# Patient Record
Sex: MALE | Race: WHITE | Employment: FULL TIME | URBAN - METROPOLITAN AREA
[De-identification: names, ages, dates, MRNs, and addresses within clinical notes are randomized per-mention and may not be internally consistent; named-entity substitution may affect disease eponyms.]

---

## 2017-03-10 DIAGNOSIS — R73.9 HYPERGLYCEMIA: ICD-10-CM

## 2017-03-10 DIAGNOSIS — Z12.5 SCREENING FOR PROSTATE CANCER: ICD-10-CM

## 2017-03-10 DIAGNOSIS — E78.00 PURE HYPERCHOLESTEROLEMIA: ICD-10-CM

## 2017-03-10 DIAGNOSIS — R73.9 HYPERGLYCEMIA: Primary | ICD-10-CM

## 2017-03-10 DIAGNOSIS — I10 ESSENTIAL HYPERTENSION: ICD-10-CM

## 2017-03-10 LAB
ALT SERPL-CCNC: 21 U/L (ref 10–40)
ANION GAP SERPL CALCULATED.3IONS-SCNC: 13 MMOL/L (ref 3–16)
AST SERPL-CCNC: 17 U/L (ref 15–37)
BUN BLDV-MCNC: 12 MG/DL (ref 7–20)
CALCIUM SERPL-MCNC: 9.6 MG/DL (ref 8.3–10.6)
CHLORIDE BLD-SCNC: 105 MMOL/L (ref 99–110)
CHOLESTEROL, TOTAL: 149 MG/DL (ref 0–199)
CO2: 24 MMOL/L (ref 21–32)
CREAT SERPL-MCNC: 0.8 MG/DL (ref 0.8–1.3)
GFR AFRICAN AMERICAN: >60
GFR NON-AFRICAN AMERICAN: >60
GLUCOSE BLD-MCNC: 121 MG/DL (ref 70–99)
HDLC SERPL-MCNC: 60 MG/DL (ref 40–60)
LDL CHOLESTEROL CALCULATED: 74 MG/DL
POTASSIUM SERPL-SCNC: 4.3 MMOL/L (ref 3.5–5.1)
PROSTATE SPECIFIC ANTIGEN: 0.93 NG/ML (ref 0–4)
SODIUM BLD-SCNC: 142 MMOL/L (ref 136–145)
TRIGL SERPL-MCNC: 73 MG/DL (ref 0–150)
VLDLC SERPL CALC-MCNC: 15 MG/DL

## 2017-03-11 LAB
ESTIMATED AVERAGE GLUCOSE: 119.8 MG/DL
HBA1C MFR BLD: 5.8 %

## 2017-03-17 ENCOUNTER — OFFICE VISIT (OUTPATIENT)
Dept: FAMILY MEDICINE CLINIC | Age: 72
End: 2017-03-17

## 2017-03-17 VITALS
WEIGHT: 237 LBS | SYSTOLIC BLOOD PRESSURE: 122 MMHG | HEIGHT: 75 IN | BODY MASS INDEX: 29.47 KG/M2 | DIASTOLIC BLOOD PRESSURE: 70 MMHG | TEMPERATURE: 97.8 F

## 2017-03-17 DIAGNOSIS — I10 ESSENTIAL HYPERTENSION: ICD-10-CM

## 2017-03-17 DIAGNOSIS — R73.9 HYPERGLYCEMIA: Primary | ICD-10-CM

## 2017-03-17 DIAGNOSIS — E78.00 PURE HYPERCHOLESTEROLEMIA: ICD-10-CM

## 2017-03-17 DIAGNOSIS — Z11.59 NEED FOR HEPATITIS C SCREENING TEST: ICD-10-CM

## 2017-03-17 PROCEDURE — 99215 OFFICE O/P EST HI 40 MIN: CPT | Performed by: FAMILY MEDICINE

## 2017-03-17 RX ORDER — VALSARTAN 160 MG/1
TABLET ORAL
Qty: 90 TABLET | Refills: 3 | Status: SHIPPED | OUTPATIENT
Start: 2017-03-17 | End: 2018-04-19 | Stop reason: SDUPTHER

## 2017-03-17 RX ORDER — ATORVASTATIN CALCIUM 20 MG/1
TABLET, FILM COATED ORAL
Qty: 90 TABLET | Refills: 3 | Status: SHIPPED | OUTPATIENT
Start: 2017-03-17 | End: 2018-04-19 | Stop reason: SDUPTHER

## 2017-03-17 RX ORDER — EPINEPHRINE 0.3 MG/.3ML
INJECTION SUBCUTANEOUS
Qty: 2 EACH | Refills: 0 | Status: SHIPPED | OUTPATIENT
Start: 2017-03-17 | End: 2019-01-07 | Stop reason: ALTCHOICE

## 2017-03-17 RX ORDER — EPINEPHRINE 0.3 MG/.3ML
INJECTION SUBCUTANEOUS
Qty: 2 EACH | Refills: 0 | Status: SHIPPED | OUTPATIENT
Start: 2017-03-17 | End: 2018-04-19 | Stop reason: SDUPTHER

## 2017-03-17 ASSESSMENT — PATIENT HEALTH QUESTIONNAIRE - PHQ9
1. LITTLE INTEREST OR PLEASURE IN DOING THINGS: 0
SUM OF ALL RESPONSES TO PHQ QUESTIONS 1-9: 0
SUM OF ALL RESPONSES TO PHQ9 QUESTIONS 1 & 2: 0
2. FEELING DOWN, DEPRESSED OR HOPELESS: 0

## 2017-03-27 ASSESSMENT — ENCOUNTER SYMPTOMS
SHORTNESS OF BREATH: 0
BLOOD IN STOOL: 0
ABDOMINAL PAIN: 0
ROS SKIN COMMENTS: NO SKIN WOUNDS.

## 2018-03-09 ENCOUNTER — TELEPHONE (OUTPATIENT)
Dept: FAMILY MEDICINE CLINIC | Age: 73
End: 2018-03-09

## 2018-03-09 DIAGNOSIS — Z00.00 ROUTINE GENERAL MEDICAL EXAMINATION AT A HEALTH CARE FACILITY: Primary | ICD-10-CM

## 2018-03-09 NOTE — TELEPHONE ENCOUNTER
Pt has an appt on 4/19/18 for yearly , pt is needing blood work prior to appt. Pl advise.   [phone]

## 2018-04-13 DIAGNOSIS — Z00.00 ROUTINE GENERAL MEDICAL EXAMINATION AT A HEALTH CARE FACILITY: ICD-10-CM

## 2018-04-13 LAB
ANION GAP SERPL CALCULATED.3IONS-SCNC: 14 MMOL/L (ref 3–16)
BUN BLDV-MCNC: 16 MG/DL (ref 7–20)
CALCIUM SERPL-MCNC: 9.4 MG/DL (ref 8.3–10.6)
CHLORIDE BLD-SCNC: 103 MMOL/L (ref 99–110)
CHOLESTEROL, FASTING: 126 MG/DL (ref 0–199)
CO2: 24 MMOL/L (ref 21–32)
CREAT SERPL-MCNC: 0.7 MG/DL (ref 0.8–1.3)
GFR AFRICAN AMERICAN: >60
GFR NON-AFRICAN AMERICAN: >60
GLUCOSE BLD-MCNC: 109 MG/DL (ref 70–99)
HDLC SERPL-MCNC: 55 MG/DL (ref 40–60)
HEPATITIS C ANTIBODY INTERPRETATION: NORMAL
LDL CHOLESTEROL CALCULATED: 61 MG/DL
POTASSIUM SERPL-SCNC: 4.5 MMOL/L (ref 3.5–5.1)
PROSTATE SPECIFIC ANTIGEN: 0.85 NG/ML (ref 0–4)
SODIUM BLD-SCNC: 141 MMOL/L (ref 136–145)
TRIGLYCERIDE, FASTING: 48 MG/DL (ref 0–150)
VLDLC SERPL CALC-MCNC: 10 MG/DL

## 2018-04-14 LAB
ESTIMATED AVERAGE GLUCOSE: 119.8 MG/DL
HBA1C MFR BLD: 5.8 %

## 2018-04-19 ENCOUNTER — OFFICE VISIT (OUTPATIENT)
Dept: FAMILY MEDICINE CLINIC | Age: 73
End: 2018-04-19

## 2018-04-19 VITALS
HEIGHT: 75 IN | SYSTOLIC BLOOD PRESSURE: 128 MMHG | HEART RATE: 62 BPM | WEIGHT: 235 LBS | BODY MASS INDEX: 29.22 KG/M2 | DIASTOLIC BLOOD PRESSURE: 78 MMHG | TEMPERATURE: 98 F

## 2018-04-19 DIAGNOSIS — E78.00 PURE HYPERCHOLESTEROLEMIA: ICD-10-CM

## 2018-04-19 DIAGNOSIS — I10 HYPERTENSION, UNSPECIFIED TYPE: ICD-10-CM

## 2018-04-19 DIAGNOSIS — Z91.030 BEE STING ALLERGY: ICD-10-CM

## 2018-04-19 DIAGNOSIS — Z12.5 SCREENING FOR PROSTATE CANCER: ICD-10-CM

## 2018-04-19 DIAGNOSIS — Z00.00 ROUTINE GENERAL MEDICAL EXAMINATION AT A HEALTH CARE FACILITY: Primary | ICD-10-CM

## 2018-04-19 PROCEDURE — 99397 PER PM REEVAL EST PAT 65+ YR: CPT | Performed by: FAMILY MEDICINE

## 2018-04-19 RX ORDER — EPINEPHRINE 0.3 MG/.3ML
INJECTION SUBCUTANEOUS
Qty: 2 EACH | Refills: 0 | Status: SHIPPED | OUTPATIENT
Start: 2018-04-19 | End: 2019-01-07 | Stop reason: ALTCHOICE

## 2018-04-19 RX ORDER — VALSARTAN 160 MG/1
TABLET ORAL
Qty: 90 TABLET | Refills: 3 | Status: SHIPPED | OUTPATIENT
Start: 2018-04-19 | End: 2018-07-31 | Stop reason: ALTCHOICE

## 2018-04-19 RX ORDER — ATORVASTATIN CALCIUM 20 MG/1
TABLET, FILM COATED ORAL
Qty: 90 TABLET | Refills: 3 | Status: SHIPPED | OUTPATIENT
Start: 2018-04-19 | End: 2019-04-15 | Stop reason: SDUPTHER

## 2018-04-19 ASSESSMENT — PATIENT HEALTH QUESTIONNAIRE - PHQ9
SUM OF ALL RESPONSES TO PHQ9 QUESTIONS 1 & 2: 0
1. LITTLE INTEREST OR PLEASURE IN DOING THINGS: 0
2. FEELING DOWN, DEPRESSED OR HOPELESS: 0
SUM OF ALL RESPONSES TO PHQ QUESTIONS 1-9: 0

## 2018-04-19 ASSESSMENT — ENCOUNTER SYMPTOMS
BLOOD IN STOOL: 0
DIARRHEA: 0
VOMITING: 0
SHORTNESS OF BREATH: 0
NAUSEA: 0

## 2018-04-25 ENCOUNTER — TELEPHONE (OUTPATIENT)
Dept: FAMILY MEDICINE CLINIC | Age: 73
End: 2018-04-25

## 2018-05-11 ASSESSMENT — ENCOUNTER SYMPTOMS
ABDOMINAL PAIN: 0
CONSTIPATION: 0
TROUBLE SWALLOWING: 0
EYE PAIN: 0
WHEEZING: 0

## 2018-07-30 ENCOUNTER — TELEPHONE (OUTPATIENT)
Dept: FAMILY MEDICINE CLINIC | Age: 73
End: 2018-07-30

## 2018-07-31 RX ORDER — LOSARTAN POTASSIUM 100 MG/1
100 TABLET ORAL DAILY
Qty: 90 TABLET | Refills: 2 | Status: SHIPPED | OUTPATIENT
Start: 2018-07-31 | End: 2019-04-15 | Stop reason: SDUPTHER

## 2019-01-07 ENCOUNTER — OFFICE VISIT (OUTPATIENT)
Dept: FAMILY MEDICINE CLINIC | Age: 74
End: 2019-01-07
Payer: MEDICARE

## 2019-01-07 VITALS
HEIGHT: 75 IN | BODY MASS INDEX: 29.22 KG/M2 | SYSTOLIC BLOOD PRESSURE: 130 MMHG | WEIGHT: 235 LBS | DIASTOLIC BLOOD PRESSURE: 80 MMHG | TEMPERATURE: 97.7 F

## 2019-01-07 DIAGNOSIS — H61.92 SKIN LESION OF LEFT EAR: Primary | ICD-10-CM

## 2019-01-07 PROCEDURE — 99213 OFFICE O/P EST LOW 20 MIN: CPT | Performed by: FAMILY MEDICINE

## 2019-02-25 ASSESSMENT — ENCOUNTER SYMPTOMS: SHORTNESS OF BREATH: 0

## 2019-04-09 DIAGNOSIS — Z12.5 SCREENING FOR PROSTATE CANCER: ICD-10-CM

## 2019-04-09 DIAGNOSIS — Z00.00 ROUTINE GENERAL MEDICAL EXAMINATION AT A HEALTH CARE FACILITY: ICD-10-CM

## 2019-04-09 DIAGNOSIS — E78.00 PURE HYPERCHOLESTEROLEMIA: ICD-10-CM

## 2019-04-09 LAB
ANION GAP SERPL CALCULATED.3IONS-SCNC: 10 MMOL/L (ref 3–16)
BUN BLDV-MCNC: 13 MG/DL (ref 7–20)
CALCIUM SERPL-MCNC: 9.4 MG/DL (ref 8.3–10.6)
CHLORIDE BLD-SCNC: 103 MMOL/L (ref 99–110)
CHOLESTEROL, TOTAL: 126 MG/DL (ref 0–199)
CO2: 24 MMOL/L (ref 21–32)
CREAT SERPL-MCNC: 0.8 MG/DL (ref 0.8–1.3)
ESTIMATED AVERAGE GLUCOSE: 119.8 MG/DL
GFR AFRICAN AMERICAN: >60
GFR NON-AFRICAN AMERICAN: >60
GLUCOSE BLD-MCNC: 118 MG/DL (ref 70–99)
HBA1C MFR BLD: 5.8 %
HDLC SERPL-MCNC: 54 MG/DL (ref 40–60)
LDL CHOLESTEROL CALCULATED: 63 MG/DL
POTASSIUM SERPL-SCNC: 4.5 MMOL/L (ref 3.5–5.1)
PROSTATE SPECIFIC ANTIGEN: 0.97 NG/ML (ref 0–4)
SODIUM BLD-SCNC: 137 MMOL/L (ref 136–145)
TRIGL SERPL-MCNC: 44 MG/DL (ref 0–150)
VLDLC SERPL CALC-MCNC: 9 MG/DL

## 2019-04-15 ENCOUNTER — OFFICE VISIT (OUTPATIENT)
Dept: FAMILY MEDICINE CLINIC | Age: 74
End: 2019-04-15
Payer: MEDICARE

## 2019-04-15 VITALS
SYSTOLIC BLOOD PRESSURE: 130 MMHG | DIASTOLIC BLOOD PRESSURE: 80 MMHG | BODY MASS INDEX: 29.74 KG/M2 | HEIGHT: 75 IN | WEIGHT: 239.2 LBS | TEMPERATURE: 98.5 F

## 2019-04-15 DIAGNOSIS — M79.642 HAND PAIN, LEFT: ICD-10-CM

## 2019-04-15 DIAGNOSIS — I10 HYPERTENSION, UNSPECIFIED TYPE: ICD-10-CM

## 2019-04-15 DIAGNOSIS — Z12.5 SCREENING FOR PROSTATE CANCER: ICD-10-CM

## 2019-04-15 DIAGNOSIS — R73.03 PREDIABETES: ICD-10-CM

## 2019-04-15 DIAGNOSIS — E78.00 PURE HYPERCHOLESTEROLEMIA: Primary | ICD-10-CM

## 2019-04-15 PROCEDURE — 99215 OFFICE O/P EST HI 40 MIN: CPT | Performed by: FAMILY MEDICINE

## 2019-04-15 RX ORDER — ATORVASTATIN CALCIUM 20 MG/1
TABLET, FILM COATED ORAL
Qty: 90 TABLET | Refills: 3 | Status: SHIPPED | OUTPATIENT
Start: 2019-04-15 | End: 2020-04-16 | Stop reason: SDUPTHER

## 2019-04-15 RX ORDER — LOSARTAN POTASSIUM 100 MG/1
100 TABLET ORAL DAILY
Qty: 90 TABLET | Refills: 3 | Status: SHIPPED | OUTPATIENT
Start: 2019-04-15 | End: 2020-04-16 | Stop reason: SDUPTHER

## 2019-04-15 RX ORDER — EPINEPHRINE 0.3 MG/.3ML
INJECTION SUBCUTANEOUS
Qty: 1 EACH | Refills: 0 | Status: SHIPPED | OUTPATIENT
Start: 2019-04-15 | End: 2021-01-18 | Stop reason: SDUPTHER

## 2019-04-15 ASSESSMENT — ENCOUNTER SYMPTOMS
DIARRHEA: 0
NAUSEA: 0
CONSTIPATION: 0
SHORTNESS OF BREATH: 0
TROUBLE SWALLOWING: 0
ABDOMINAL PAIN: 0
BLOOD IN STOOL: 0
WHEEZING: 0
VOMITING: 0
EYE PAIN: 0

## 2019-04-15 ASSESSMENT — PATIENT HEALTH QUESTIONNAIRE - PHQ9
1. LITTLE INTEREST OR PLEASURE IN DOING THINGS: 0
SUM OF ALL RESPONSES TO PHQ QUESTIONS 1-9: 0
2. FEELING DOWN, DEPRESSED OR HOPELESS: 0
SUM OF ALL RESPONSES TO PHQ9 QUESTIONS 1 & 2: 0
SUM OF ALL RESPONSES TO PHQ QUESTIONS 1-9: 0

## 2019-04-15 NOTE — PATIENT INSTRUCTIONS
Patient Education        Recombinant Zoster (Shingles) Vaccine, RZV: What you need to know  Why get vaccinated? Shingles (also called herpes zoster, or just zoster) is a painful skin rash, often with blisters. Shingles is caused by the varicella zoster virus, the same virus that causes chickenpox. After you have chickenpox, the virus stays in your body and can cause shingles later in life. You can't catch shingles from another person. However, a person who has never had chickenpox (or chickenpox vaccine) could get chickenpox from someone with shingles. A shingles rash usually appears on one side of the face or body and heals within 2 to 4 weeks. Its main symptom is pain, which can be severe. Other symptoms can include fever, headache, chills, and upset stomach. Very rarely, a shingles infection can lead to pneumonia, hearing problems, blindness, brain inflammation (encephalitis), or death. For about 1 person in 5, severe pain can continue even long after the rash has cleared up. This long-lasting pain is called post-herpetic neuralgia (PHN). Shingles is far more common in people 48years of age and older than in younger people, and the risk increases with age. It is also more common in people whose immune system is weakened because of a disease such as cancer, or by drugs such as steroids or chemotherapy. At least 1 million people a year in the [facility] get shingles. Shingles vaccine (recombinant)  Recombinant shingles vaccine was approved by FDA in 2017 for the prevention of shingles. In clinical trials, it was more than 90% effective in preventing shingles. It can also reduce the likelihood of PHN. Two doses, 2 to 6 months apart, are recommended for adults 48 and older. This vaccine is also recommended for people who have already gotten the live shingles vaccine (Zostavax). There is no live virus in this vaccine.   Some people should not get this vaccine  Tell your vaccine provider if you:  · Have any severe, life-threatening allergies. A person who has ever had a life-threatening allergic reaction after a dose of recombinant shingles vaccine, or has a severe allergy to any component of this vaccine, may be advised not to be vaccinated. Ask your health care provider if you want information about vaccine components. · Are pregnant or breastfeeding. There is not much information about use of recombinant shingles vaccine in pregnant or nursing women. Your healthcare provider might recommend delaying vaccination. · Are not feeling well. If you have a mild illness, such as a cold, you can probably get the vaccine today. If you are moderately or severely ill, you should probably wait until you recover. Your doctor can advise you. Risks of a vaccine reaction  With any medicine, including vaccines, there is a chance of reactions. After recombinant shingles vaccination, a person might experience:  · Pain, redness, soreness, or swelling at the site of the injection  · Headache, muscle aches, fever, shivering, fatigue  In clinical trials, most people got a sore arm with mild or moderate pain after vaccination, and some also had redness and swelling where they got the shot. Some people felt tired, had muscle pain, a headache, shivering, fever, stomach pain, or nausea. About 1 out of 6 people who got recombinant zoster vaccine experienced side effects that prevented them from doing regular activities. Symptoms went away on their own in about 2 to 3 days. Side effects were more common in younger people. You should still get the second dose of recombinant zoster vaccine even if you had one of these reactions after the first dose. Other things that could happen after this vaccine:  · People sometimes faint after medical procedures, including vaccination. Sitting or lying down for about 15 minutes can help prevent fainting and injuries caused by a fall.  Tell your provider if you feel dizzy or have vision changes or ringing in the ears. · Some people get shoulder pain that can be more severe and longer-lasting than routine soreness that can follow injections. This happens very rarely. · Any medication can cause a severe allergic reaction. Such reactions to a vaccine are estimated at about 1 in a million doses, and would happen within a few minutes to a few hours after the vaccination. As with any medicine, there is a very remote chance of a vaccine causing a serious injury or death. The safety of vaccines is always being monitored. For more information, visit: www.cdc.gov/vaccinesafety/  What if there is a serious problem? What should I look for? · Look for anything that concerns you, such as signs of a severe allergic reaction, very high fever, or unusual behavior. Signs of a severe allergic reaction can include hives, swelling of the face and throat, difficulty breathing, a fast heartbeat, dizziness, and weakness. These would usually start a few minutes to a few hours after the vaccination. What should I do? · If you think it is a severe allergic reaction or other emergency that can't wait, call 9-1-1 or get to the nearest hospital. Otherwise, call your health care provider. · Afterward, the reaction should be reported to the Vaccine Adverse Event Reporting System (VAERS). Your doctor should file this report, or you can do it yourself through the VAERS website at www.vaers. hhs.gov, or by calling 2-393.429.8097. VAERS does not give medical advice. .  How can I learn more? · Ask your health care provider. He or she can give you the vaccine package insert or suggest other sources of information. · Call your local or state health department. · Contact the Centers for Disease Control and Prevention (CDC):  ? Call 1-116.779.2380 (1-800-CDC-INFO) or  ?  Visit CDC's website at www.cdc.gov/vaccines  Vaccine Information Statement (Interim)  Recombinant Zoster Vaccine  2/12/2018  Department of Health and Human Services  Centers for Disease Control and Prevention  Many Vaccine Information Statements are available in [place] and other languages. See www.immunize.org/vis. Hojas de Información Sobre Vacunas están disponibles en Español y en muchos otros idiomas. Visite Favian.si   Care instructions adapted under license by [facility] ([facility]). If you have questions about a medical condition or this instruction, always ask your healthcare professional. [place] [address] any warranty or liability for your use of this information.

## 2019-10-05 ENCOUNTER — APPOINTMENT (OUTPATIENT)
Dept: GENERAL RADIOLOGY | Age: 74
End: 2019-10-05
Payer: MEDICARE

## 2019-10-05 ENCOUNTER — HOSPITAL ENCOUNTER (EMERGENCY)
Age: 74
Discharge: HOME | End: 2019-10-05
Attending: EMERGENCY MEDICINE
Payer: MEDICARE

## 2019-10-05 VITALS
RESPIRATION RATE: 16 BRPM | DIASTOLIC BLOOD PRESSURE: 91 MMHG | SYSTOLIC BLOOD PRESSURE: 158 MMHG | TEMPERATURE: 97 F | HEIGHT: 75 IN | OXYGEN SATURATION: 98 % | WEIGHT: 247.14 LBS | BODY MASS INDEX: 30.73 KG/M2 | HEART RATE: 62 BPM

## 2019-10-05 DIAGNOSIS — R21 PAPULAR RASH, LOCALIZED: Primary | ICD-10-CM

## 2019-10-05 DIAGNOSIS — S60.00XA TRAUMATIC ECCHYMOSIS OF FINGER, INITIAL ENCOUNTER: ICD-10-CM

## 2019-10-05 DIAGNOSIS — R20.0 NUMBNESS OF FINGER: ICD-10-CM

## 2019-10-05 PROCEDURE — 73140 X-RAY EXAM OF FINGER(S): CPT

## 2019-10-05 PROCEDURE — 99283 EMERGENCY DEPT VISIT LOW MDM: CPT

## 2020-01-20 ENCOUNTER — TELEPHONE (OUTPATIENT)
Dept: FAMILY MEDICINE CLINIC | Age: 75
End: 2020-01-20

## 2020-01-20 NOTE — TELEPHONE ENCOUNTER
Pt is former Dr Candi Mari pt scheduled with you for physical 4/16/20, he is asking if the lab orders can be updated with your name on them.       Please advise, [phone]

## 2020-01-22 NOTE — TELEPHONE ENCOUNTER
I can but there is no need for a psa unless he has had prostate ca in past. Simply due to his age  He is also very overdue for his office visit  I see my stable htn and lipid patients twice a year

## 2020-04-16 ENCOUNTER — VIRTUAL VISIT (OUTPATIENT)
Dept: FAMILY MEDICINE CLINIC | Age: 75
End: 2020-04-16
Payer: MEDICARE

## 2020-04-16 PROCEDURE — 99214 OFFICE O/P EST MOD 30 MIN: CPT | Performed by: FAMILY MEDICINE

## 2020-04-16 RX ORDER — ATORVASTATIN CALCIUM 20 MG/1
TABLET, FILM COATED ORAL
Qty: 90 TABLET | Refills: 2 | Status: SHIPPED | OUTPATIENT
Start: 2020-04-16 | End: 2020-09-28 | Stop reason: SDUPTHER

## 2020-04-16 RX ORDER — LOSARTAN POTASSIUM 100 MG/1
100 TABLET ORAL DAILY
Qty: 90 TABLET | Refills: 2 | Status: SHIPPED | OUTPATIENT
Start: 2020-04-16 | End: 2020-09-28 | Stop reason: SDUPTHER

## 2020-04-16 ASSESSMENT — PATIENT HEALTH QUESTIONNAIRE - PHQ9
SUM OF ALL RESPONSES TO PHQ QUESTIONS 1-9: 0
1. LITTLE INTEREST OR PLEASURE IN DOING THINGS: 0
SUM OF ALL RESPONSES TO PHQ9 QUESTIONS 1 & 2: 0
2. FEELING DOWN, DEPRESSED OR HOPELESS: 0
SUM OF ALL RESPONSES TO PHQ QUESTIONS 1-9: 0

## 2020-04-16 ASSESSMENT — ENCOUNTER SYMPTOMS
CHEST TIGHTNESS: 0
NAUSEA: 0
EYE PAIN: 0
COUGH: 0
ABDOMINAL DISTENTION: 0
DIARRHEA: 0
ABDOMINAL PAIN: 0
CONSTIPATION: 0
VOMITING: 0
TROUBLE SWALLOWING: 0
SORE THROAT: 0
BLOOD IN STOOL: 0
SHORTNESS OF BREATH: 0

## 2020-04-16 NOTE — PROGRESS NOTES
Subjective:      Patient ID: Michael Dean is a 69 y.o. male. Patient presents with: Annual Exam    Here for the above. New patient to me former patient dr luna. Treated for htn and lipid  Former smoker none for 30 years  Colon 12/30/12. Recheck in 5 years due to hx of polyps  Has seen derm sporadically    Needs aaa screen  He still works doing  Carpentry. Colon done on 4/23/18 small polyp check in 5 years. Dr Sravan Cid still wants drawn and I told him really not needed but he desired to do  [place] Sees for thought good idea he has mi in brother and father so former doctor told him to take  epipen used for wasp stings when he gets multiple he will have reactions   bp ~124-132/65-75 at home  Weight 234# no loss of weight     He still see the derm group for skin ca. See reguarly    Needs refill [facility] 90 day  He still wants to do the prostate screen  Ros negative    Family no change   meds reviewed      YOB: 1945    Date of Visit:  4/16/2020     -- Bee Venom -- Swelling    --  Has epi-pen   -- Penicillins    -- Vicodin (Hydrocodone-Acetaminophen)     Current Outpatient Medications:  losartan (COZAAR) 100 MG tablet, Take 1 tablet by mouth daily, Disp: 90 tablet, Rfl: 3  atorvastatin (LIPITOR) 20 MG tablet, TAKE 1 TABLET BY MOUTH AT BEDTIME, Disp: 90 tablet, Rfl: 3  EPINEPHrine (EPIPEN) 0.3 MG/0.3ML SOAJ injection, Use as directed for allergic reaction, Disp: 1 each, Rfl: 0  UNABLE TO FIND, States takes an otc bowel stimulant , Disp: , Rfl:   aspirin (ADULT ASPIRIN EC LOW STRENGTH) 81 MG EC tablet, Take 81 mg by mouth daily. , Disp: , Rfl:   Multiple Vitamin (MULTIVITAMIN) capsule, Take 1 capsule by mouth daily. , Disp: , Rfl:     No current facility-administered medications for this visit. There were no vitals filed for this visit. There is no height or weight on file to calculate BMI.      Wt Readings from Last 3 Encounters:  10/05/19 : 247 lb 2.2 oz (112.1 kg)  04/15/19 : 239 lb 3.2

## 2020-05-01 ENCOUNTER — HOSPITAL ENCOUNTER (EMERGENCY)
Age: 75
Discharge: HOME | End: 2020-05-01
Attending: EMERGENCY MEDICINE
Payer: MEDICARE

## 2020-05-01 ENCOUNTER — APPOINTMENT (OUTPATIENT)
Dept: CT IMAGING | Age: 75
End: 2020-05-01
Payer: MEDICARE

## 2020-05-01 ENCOUNTER — TELEPHONE (OUTPATIENT)
Dept: FAMILY MEDICINE CLINIC | Age: 75
End: 2020-05-01

## 2020-05-01 VITALS
HEIGHT: 75 IN | SYSTOLIC BLOOD PRESSURE: 158 MMHG | WEIGHT: 244.93 LBS | TEMPERATURE: 98.5 F | DIASTOLIC BLOOD PRESSURE: 91 MMHG | OXYGEN SATURATION: 96 % | RESPIRATION RATE: 16 BRPM | HEART RATE: 61 BPM | BODY MASS INDEX: 30.45 KG/M2

## 2020-05-01 LAB
ANION GAP SERPL CALCULATED.3IONS-SCNC: 13 MMOL/L (ref 3–16)
BASOPHILS ABSOLUTE: 0.1 K/UL (ref 0–0.2)
BASOPHILS RELATIVE PERCENT: 1.2 %
BUN BLDV-MCNC: 12 MG/DL (ref 7–20)
CALCIUM SERPL-MCNC: 9.8 MG/DL (ref 8.3–10.6)
CHLORIDE BLD-SCNC: 106 MMOL/L (ref 99–110)
CO2: 23 MMOL/L (ref 21–32)
CREAT SERPL-MCNC: 0.7 MG/DL (ref 0.8–1.3)
EKG ATRIAL RATE: 68 BPM
EKG DIAGNOSIS: NORMAL
EKG P AXIS: 11 DEGREES
EKG P-R INTERVAL: 142 MS
EKG Q-T INTERVAL: 384 MS
EKG QRS DURATION: 94 MS
EKG QTC CALCULATION (BAZETT): 408 MS
EKG R AXIS: -22 DEGREES
EKG T AXIS: 19 DEGREES
EKG VENTRICULAR RATE: 68 BPM
EOSINOPHILS ABSOLUTE: 0.1 K/UL (ref 0–0.6)
EOSINOPHILS RELATIVE PERCENT: 2.5 %
GFR AFRICAN AMERICAN: >60
GFR NON-AFRICAN AMERICAN: >60
GLUCOSE BLD-MCNC: 103 MG/DL (ref 70–99)
HCT VFR BLD CALC: 50.3 % (ref 40.5–52.5)
HEMOGLOBIN: 16.1 G/DL (ref 13.5–17.5)
LYMPHOCYTES ABSOLUTE: 1.2 K/UL (ref 1–5.1)
LYMPHOCYTES RELATIVE PERCENT: 24.1 %
MCH RBC QN AUTO: 31.6 PG (ref 26–34)
MCHC RBC AUTO-ENTMCNC: 32.1 G/DL (ref 31–36)
MCV RBC AUTO: 98.5 FL (ref 80–100)
MONOCYTES ABSOLUTE: 0.6 K/UL (ref 0–1.3)
MONOCYTES RELATIVE PERCENT: 11.2 %
NEUTROPHILS ABSOLUTE: 3 K/UL (ref 1.7–7.7)
NEUTROPHILS RELATIVE PERCENT: 61 %
PDW BLD-RTO: 13.5 % (ref 12.4–15.4)
PLATELET # BLD: 156 K/UL (ref 135–450)
PMV BLD AUTO: 7.5 FL (ref 5–10.5)
POTASSIUM REFLEX MAGNESIUM: 4.1 MMOL/L (ref 3.5–5.1)
RBC # BLD: 5.1 M/UL (ref 4.2–5.9)
SODIUM BLD-SCNC: 142 MMOL/L (ref 136–145)
TROPONIN: <0.01 NG/ML
WBC # BLD: 5 K/UL (ref 4–11)

## 2020-05-01 PROCEDURE — 93010 ELECTROCARDIOGRAM REPORT: CPT | Performed by: INTERNAL MEDICINE

## 2020-05-01 PROCEDURE — 80048 BASIC METABOLIC PNL TOTAL CA: CPT

## 2020-05-01 PROCEDURE — 36415 COLL VENOUS BLD VENIPUNCTURE: CPT

## 2020-05-01 PROCEDURE — 93005 ELECTROCARDIOGRAM TRACING: CPT | Performed by: EMERGENCY MEDICINE

## 2020-05-01 PROCEDURE — 99284 EMERGENCY DEPT VISIT MOD MDM: CPT

## 2020-05-01 PROCEDURE — 84484 ASSAY OF TROPONIN QUANT: CPT

## 2020-05-01 PROCEDURE — 70450 CT HEAD/BRAIN W/O DYE: CPT

## 2020-05-01 PROCEDURE — 85025 COMPLETE CBC W/AUTO DIFF WBC: CPT

## 2020-05-01 NOTE — ED TRIAGE NOTES
Pt. C/o elevated BP at home 199/109 this am, denies chest pain or shortness of breath. Slight headache.

## 2020-05-01 NOTE — TELEPHONE ENCOUNTER
Pt stated that is bp is high     bp 135/70 normal  199/101 about 20 mins ago     Feel weird, light sweats, slight headache     Pl advise.    [phone]

## 2020-05-01 NOTE — ED PROVIDER NOTES
[address] ENCOUNTER      Pt Name: Johnny Escobedo  MRN: [id number]  [place] 1945  Date of evaluation: 5/1/2020  Provider: Manuel Hsieh, [facility]       Chief Complaint   Patient presents with    Hypertension     pt. took BP at home 199/109, called PMD, told to come to ED, no chest pain, no SOB, does have a slight headache         HISTORY OF PRESENT ILLNESS   (Location/Symptom, Timing/Onset, Context/Setting, Quality, Duration, Modifying Factors, Severity)  Note limiting factors. Johnny Escobedo is a 69 y.o. male who presents to the emergency department with a complaint of elevated blood pressure. Patient reports that he has a history of hypertension. He takes losartan for blood pressure control. He took his medication today at 6 AM as scheduled. He states that he has been advised to check his blood pressure periodically 2-3 times a week by his primary care physician. This morning he did a routine check on his blood pressure and it was mildly elevated at 150/93. However, he states that he had just drank a very large thermos of coffee prior to taking his blood pressure this morning. He became concerned because his blood pressure was elevated and a few minutes later checked it and it was 170/90. He checked it again a few minutes later and it was 199/109. He states \"I freaked out\". He called his primary care physician and was advised by the nurse to come to the emergency department for evaluation. He admits to a slight headache but feels that it is because he became anxious about what was going on. He rates the headache a 2/10 intensity and describes the headache as a general mild throbbing pain. He denies any neck pain stiffness or photophobia. He denies any fever or chills. He denies any cough or cold symptoms. He denies any chest pain heaviness pressure or tightness.   He denies any shortness of breath or dyspnea on exertion. No diaphoresis. He denies any focal weakness or numbness. He denies any vision change or speech change. No facial droop. He denies any difficulty walking or speaking. He denies any back pain or flank pain. No recent illness. No recent trauma or injury. He denies any drug or alcohol use. He denies ingestion of any medications chemicals or substances. He specifically denies ingestion of any other stimulants other than caffeine. HPI    Nursing Notes were reviewed. REVIEW OF SYSTEMS    (2-9 systems for level 4, 10 or more for level 5)       Constitutional: Negative for fever or chills. HENT: Negative for rhinorrhea and sore throat. Eyes: Negative for redness or drainage. Respiratory: Negative for shortness of breath or dyspnea on exertion. Cardiovascular: Negative for chest pain. Gastrointestinal: Negative for abdominal pain. Negative for vomiting or diarrhea. Genitourinary: Negative for flank pain. Negative for dysuria. Negative for hematuria. Neurological: Negative for headache. All systems are reviewed and are negative except for those listed above in the history of present illness and ROS.         PAST MEDICAL HISTORY     Past Medical History:   Diagnosis Date    Abnormal finding 7/1/08    mitch    Abnormal finding     Hemoccult-- 7/20/2009 guiac neg// PSA-- 1/7/2011 .80     Cancer ([address]     basal cell left ear, right cheek    Carpal tunnel syndrome on right     Dermatophytosis of nail     Disturbance of skin sensation     Hypercholesteremia     Hypertension     Impaired fasting glucose     Kidney stone     Routine general medical examination at a health care facility     Special screening for malignant neoplasm of prostate     Weight gain          SURGICAL HISTORY       Past Surgical History:   Procedure Laterality Date    ANKLE SURGERY  2002    right ankle plate     CARPAL TUNNEL RELEASE  1999    right hand    COLONOSCOPY  11/30/07    COLONOSCOPY

## 2020-05-04 ENCOUNTER — OFFICE VISIT (OUTPATIENT)
Dept: FAMILY MEDICINE CLINIC | Age: 75
End: 2020-05-04
Payer: MEDICARE

## 2020-05-04 VITALS
DIASTOLIC BLOOD PRESSURE: 98 MMHG | BODY MASS INDEX: 29.72 KG/M2 | TEMPERATURE: 98.3 F | SYSTOLIC BLOOD PRESSURE: 174 MMHG | WEIGHT: 239 LBS | HEIGHT: 75 IN | HEART RATE: 76 BPM

## 2020-05-04 PROCEDURE — 99213 OFFICE O/P EST LOW 20 MIN: CPT | Performed by: FAMILY MEDICINE

## 2020-05-04 RX ORDER — AMLODIPINE BESYLATE 2.5 MG/1
2.5 TABLET ORAL DAILY
Qty: 30 TABLET | Refills: 3 | Status: SHIPPED | OUTPATIENT
Start: 2020-05-04 | End: 2020-08-20

## 2020-05-04 ASSESSMENT — ENCOUNTER SYMPTOMS
VOMITING: 0
NAUSEA: 0
ABDOMINAL PAIN: 0
SHORTNESS OF BREATH: 0
DIARRHEA: 0
COUGH: 0

## 2020-05-04 NOTE — PROGRESS NOTES
Trying to lose weight   05/04/20 : 239 lb (108.4 kg)  05/01/20 : 244 lb 14.9 oz (111.1 kg)  10/05/19 : 247 lb 2.2 oz (112.1 kg)    BP Readings from Last 3 Encounters:  05/04/20 : (!) 174/98  05/01/20 : (!) 158/91  10/05/19 : (!) 158/91          Review of Systems   Constitutional: Negative for appetite change, chills, fever and unexpected weight change. Respiratory: Negative for cough and shortness of breath. Cardiovascular: Negative for chest pain, palpitations and leg swelling. Gastrointestinal: Negative for abdominal pain, diarrhea, nausea and vomiting. Genitourinary: Negative for difficulty urinating. Neurological: Negative for headaches. Objective:   Physical Exam  Constitutional:       General: He is not in acute distress. Appearance: Normal appearance. He is well-developed. He is not ill-appearing or diaphoretic. HENT:      Head: Normocephalic and atraumatic. Eyes:      General: No scleral icterus. Neck:      Musculoskeletal: Neck supple. Thyroid: No thyroid mass or thyromegaly. Cardiovascular:      Rate and Rhythm: Normal rate and regular rhythm. Heart sounds: Normal heart sounds. No murmur. No friction rub. No gallop. Comments:     Pulmonary:      Effort: Pulmonary effort is normal. No tachypnea, accessory muscle usage or respiratory distress. Breath sounds: Normal breath sounds. No decreased breath sounds, wheezing, rhonchi or rales. Abdominal:      General: Bowel sounds are normal. There is no distension or abdominal bruit. Palpations: Abdomen is soft. There is no hepatomegaly, splenomegaly, mass or pulsatile mass. Tenderness: There is no abdominal tenderness. There is no guarding. Lymphadenopathy:      Cervical: No cervical adenopathy. Upper Body:      Right upper body: No supraclavicular adenopathy. Left upper body: No supraclavicular adenopathy. Skin:     General: Skin is warm and dry. Coloration: Skin is not pale.

## 2020-05-22 ENCOUNTER — TELEPHONE (OUTPATIENT)
Dept: FAMILY MEDICINE CLINIC | Age: 75
End: 2020-05-22

## 2020-06-30 ENCOUNTER — HOSPITAL ENCOUNTER (OUTPATIENT)
Dept: ULTRASOUND IMAGING | Age: 75
Discharge: HOME | End: 2020-06-30
Payer: MEDICARE

## 2020-06-30 PROCEDURE — 76706 US ABDL AORTA SCREEN AAA: CPT

## 2020-07-13 ENCOUNTER — TELEPHONE (OUTPATIENT)
Dept: FAMILY MEDICINE CLINIC | Age: 75
End: 2020-07-13

## 2020-07-13 NOTE — TELEPHONE ENCOUNTER
Pt stated that atorvastatin (LIPITOR) 20 MG tablet   losartan (COZAAR) 100 MG tablet   Was a 90 day supply w/ 2 refills   And   amLODIPine (NORVASC) 2.5 MG tablet   Was a 30 day supply w/ 3 refills. Pt usually gets a 90 day supply w/ 3 refills. Pt is needing to know if he is needing to be seen again before he gets refills? Pl advise .   [phone]

## 2020-07-13 NOTE — TELEPHONE ENCOUNTER
Saida Alvarado advised and verbalized understanding. He states he will stop by office this Thursday or Friday to have bp checked.

## 2020-07-13 NOTE — TELEPHONE ENCOUNTER
We added a new bp med to see how it would do  He needs to have a bp check some time last month but the office was closed  Come in to see medical assistant for a bp check and if ok we can send off the 90 day and see us in 5 months

## 2020-07-21 ENCOUNTER — NURSE ONLY (OUTPATIENT)
Dept: FAMILY MEDICINE CLINIC | Age: 75
End: 2020-07-21

## 2020-07-21 VITALS — SYSTOLIC BLOOD PRESSURE: 138 MMHG | DIASTOLIC BLOOD PRESSURE: 84 MMHG

## 2020-08-20 RX ORDER — AMLODIPINE BESYLATE 2.5 MG/1
2.5 TABLET ORAL DAILY
Qty: 30 TABLET | Refills: 3 | Status: SHIPPED | OUTPATIENT
Start: 2020-08-20 | End: 2020-09-28 | Stop reason: SDUPTHER

## 2020-09-28 ENCOUNTER — OFFICE VISIT (OUTPATIENT)
Dept: FAMILY MEDICINE CLINIC | Age: 75
End: 2020-09-28
Payer: MEDICARE

## 2020-09-28 VITALS
DIASTOLIC BLOOD PRESSURE: 82 MMHG | HEART RATE: 76 BPM | HEIGHT: 75 IN | TEMPERATURE: 97.3 F | BODY MASS INDEX: 29.59 KG/M2 | WEIGHT: 238 LBS | SYSTOLIC BLOOD PRESSURE: 124 MMHG

## 2020-09-28 PROCEDURE — 99213 OFFICE O/P EST LOW 20 MIN: CPT | Performed by: FAMILY MEDICINE

## 2020-09-28 RX ORDER — ATORVASTATIN CALCIUM 20 MG/1
TABLET, FILM COATED ORAL
Qty: 90 TABLET | Refills: 2 | Status: SHIPPED | OUTPATIENT
Start: 2020-09-28 | End: 2021-04-23

## 2020-09-28 RX ORDER — AMLODIPINE BESYLATE 2.5 MG/1
2.5 TABLET ORAL DAILY
Qty: 90 TABLET | Refills: 2 | Status: SHIPPED | OUTPATIENT
Start: 2020-09-28 | End: 2021-03-15

## 2020-09-28 RX ORDER — LOSARTAN POTASSIUM 100 MG/1
100 TABLET ORAL DAILY
Qty: 90 TABLET | Refills: 2 | Status: SHIPPED | OUTPATIENT
Start: 2020-09-28 | End: 2021-04-16

## 2020-09-28 ASSESSMENT — ENCOUNTER SYMPTOMS
NAUSEA: 0
VOMITING: 0
COUGH: 0
DIARRHEA: 0
CHEST TIGHTNESS: 0
ABDOMINAL PAIN: 0
ABDOMINAL DISTENTION: 0
BLOOD IN STOOL: 0
SHORTNESS OF BREATH: 0
CONSTIPATION: 0

## 2020-09-28 NOTE — PROGRESS NOTES
Wt Readings from Last 3 Encounters:  09/28/20 : 238 lb (108 kg)  05/04/20 : 239 lb (108.4 kg)  05/01/20 : 244 lb 14.9 oz (111.1 kg)    BP Readings from Last 3 Encounters:  09/28/20 : 124/82  07/21/20 : 138/84  05/04/20 : (!) 174/98                  Review of Systems   Constitutional: Negative for appetite change, chills, fever and unexpected weight change. Respiratory: Negative for cough, chest tightness and shortness of breath. Cardiovascular: Negative for chest pain, palpitations and leg swelling. Gastrointestinal: Negative for abdominal distention, abdominal pain, blood in stool, constipation, diarrhea, nausea and vomiting. No dysphagia and seldom gets heartburn   Genitourinary: Negative for difficulty urinating, dysuria and hematuria. Neurological: Negative for dizziness, light-headedness and headaches. Objective:   Physical Exam  Constitutional:       General: He is not in acute distress. Appearance: Normal appearance. He is well-developed. He is not ill-appearing or diaphoretic. Eyes:      General: No scleral icterus. Neck:      Musculoskeletal: Neck supple. Thyroid: No thyroid mass or thyromegaly. Cardiovascular:      Rate and Rhythm: Normal rate and regular rhythm. Heart sounds: Normal heart sounds. No murmur. No friction rub. No gallop. Comments:     Pulmonary:      Effort: Pulmonary effort is normal. No tachypnea, accessory muscle usage or respiratory distress. Breath sounds: Normal breath sounds. No decreased breath sounds, wheezing, rhonchi or rales. Abdominal:      General: Bowel sounds are normal. There is no distension or abdominal bruit. Palpations: Abdomen is soft. There is no hepatomegaly, splenomegaly, mass or pulsatile mass. Tenderness: There is no abdominal tenderness. There is no guarding. Lymphadenopathy:      Cervical: No cervical adenopathy. Upper Body:      Right upper body: No supraclavicular adenopathy.       Left upper body: No supraclavicular adenopathy. Skin:     General: Skin is warm and dry. Coloration: Skin is not pale. Nails: There is no clubbing. Neurological:      General: No focal deficit present. Mental Status: He is alert. Psychiatric:         Attention and Perception: Attention normal.         Mood and Affect: Mood normal.         Assessment:        Diagnosis Orders   1. Essential hypertension  Lipid Panel    Comprehensive Metabolic Panel   2. Pure hypercholesterolemia  atorvastatin (LIPITOR) 20 MG tablet    Lipid Panel    Comprehensive Metabolic Panel       He plans on getting flu shot at Rx  He had aaa screen in 6/30/20    Orders Placed This Encounter   Medications    atorvastatin (LIPITOR) 20 MG tablet     Sig: TAKE 1 TABLET BY MOUTH AT BEDTIME     Dispense:  90 tablet     Refill:  2    losartan (COZAAR) 100 MG tablet     Sig: Take 1 tablet by mouth daily     Dispense:  90 tablet     Refill:  2    amLODIPine (NORVASC) 2.5 MG tablet     Sig: Take 1 tablet by mouth daily     Dispense:  90 tablet     Refill:  2    hydrocortisone 2.5 % cream     Sig: Apply topically 2 times daily.  prn     Dispense:  45 g     Refill:  0           Plan:      Continue the diet and the current medicine  See in about 6 months     Immunization History   Administered Date(s) Administered    Influenza 10/31/2011    Influenza Virus Vaccine 08/31/2014, 11/25/2017, 10/20/2018    Influenza Whole 12/30/2013, 11/01/2015    Influenza, High Dose (Fluzone 65 yrs and older) 12/11/2012, 10/18/2016    Pneumococcal Conjugate 13-valent ([id number]) 03/08/2016    Pneumococcal Polysaccharide ([id number]) 05/30/2013    Td, unspecified formulation 03/03/2015    Tdap (Boostrix, Adacel) 07/01/2008    Zoster Live (Zostavax) 11/20/2012    Zoster Recombinant (Shingrix) 10/10/2019, 12/16/2019               Ramona Jarvis MD

## 2020-09-28 NOTE — PATIENT INSTRUCTIONS
Continue the diet and the current medicine  See in about 6 months     Immunization History   Administered Date(s) Administered    Influenza 10/31/2011    Influenza Virus Vaccine 08/31/2014, 11/25/2017, 10/20/2018    Influenza Whole 12/30/2013, 11/01/2015    Influenza, High Dose (Fluzone 65 yrs and older) 12/11/2012, 10/18/2016    Pneumococcal Conjugate 13-valent ([id number]) 03/08/2016    Pneumococcal Polysaccharide ([id number]) 05/30/2013    Td, unspecified formulation 03/03/2015    Tdap (Boostrix, Adacel) 07/01/2008    Zoster Live (Zostavax) 11/20/2012    Zoster Recombinant (Shingrix) 10/10/2019, 12/16/2019

## 2020-10-21 DIAGNOSIS — E78.00 PURE HYPERCHOLESTEROLEMIA: ICD-10-CM

## 2020-10-21 DIAGNOSIS — I10 ESSENTIAL HYPERTENSION: ICD-10-CM

## 2020-10-21 LAB
A/G RATIO: 1.3 (ref 1.1–2.2)
ALBUMIN SERPL-MCNC: 4 G/DL (ref 3.4–5)
ALP BLD-CCNC: 65 U/L (ref 40–129)
ALT SERPL-CCNC: 20 U/L (ref 10–40)
ANION GAP SERPL CALCULATED.3IONS-SCNC: 11 MMOL/L (ref 3–16)
AST SERPL-CCNC: 22 U/L (ref 15–37)
BILIRUB SERPL-MCNC: 1.4 MG/DL (ref 0–1)
BUN BLDV-MCNC: 13 MG/DL (ref 7–20)
CALCIUM SERPL-MCNC: 9.3 MG/DL (ref 8.3–10.6)
CHLORIDE BLD-SCNC: 107 MMOL/L (ref 99–110)
CHOLESTEROL, TOTAL: 122 MG/DL (ref 0–199)
CO2: 22 MMOL/L (ref 21–32)
CREAT SERPL-MCNC: 0.8 MG/DL (ref 0.8–1.3)
GFR AFRICAN AMERICAN: >60
GFR NON-AFRICAN AMERICAN: >60
GLOBULIN: 3.1 G/DL
GLUCOSE BLD-MCNC: 137 MG/DL (ref 70–99)
HDLC SERPL-MCNC: 55 MG/DL (ref 40–60)
LDL CHOLESTEROL CALCULATED: 54 MG/DL
POTASSIUM SERPL-SCNC: 4.4 MMOL/L (ref 3.5–5.1)
SODIUM BLD-SCNC: 140 MMOL/L (ref 136–145)
TOTAL PROTEIN: 7.1 G/DL (ref 6.4–8.2)
TRIGL SERPL-MCNC: 66 MG/DL (ref 0–150)
VLDLC SERPL CALC-MCNC: 13 MG/DL

## 2020-10-26 DIAGNOSIS — Z12.5 PROSTATE CANCER SCREENING: ICD-10-CM

## 2020-10-26 DIAGNOSIS — I10 HYPERTENSION, UNSPECIFIED TYPE: ICD-10-CM

## 2020-10-26 DIAGNOSIS — Z00.00 WELL ADULT EXAM: ICD-10-CM

## 2020-10-26 DIAGNOSIS — R73.09 ELEVATED GLUCOSE: ICD-10-CM

## 2020-10-26 LAB
BILIRUBIN URINE: NEGATIVE
BLOOD, URINE: NEGATIVE
CLARITY: CLEAR
COLOR: YELLOW
EPITHELIAL CELLS, UA: 0 /HPF (ref 0–5)
GLUCOSE URINE: NEGATIVE MG/DL
HYALINE CASTS: 2 /LPF (ref 0–8)
KETONES, URINE: 40 MG/DL
LEUKOCYTE ESTERASE, URINE: NEGATIVE
MICROSCOPIC EXAMINATION: YES
NITRITE, URINE: NEGATIVE
PH UA: 6 (ref 5–8)
PROSTATE SPECIFIC ANTIGEN: 0.87 NG/ML (ref 0–4)
PROTEIN UA: ABNORMAL MG/DL
RBC UA: 1 /HPF (ref 0–4)
SPECIFIC GRAVITY UA: 1.02 (ref 1–1.03)
URINE TYPE: ABNORMAL
UROBILINOGEN, URINE: 0.2 E.U./DL
WBC UA: 1 /HPF (ref 0–5)

## 2020-10-27 LAB
ESTIMATED AVERAGE GLUCOSE: 119.8 MG/DL
HBA1C MFR BLD: 5.8 %

## 2021-01-18 ENCOUNTER — TELEPHONE (OUTPATIENT)
Dept: FAMILY MEDICINE CLINIC | Age: 76
End: 2021-01-18

## 2021-01-18 RX ORDER — EPINEPHRINE 0.3 MG/.3ML
INJECTION SUBCUTANEOUS
Qty: 1 EACH | Refills: 0 | Status: SHIPPED | OUTPATIENT
Start: 2021-01-18

## 2021-01-18 NOTE — TELEPHONE ENCOUNTER
Done  Orders Placed This Encounter   Medications    EPINEPHrine (EPIPEN) 0.3 MG/0.3ML SOAJ injection     Sig: Use as directed for allergic reaction     Dispense:  1 each     Refill:  0

## 2021-01-18 NOTE — TELEPHONE ENCOUNTER
[phone] Martir Desir advised and verbalized understanding. He is asking for a refill on his [facility] sent to [facility].

## 2021-01-18 NOTE — TELEPHONE ENCOUNTER
Patient is scheduled to have his 1st covid vaccine this week, he is allergic to pcn and bee venom. He does have epi pens but they are  and would like new script. He is asking if he will be ok with his allergies to get the vaccine.       Please call, [phone]

## 2021-03-15 RX ORDER — AMLODIPINE BESYLATE 2.5 MG/1
2.5 TABLET ORAL DAILY
Qty: 90 TABLET | Refills: 2 | Status: SHIPPED | OUTPATIENT
Start: 2021-03-15 | End: 2021-09-07

## 2021-03-25 ENCOUNTER — TELEPHONE (OUTPATIENT)
Dept: FAMILY MEDICINE CLINIC | Age: 76
End: 2021-03-25

## 2021-03-25 DIAGNOSIS — I10 ESSENTIAL HYPERTENSION: Primary | ICD-10-CM

## 2021-03-25 DIAGNOSIS — R73.09 ELEVATED GLUCOSE: ICD-10-CM

## 2021-03-25 NOTE — TELEPHONE ENCOUNTER
Patient is scheduled for cpe on 4/19 and he is asking for lab orders.         Please call, [phone]

## 2021-04-13 DIAGNOSIS — R73.09 ELEVATED GLUCOSE: ICD-10-CM

## 2021-04-13 DIAGNOSIS — I10 ESSENTIAL HYPERTENSION: ICD-10-CM

## 2021-04-13 LAB
ANION GAP SERPL CALCULATED.3IONS-SCNC: 11 MMOL/L (ref 3–16)
BUN BLDV-MCNC: 14 MG/DL (ref 7–20)
CALCIUM SERPL-MCNC: 9.5 MG/DL (ref 8.3–10.6)
CHLORIDE BLD-SCNC: 105 MMOL/L (ref 99–110)
CO2: 25 MMOL/L (ref 21–32)
CREAT SERPL-MCNC: 0.8 MG/DL (ref 0.8–1.3)
GFR AFRICAN AMERICAN: >60
GFR NON-AFRICAN AMERICAN: >60
GLUCOSE BLD-MCNC: 103 MG/DL (ref 70–99)
POTASSIUM SERPL-SCNC: 4.4 MMOL/L (ref 3.5–5.1)
SODIUM BLD-SCNC: 141 MMOL/L (ref 136–145)

## 2021-04-14 LAB
ESTIMATED AVERAGE GLUCOSE: 128.4 MG/DL
HBA1C MFR BLD: 6.1 %

## 2021-04-16 RX ORDER — LOSARTAN POTASSIUM 100 MG/1
100 TABLET ORAL DAILY
Qty: 90 TABLET | Refills: 2 | Status: SHIPPED | OUTPATIENT
Start: 2021-04-16 | End: 2021-12-29

## 2021-04-19 ENCOUNTER — OFFICE VISIT (OUTPATIENT)
Dept: FAMILY MEDICINE CLINIC | Age: 76
End: 2021-04-19
Payer: MEDICARE

## 2021-04-19 VITALS
WEIGHT: 236 LBS | HEIGHT: 75 IN | SYSTOLIC BLOOD PRESSURE: 128 MMHG | BODY MASS INDEX: 29.34 KG/M2 | TEMPERATURE: 98 F | DIASTOLIC BLOOD PRESSURE: 76 MMHG | HEART RATE: 64 BPM

## 2021-04-19 DIAGNOSIS — R73.01 IMPAIRED FASTING GLUCOSE: ICD-10-CM

## 2021-04-19 DIAGNOSIS — E78.00 PURE HYPERCHOLESTEROLEMIA: ICD-10-CM

## 2021-04-19 DIAGNOSIS — I10 ESSENTIAL HYPERTENSION: Primary | ICD-10-CM

## 2021-04-19 PROCEDURE — 99214 OFFICE O/P EST MOD 30 MIN: CPT | Performed by: FAMILY MEDICINE

## 2021-04-19 ASSESSMENT — ENCOUNTER SYMPTOMS
BLOOD IN STOOL: 0
NAUSEA: 0
CONSTIPATION: 0
DIARRHEA: 0
COUGH: 0
ABDOMINAL DISTENTION: 0
CHEST TIGHTNESS: 0
ABDOMINAL PAIN: 0
SHORTNESS OF BREATH: 0
VOMITING: 0

## 2021-04-19 ASSESSMENT — PATIENT HEALTH QUESTIONNAIRE - PHQ9
SUM OF ALL RESPONSES TO PHQ QUESTIONS 1-9: 0
SUM OF ALL RESPONSES TO PHQ QUESTIONS 1-9: 0

## 2021-04-19 NOTE — PROGRESS NOTES
Subjective:      Patient ID: Marimar June is a 77 y.o. male. Chief Complaint   Patient presents with    Check-Up     lipids, hypertension - discuss lab results        Patient presents with:  Check-Up: lipids, hypertension - discuss lab results    He is well and really no c/o  Here for the lab review    He got the covid vaccine and all ok     meds the same    He is using his wife's fluorouracil on his toe fungus. He has been to foot dr etc and not able to get rid of same     YOB: 1945    Date of Visit:  4/19/2021     -- Bee Venom -- Swelling    --  Has epi-pen   -- Penicillins -- Hives and Itching   -- Vicodin (Hydrocodone-Acetaminophen) -- Rash    Current Outpatient Medications:  losartan (COZAAR) 100 MG tablet, TAKE 1 TABLET BY MOUTH DAILY, Disp: 90 tablet, Rfl: 2  amLODIPine (NORVASC) 2.5 MG tablet, TAKE 1 TABLET BY MOUTH DAILY, Disp: 90 tablet, Rfl: 2  EPINEPHrine (EPIPEN) 0.3 MG/0.3ML SOAJ injection, Use as directed for allergic reaction, Disp: 1 each, Rfl: 0  atorvastatin (LIPITOR) 20 MG tablet, TAKE 1 TABLET BY MOUTH AT BEDTIME, Disp: 90 tablet, Rfl: 2  hydrocortisone 2.5 % cream, Apply topically 2 times daily. prn, Disp: 45 g, Rfl: 0  aspirin (ADULT ASPIRIN EC LOW STRENGTH) 81 MG EC tablet, Take 81 mg by mouth daily. , Disp: , Rfl:   Multiple Vitamin (MULTIVITAMIN) capsule, Take 1 capsule by mouth daily. , Disp: , Rfl:     No current facility-administered medications for this visit.       -------------------------              04/19/21                    0853        -------------------------   BP:         128/76        Site:   Left Upper Arm    Position:    Sitting       Cuff Size:  Large Adult     Pulse:        64          Temp:   98 °F (36.7 °C)   TempSrc:   Temporal       Weight: 236 lb (107 kg)   Height: 6' 3\" (1.905 m)  -------------------------  Body mass index is 29.5 kg/m².      Wt Readings from Last 3 Encounters:  04/19/21 : 236 lb (107 kg) 09/28/20 : 238 lb (108 kg)  05/04/20 : 239 lb (108.4 kg)    BP Readings from Last 3 Encounters:  04/19/21 : 128/76  09/28/20 : 124/82  07/21/20 : 138/84        Review of Systems   Constitutional: Negative for appetite change, chills, fever and unexpected weight change. Respiratory: Negative for cough, chest tightness and shortness of breath. Cardiovascular: Negative for chest pain, palpitations and leg swelling. Gastrointestinal: Negative for abdominal distention, abdominal pain, blood in stool, constipation, diarrhea, nausea and vomiting. Genitourinary: Negative for difficulty urinating, dysuria and hematuria. Neurological: Negative for dizziness and headaches. Objective:   Physical Exam  Constitutional:       General: He is not in acute distress. Appearance: Normal appearance. He is well-developed. He is not ill-appearing or diaphoretic. Cardiovascular:      Rate and Rhythm: Normal rate and regular rhythm. Heart sounds: Normal heart sounds. No murmur. No friction rub. No gallop. Pulmonary:      Effort: Pulmonary effort is normal. No tachypnea, accessory muscle usage or respiratory distress. Breath sounds: Normal breath sounds. No decreased breath sounds, wheezing, rhonchi or rales. Lymphadenopathy:      Cervical: No cervical adenopathy. Upper Body:      Right upper body: No supraclavicular adenopathy. Left upper body: No supraclavicular adenopathy. Skin:     General: Skin is warm and dry. Coloration: Skin is not pale. Comments: Exam of the feet show fungal nail changes and fortunately no skin irritation or lesions   Neurological:      Mental Status: He is alert. Assessment:        Diagnosis Orders   1. Essential hypertension     2. Pure hypercholesterolemia     3.  Impaired fasting glucose         Reviewed the labs with him today that were just done for this visit   Lab Results   Component Value Date    LABA1C 6.1 04/13/2021     Lab Results Component Value Date    .4 04/13/2021     Lab Results   Component Value Date     04/13/2021    K 4.4 04/13/2021    K 4.1 05/01/2020     04/13/2021    CO2 25 04/13/2021    BUN 14 04/13/2021    CREATININE 0.8 04/13/2021    GLUCOSE 103 04/13/2021    CALCIUM 9.5 04/13/2021      Discussed the medicine he has been using and the harm as it is a antineoplastic medication and not appropriate for toenail fungus and potentially harmful     Review the dental form we received from [facility] and patient has decided to see a different dental group       Plan:      Check with the dermatology people about the hard to 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

## 2021-04-19 NOTE — PATIENT INSTRUCTIONS
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

## 2021-04-23 DIAGNOSIS — E78.00 PURE HYPERCHOLESTEROLEMIA: ICD-10-CM

## 2021-04-23 RX ORDER — ATORVASTATIN CALCIUM 20 MG/1
TABLET, FILM COATED ORAL
Qty: 90 TABLET | Refills: 2 | Status: SHIPPED | OUTPATIENT
Start: 2021-04-23 | End: 2021-12-29

## 2021-07-26 ENCOUNTER — TELEPHONE (OUTPATIENT)
Dept: FAMILY MEDICINE CLINIC | Age: 76
End: 2021-07-26

## 2021-07-26 RX ORDER — PREDNISONE 10 MG/1
TABLET ORAL
Qty: 16 TABLET | Refills: 0 | Status: SHIPPED | OUTPATIENT
Start: 2021-07-26 | End: 2021-10-19 | Stop reason: ALTCHOICE

## 2021-07-26 NOTE — TELEPHONE ENCOUNTER
Ok if not better see us    Orders Placed This Encounter   Medications    predniSONE (DELTASONE) 10 MG tablet     Sig: Prednisone 10 mg. #16. Take 2 po bid for 2 days, then 1 po bid for 3 days,  then 1 po daily for 2 days.  Then stop     Dispense:  16 tablet     Refill:  0

## 2021-07-26 NOTE — TELEPHONE ENCOUNTER
Pt has poison ivy on the     Left side of face   Left side of chest     Would like to know if he could get something called into   [facility]     Pl advise   [phone]

## 2021-09-07 RX ORDER — AMLODIPINE BESYLATE 2.5 MG/1
2.5 TABLET ORAL DAILY
Qty: 90 TABLET | Refills: 2 | Status: SHIPPED | OUTPATIENT
Start: 2021-09-07 | End: 2021-12-13

## 2021-10-07 ENCOUNTER — TELEPHONE (OUTPATIENT)
Dept: FAMILY MEDICINE CLINIC | Age: 76
End: 2021-10-07

## 2021-10-07 DIAGNOSIS — R73.01 IMPAIRED FASTING GLUCOSE: Primary | ICD-10-CM

## 2021-10-07 DIAGNOSIS — E78.00 PURE HYPERCHOLESTEROLEMIA: ICD-10-CM

## 2021-10-07 NOTE — TELEPHONE ENCOUNTER
----- Message from Gale Medina sent at 10/6/2021  4:08 PM EDT -----  Subject: Referral Request    QUESTIONS   Reason for referral request? Pt needing labs to be ordered prior to his   appt with Mica Rachel on 10/19. Has the physician seen you for this condition before? Yes  Select a date? 2021-04-14  Select the Provider the patient wants to be referred to, if known (PCP or   Specialist)? Mica Rachel   Preferred Specialist (if applicable)? Do you already have an appointment scheduled? Yes  Select Scheduled Date? 2021-10-19  Select Scheduled Physician? Mica Rachel   Additional Information for Provider?   ---------------------------------------------------------------------------  --------------  Emy Oh INFO  What is the best way for the office to contact you? OK to leave message on   voicemail  Preferred Call Back Phone Number?  [phone]

## 2021-10-19 ENCOUNTER — OFFICE VISIT (OUTPATIENT)
Dept: FAMILY MEDICINE CLINIC | Age: 76
End: 2021-10-19
Payer: MEDICARE

## 2021-10-19 ENCOUNTER — TELEPHONE (OUTPATIENT)
Dept: FAMILY MEDICINE CLINIC | Age: 76
End: 2021-10-19

## 2021-10-19 VITALS
HEIGHT: 73 IN | HEART RATE: 64 BPM | WEIGHT: 235 LBS | DIASTOLIC BLOOD PRESSURE: 82 MMHG | BODY MASS INDEX: 31.14 KG/M2 | TEMPERATURE: 97.2 F | SYSTOLIC BLOOD PRESSURE: 136 MMHG

## 2021-10-19 DIAGNOSIS — R29.890 LOSS OF HEIGHT: ICD-10-CM

## 2021-10-19 DIAGNOSIS — I10 ESSENTIAL HYPERTENSION: Primary | ICD-10-CM

## 2021-10-19 DIAGNOSIS — R73.01 IMPAIRED FASTING GLUCOSE: ICD-10-CM

## 2021-10-19 DIAGNOSIS — E78.00 PURE HYPERCHOLESTEROLEMIA: ICD-10-CM

## 2021-10-19 PROCEDURE — 99214 OFFICE O/P EST MOD 30 MIN: CPT | Performed by: FAMILY MEDICINE

## 2021-10-19 SDOH — ECONOMIC STABILITY: FOOD INSECURITY: WITHIN THE PAST 12 MONTHS, YOU WORRIED THAT YOUR FOOD WOULD RUN OUT BEFORE YOU GOT MONEY TO BUY MORE.: NEVER TRUE

## 2021-10-19 SDOH — ECONOMIC STABILITY: FOOD INSECURITY: WITHIN THE PAST 12 MONTHS, THE FOOD YOU BOUGHT JUST DIDN'T LAST AND YOU DIDN'T HAVE MONEY TO GET MORE.: NEVER TRUE

## 2021-10-19 ASSESSMENT — ENCOUNTER SYMPTOMS
BLOOD IN STOOL: 0
CHEST TIGHTNESS: 0
CONSTIPATION: 0
NAUSEA: 0
ABDOMINAL DISTENTION: 0
ABDOMINAL PAIN: 0
SHORTNESS OF BREATH: 0
VOMITING: 0
DIARRHEA: 0

## 2021-10-19 ASSESSMENT — SOCIAL DETERMINANTS OF HEALTH (SDOH): HOW HARD IS IT FOR YOU TO PAY FOR THE VERY BASICS LIKE FOOD, HOUSING, MEDICAL CARE, AND HEATING?: NOT HARD AT ALL

## 2021-10-19 NOTE — PROGRESS NOTES
Subjective:      Patient ID: Nancy Hanna is a 77 y.o. male. Chief Complaint   Patient presents with    6 Month Follow-Up     hypertension, lipids - discuss lab results        Patient presents with:  6 Month Follow-Up: hypertension, lipids - discuss lab results    He is well and no c/o  Here for check up and the above  He noted that he is losing some height over time  No back pain but he is uncomfortable in am and stretches and feels fine     Family is well    For a year a superficial nodule on the left palm along the ring finger no trigger no pain his derm said see dr Joceline Hennessy re the same no  Injury  See's the dermatology group    YOB: 1945    Date of Visit:  10/19/2021     -- Bee Venom -- Swelling    --  Has epi-pen   -- Penicillins -- Hives and Itching   -- Vicodin (Hydrocodone-Acetaminophen) -- Rash    Current Outpatient Medications:  amLODIPine (NORVASC) 2.5 MG tablet, TAKE 1 TABLET BY MOUTH DAILY, Disp: 90 tablet, Rfl: 2  atorvastatin (LIPITOR) 20 MG tablet, TAKE 1 TABLET BY MOUTH AT BEDTIME, Disp: 90 tablet, Rfl: 2  losartan (COZAAR) 100 MG tablet, TAKE 1 TABLET BY MOUTH DAILY, Disp: 90 tablet, Rfl: 2  EPINEPHrine (EPIPEN) 0.3 MG/0.3ML SOAJ injection, Use as directed for allergic reaction, Disp: 1 each, Rfl: 0  hydrocortisone 2.5 % cream, Apply topically 2 times daily. prn, Disp: 45 g, Rfl: 0  aspirin (ADULT ASPIRIN EC LOW STRENGTH) 81 MG EC tablet, Take 81 mg by mouth daily. , Disp: , Rfl:   Multiple Vitamin (MULTIVITAMIN) capsule, Take 1 capsule by mouth daily. , Disp: , Rfl:     No current facility-administered medications for this visit.      ---------------------------               10/19/21                      0751         ---------------------------   BP:          136/82         Site:    Left Upper Arm     Position:     Sitting        Cuff Size:   Large Adult      Pulse:         64           Temp:   97.2 °F (36.2 °C)   TempSrc:    Temporal Weight: 235 lb (106.6 kg)   Height: 6' 0.5\" (1.842 m)  ---------------------------  Body mass index is 31.43 kg/m². Wt Readings from Last 3 Encounters:  He has been trying to lose the weight   10/19/21 : 235 lb (106.6 kg)  04/19/21 : 236 lb (107 kg)  09/28/20 : 238 lb (108 kg)    BP Readings from Last 3 Encounters:  10/19/21 : 136/82  04/19/21 : 128/76  09/28/20 : 124/82            Review of Systems   Constitutional: Negative for appetite change, chills, fever and unexpected weight change. Respiratory: Negative for chest tightness and shortness of breath. Cardiovascular: Negative for chest pain, palpitations and leg swelling. Gastrointestinal: Negative for abdominal distention, abdominal pain, blood in stool, constipation, diarrhea, nausea and vomiting. Genitourinary: Negative for difficulty urinating, dysuria and hematuria. Neurological: Negative for dizziness, light-headedness and headaches. Objective:   Physical Exam  Constitutional:       General: He is not in acute distress. Appearance: Normal appearance. He is well-developed. He is not ill-appearing or diaphoretic. HENT:      Head: Normocephalic and atraumatic. Eyes:      General: No scleral icterus. Neck:      Thyroid: No thyroid mass or thyromegaly. Cardiovascular:      Rate and Rhythm: Normal rate and regular rhythm. Heart sounds: Normal heart sounds. No murmur heard. No friction rub. No gallop. Comments:     Pulmonary:      Effort: Pulmonary effort is normal. No tachypnea, accessory muscle usage or respiratory distress. Breath sounds: Normal breath sounds. No decreased breath sounds, wheezing, rhonchi or rales. Abdominal:      General: Bowel sounds are normal. There is no distension or abdominal bruit. Palpations: Abdomen is soft. There is no hepatomegaly, splenomegaly, mass or pulsatile mass. Tenderness: There is no abdominal tenderness. There is no guarding.    Musculoskeletal: Cervical back: Neck supple. Comments: No pain to percuss the L andT spine. He Is slight stooped over about the upper back    Lymphadenopathy:      Cervical: No cervical adenopathy. Upper Body:      Right upper body: No supraclavicular adenopathy. Left upper body: No supraclavicular adenopathy. Skin:     General: Skin is warm and dry. Coloration: Skin is not pale. Nails: There is no clubbing. Neurological:      General: No focal deficit present. Mental Status: He is alert. Assessment:        Diagnosis Orders   1. Essential hypertension     2. Pure hypercholesterolemia     3.  Impaired fasting glucose         Reviewed the blood work with him he had done for this visit and all ok lipid hga1c and cmp nml   Stable no change  Do not think the height is a concern     Lab Results   Component Value Date    LABA1C 5.8 10/12/2021     Lab Results   Component Value Date    .8 10/12/2021     Discussed non coverage of the dexa and he wishes to pursue      Plan:      Continue the diet and the current medicines  See us in 6 months         Mica Rachel MD

## 2021-10-19 NOTE — TELEPHONE ENCOUNTER
[facility] scheduling is calling about DEXA BONE DENSITY 2 SITES     The diagnosis code is not meeting medical necessity, so they are needing a new order with a new diagnosis.      Call pt when new order is placed so pt can call and schedule

## 2021-10-20 ENCOUNTER — TELEPHONE (OUTPATIENT)
Dept: FAMILY MEDICINE CLINIC | Age: 76
End: 2021-10-20

## 2021-10-20 DIAGNOSIS — R29.890 LOSS OF HEIGHT: Primary | ICD-10-CM

## 2021-10-20 NOTE — TELEPHONE ENCOUNTER
Pt stated that since he is unable to get this dexa scan what is the next step?     Pl advise  [phone] (home)

## 2021-12-13 RX ORDER — AMLODIPINE BESYLATE 2.5 MG/1
2.5 TABLET ORAL DAILY
Qty: 90 TABLET | Refills: 2 | Status: SHIPPED | OUTPATIENT
Start: 2021-12-13 | End: 2022-06-27

## 2021-12-29 DIAGNOSIS — E78.00 PURE HYPERCHOLESTEROLEMIA: ICD-10-CM

## 2021-12-29 RX ORDER — LOSARTAN POTASSIUM 100 MG/1
100 TABLET ORAL DAILY
Qty: 90 TABLET | Refills: 2 | Status: SHIPPED | OUTPATIENT
Start: 2021-12-29 | End: 2022-09-26

## 2021-12-29 RX ORDER — ATORVASTATIN CALCIUM 20 MG/1
TABLET, FILM COATED ORAL
Qty: 90 TABLET | Refills: 2 | Status: SHIPPED | OUTPATIENT
Start: 2021-12-29 | End: 2022-09-26

## 2022-06-21 ENCOUNTER — TELEPHONE (OUTPATIENT)
Dept: FAMILY MEDICINE CLINIC | Age: 77
End: 2022-06-21

## 2022-06-21 NOTE — TELEPHONE ENCOUNTER
----- Message from Igor Latham sent at 6/21/2022  4:48 PM EDT -----  Subject: Message to Provider    QUESTIONS  Information for Provider? pt would like to know if Dr Mary Dotson would like to   see him bi yearly or just yearly. please advise pt  ---------------------------------------------------------------------------  --------------  CALL BACK INFO  What is the best way for the office to contact you? OK to leave message on   voicemail  Preferred Call Back Phone Number? [phone]  ---------------------------------------------------------------------------  --------------  SCRIPT ANSWERS  Relationship to Patient?  Self

## 2022-06-27 RX ORDER — AMLODIPINE BESYLATE 2.5 MG/1
2.5 TABLET ORAL DAILY
Qty: 90 TABLET | Refills: 2 | Status: SHIPPED | OUTPATIENT
Start: 2022-06-27

## 2022-06-29 ENCOUNTER — TELEPHONE (OUTPATIENT)
Dept: FAMILY MEDICINE CLINIC | Age: 77
End: 2022-06-29

## 2022-06-29 DIAGNOSIS — R73.09 ELEVATED GLUCOSE: Primary | ICD-10-CM

## 2022-06-29 NOTE — TELEPHONE ENCOUNTER
----- Message from [place] sent at 6/28/2022  4:51 PM EDT -----  Subject: Referral Request    QUESTIONS   Reason for referral request? Routine 6 month bloodwork   Has the physician seen you for this condition before? No   Preferred Specialist (if applicable)? Do you already have an appointment scheduled? No  Additional Information for Provider? Pt has an appointment scheduled for   7/12/22 and was not sure if labs was needed. Pt would like labs done   before appointment if needed. ---------------------------------------------------------------------------  --------------  Stephanie SCHMITT  What is the best way for the office to contact you? OK to leave message on   voicemail  Preferred Call Back Phone Number? [phone]  ---------------------------------------------------------------------------  --------------  SCRIPT ANSWERS  Relationship to Patient?  Self

## 2022-06-30 NOTE — TELEPHONE ENCOUNTER
Left msg for Laura Brasher stating that lab orders are in Epic and to be fasting at least 10-12 hours.

## 2022-07-06 DIAGNOSIS — R73.09 ELEVATED GLUCOSE: ICD-10-CM

## 2022-07-06 LAB — GLUCOSE FASTING: 100 MG/DL (ref 70–99)

## 2022-07-07 ENCOUNTER — APPOINTMENT (OUTPATIENT)
Dept: GENERAL RADIOLOGY | Age: 77
End: 2022-07-07
Payer: MEDICARE

## 2022-07-07 ENCOUNTER — HOSPITAL ENCOUNTER (OUTPATIENT)
Age: 77
Setting detail: OBSERVATION
Discharge: HOME | End: 2022-07-09
Attending: EMERGENCY MEDICINE
Payer: MEDICARE

## 2022-07-07 ENCOUNTER — APPOINTMENT (OUTPATIENT)
Dept: CT IMAGING | Age: 77
End: 2022-07-07
Payer: MEDICARE

## 2022-07-07 DIAGNOSIS — R10.10 UPPER ABDOMINAL PAIN: ICD-10-CM

## 2022-07-07 DIAGNOSIS — R07.89 CHEST DISCOMFORT: Primary | ICD-10-CM

## 2022-07-07 PROBLEM — R07.9 CHEST PAIN, RULE OUT ACUTE MYOCARDIAL INFARCTION: Status: ACTIVE | Noted: 2022-07-07

## 2022-07-07 LAB
A/G RATIO: 1.4 (ref 1.1–2.2)
ALBUMIN SERPL-MCNC: 4.3 G/DL (ref 3.4–5)
ALP BLD-CCNC: 68 U/L (ref 40–129)
ALT SERPL-CCNC: 20 U/L (ref 10–40)
ANION GAP SERPL CALCULATED.3IONS-SCNC: 13 MMOL/L (ref 3–16)
AST SERPL-CCNC: 19 U/L (ref 15–37)
BACTERIA: ABNORMAL /HPF
BASOPHILS ABSOLUTE: 0 K/UL (ref 0–0.2)
BASOPHILS RELATIVE PERCENT: 0.6 %
BILIRUB SERPL-MCNC: 1.4 MG/DL (ref 0–1)
BILIRUBIN URINE: NEGATIVE
BLOOD, URINE: ABNORMAL
BUN BLDV-MCNC: 12 MG/DL (ref 7–20)
CALCIUM SERPL-MCNC: 9.8 MG/DL (ref 8.3–10.6)
CHLORIDE BLD-SCNC: 106 MMOL/L (ref 99–110)
CLARITY: CLEAR
CO2: 21 MMOL/L (ref 21–32)
COLOR: YELLOW
CREAT SERPL-MCNC: 0.7 MG/DL (ref 0.8–1.3)
EKG ATRIAL RATE: 70 BPM
EKG DIAGNOSIS: NORMAL
EKG P AXIS: 36 DEGREES
EKG P-R INTERVAL: 180 MS
EKG Q-T INTERVAL: 390 MS
EKG QRS DURATION: 108 MS
EKG QTC CALCULATION (BAZETT): 421 MS
EKG R AXIS: -30 DEGREES
EKG T AXIS: 27 DEGREES
EKG VENTRICULAR RATE: 70 BPM
EOSINOPHILS ABSOLUTE: 0.1 K/UL (ref 0–0.6)
EOSINOPHILS RELATIVE PERCENT: 1.9 %
ESTIMATED AVERAGE GLUCOSE: 122.6 MG/DL
GFR AFRICAN AMERICAN: >60
GFR NON-AFRICAN AMERICAN: >60
GLUCOSE BLD-MCNC: 96 MG/DL (ref 70–99)
GLUCOSE URINE: NEGATIVE MG/DL
HBA1C MFR BLD: 5.9 %
HCT VFR BLD CALC: 47.3 % (ref 40.5–52.5)
HEMOGLOBIN: 16.4 G/DL (ref 13.5–17.5)
IMMATURE GRANULOCYTES #: 0 K/UL (ref 0–0.2)
IMMATURE GRANULOCYTES %: 0 %
KETONES, URINE: 80 MG/DL
LEUKOCYTE ESTERASE, URINE: ABNORMAL
LIPASE: 28 U/L (ref 13–60)
LYMPHOCYTES ABSOLUTE: 1.5 K/UL (ref 1–5.1)
LYMPHOCYTES RELATIVE PERCENT: 28.2 %
MCH RBC QN AUTO: 32.7 PG (ref 26–34)
MCHC RBC AUTO-ENTMCNC: 34.7 G/DL (ref 32–36.4)
MCV RBC AUTO: 94.2 FL (ref 80–100)
MICROSCOPIC EXAMINATION: YES
MONOCYTES ABSOLUTE: 0.5 K/UL (ref 0–1.3)
MONOCYTES RELATIVE PERCENT: 9 %
NEUTROPHILS ABSOLUTE: 3.2 K/UL (ref 1.7–7.7)
NEUTROPHILS RELATIVE PERCENT: 60.3 %
NITRITE, URINE: NEGATIVE
PDW BLD-RTO: 12.6 % (ref 12.4–15.4)
PH UA: 7 (ref 5–8)
PLATELET # BLD: 138 K/UL (ref 135–450)
PMV BLD AUTO: 9.3 FL (ref 5–10.5)
POTASSIUM REFLEX MAGNESIUM: 4 MMOL/L (ref 3.5–5.1)
PRO-BNP: 186 PG/ML (ref 0–449)
PROTEIN UA: ABNORMAL MG/DL
RBC # BLD: 5.02 M/UL (ref 4.2–5.9)
RBC UA: ABNORMAL /HPF (ref 0–4)
SODIUM BLD-SCNC: 140 MMOL/L (ref 136–145)
SPECIFIC GRAVITY UA: 1.02 (ref 1–1.03)
TOTAL PROTEIN: 7.4 G/DL (ref 6.4–8.2)
TROPONIN: <0.01 NG/ML
URINE REFLEX TO CULTURE: ABNORMAL
URINE TYPE: ABNORMAL
UROBILINOGEN, URINE: 0.2 E.U./DL
WBC # BLD: 5.2 K/UL (ref 4–11)
WBC UA: ABNORMAL /HPF (ref 0–5)

## 2022-07-07 PROCEDURE — 6370000000 HC RX 637 (ALT 250 FOR IP): Performed by: INTERNAL MEDICINE

## 2022-07-07 PROCEDURE — 81001 URINALYSIS AUTO W/SCOPE: CPT

## 2022-07-07 PROCEDURE — C9113 INJ PANTOPRAZOLE SODIUM, VIA: HCPCS | Performed by: EMERGENCY MEDICINE

## 2022-07-07 PROCEDURE — 83690 ASSAY OF LIPASE: CPT

## 2022-07-07 PROCEDURE — 2580000003 HC RX 258: Performed by: INTERNAL MEDICINE

## 2022-07-07 PROCEDURE — G0378 HOSPITAL OBSERVATION PER HR: HCPCS

## 2022-07-07 PROCEDURE — 36415 COLL VENOUS BLD VENIPUNCTURE: CPT

## 2022-07-07 PROCEDURE — 6360000002 HC RX W HCPCS: Performed by: EMERGENCY MEDICINE

## 2022-07-07 PROCEDURE — 6360000004 HC RX CONTRAST MEDICATION: Performed by: EMERGENCY MEDICINE

## 2022-07-07 PROCEDURE — 74177 CT ABD & PELVIS W/CONTRAST: CPT

## 2022-07-07 PROCEDURE — 96372 THER/PROPH/DIAG INJ SC/IM: CPT

## 2022-07-07 PROCEDURE — 6370000000 HC RX 637 (ALT 250 FOR IP): Performed by: PEDIATRICS

## 2022-07-07 PROCEDURE — 80053 COMPREHEN METABOLIC PANEL: CPT

## 2022-07-07 PROCEDURE — 71045 X-RAY EXAM CHEST 1 VIEW: CPT

## 2022-07-07 PROCEDURE — 6360000002 HC RX W HCPCS: Performed by: INTERNAL MEDICINE

## 2022-07-07 PROCEDURE — 84484 ASSAY OF TROPONIN QUANT: CPT

## 2022-07-07 PROCEDURE — 6370000000 HC RX 637 (ALT 250 FOR IP): Performed by: EMERGENCY MEDICINE

## 2022-07-07 PROCEDURE — 83880 ASSAY OF NATRIURETIC PEPTIDE: CPT

## 2022-07-07 PROCEDURE — 96374 THER/PROPH/DIAG INJ IV PUSH: CPT

## 2022-07-07 PROCEDURE — 99285 EMERGENCY DEPT VISIT HI MDM: CPT

## 2022-07-07 PROCEDURE — 93010 ELECTROCARDIOGRAM REPORT: CPT | Performed by: INTERNAL MEDICINE

## 2022-07-07 PROCEDURE — 85025 COMPLETE CBC W/AUTO DIFF WBC: CPT

## 2022-07-07 PROCEDURE — 93005 ELECTROCARDIOGRAM TRACING: CPT | Performed by: EMERGENCY MEDICINE

## 2022-07-07 RX ORDER — ALBUTEROL SULFATE 90 UG/1
2 AEROSOL, METERED RESPIRATORY (INHALATION) PRN
Status: CANCELLED | OUTPATIENT
Start: 2022-07-07 | End: 2022-07-08

## 2022-07-07 RX ORDER — ACETAMINOPHEN 325 MG/1
650 TABLET ORAL EVERY 6 HOURS PRN
Status: DISCONTINUED | OUTPATIENT
Start: 2022-07-07 | End: 2022-07-07

## 2022-07-07 RX ORDER — ASPIRIN 81 MG/1
324 TABLET, CHEWABLE ORAL ONCE
Status: DISCONTINUED | OUTPATIENT
Start: 2022-07-07 | End: 2022-07-07 | Stop reason: SDUPTHER

## 2022-07-07 RX ORDER — NITROGLYCERIN 0.4 MG/1
0.4 TABLET SUBLINGUAL EVERY 5 MIN PRN
Status: CANCELLED | OUTPATIENT
Start: 2022-07-07 | End: 2022-07-08

## 2022-07-07 RX ORDER — ATORVASTATIN CALCIUM 20 MG/1
20 TABLET, FILM COATED ORAL NIGHTLY
Status: DISCONTINUED | OUTPATIENT
Start: 2022-07-07 | End: 2022-07-09 | Stop reason: HOSPADM

## 2022-07-07 RX ORDER — ONDANSETRON 2 MG/ML
4 INJECTION INTRAMUSCULAR; INTRAVENOUS EVERY 6 HOURS PRN
Status: DISCONTINUED | OUTPATIENT
Start: 2022-07-07 | End: 2022-07-09 | Stop reason: HOSPADM

## 2022-07-07 RX ORDER — NITROGLYCERIN 0.4 MG/1
0.4 TABLET SUBLINGUAL EVERY 5 MIN PRN
Status: DISCONTINUED | OUTPATIENT
Start: 2022-07-07 | End: 2022-07-07

## 2022-07-07 RX ORDER — ENOXAPARIN SODIUM 100 MG/ML
30 INJECTION SUBCUTANEOUS 2 TIMES DAILY
Status: DISCONTINUED | OUTPATIENT
Start: 2022-07-07 | End: 2022-07-09 | Stop reason: HOSPADM

## 2022-07-07 RX ORDER — ACETAMINOPHEN 325 MG/1
650 TABLET ORAL EVERY 4 HOURS PRN
Status: DISCONTINUED | OUTPATIENT
Start: 2022-07-07 | End: 2022-07-09 | Stop reason: HOSPADM

## 2022-07-07 RX ORDER — MORPHINE SULFATE 2 MG/ML
2 INJECTION, SOLUTION INTRAMUSCULAR; INTRAVENOUS EVERY 4 HOURS PRN
Status: DISCONTINUED | OUTPATIENT
Start: 2022-07-07 | End: 2022-07-09 | Stop reason: HOSPADM

## 2022-07-07 RX ORDER — SODIUM CHLORIDE 9 MG/ML
INJECTION, SOLUTION INTRAVENOUS PRN
Status: DISCONTINUED | OUTPATIENT
Start: 2022-07-07 | End: 2022-07-09 | Stop reason: HOSPADM

## 2022-07-07 RX ORDER — DOCUSATE SODIUM 100 MG/1
100 CAPSULE, LIQUID FILLED ORAL DAILY
Status: DISCONTINUED | OUTPATIENT
Start: 2022-07-08 | End: 2022-07-09 | Stop reason: HOSPADM

## 2022-07-07 RX ORDER — ONDANSETRON 4 MG/1
4 TABLET, ORALLY DISINTEGRATING ORAL EVERY 8 HOURS PRN
Status: DISCONTINUED | OUTPATIENT
Start: 2022-07-07 | End: 2022-07-09 | Stop reason: HOSPADM

## 2022-07-07 RX ORDER — ATROPINE SULFATE 0.1 MG/ML
0.5 INJECTION INTRAVENOUS EVERY 5 MIN PRN
Status: CANCELLED | OUTPATIENT
Start: 2022-07-07 | End: 2022-07-08

## 2022-07-07 RX ORDER — AMINOPHYLLINE DIHYDRATE 25 MG/ML
50 INJECTION, SOLUTION INTRAVENOUS PRN
Status: CANCELLED | OUTPATIENT
Start: 2022-07-07 | End: 2022-07-08

## 2022-07-07 RX ORDER — ASPIRIN 81 MG/1
324 TABLET, CHEWABLE ORAL ONCE
Status: COMPLETED | OUTPATIENT
Start: 2022-07-07 | End: 2022-07-07

## 2022-07-07 RX ORDER — SODIUM CHLORIDE 0.9 % (FLUSH) 0.9 %
5-40 SYRINGE (ML) INJECTION PRN
Status: DISCONTINUED | OUTPATIENT
Start: 2022-07-07 | End: 2022-07-09 | Stop reason: HOSPADM

## 2022-07-07 RX ORDER — SODIUM CHLORIDE 0.9 % (FLUSH) 0.9 %
5-40 SYRINGE (ML) INJECTION EVERY 12 HOURS SCHEDULED
Status: DISCONTINUED | OUTPATIENT
Start: 2022-07-07 | End: 2022-07-09 | Stop reason: HOSPADM

## 2022-07-07 RX ORDER — ACETAMINOPHEN 650 MG/1
650 SUPPOSITORY RECTAL EVERY 6 HOURS PRN
Status: DISCONTINUED | OUTPATIENT
Start: 2022-07-07 | End: 2022-07-09 | Stop reason: HOSPADM

## 2022-07-07 RX ORDER — SODIUM CHLORIDE 0.9 % (FLUSH) 0.9 %
5-40 SYRINGE (ML) INJECTION PRN
Status: CANCELLED | OUTPATIENT
Start: 2022-07-07 | End: 2022-07-08

## 2022-07-07 RX ORDER — MULTIVITAMIN WITH IRON
1 TABLET ORAL DAILY
Status: DISCONTINUED | OUTPATIENT
Start: 2022-07-08 | End: 2022-07-09 | Stop reason: HOSPADM

## 2022-07-07 RX ORDER — AMLODIPINE BESYLATE 2.5 MG/1
2.5 TABLET ORAL DAILY
Status: DISCONTINUED | OUTPATIENT
Start: 2022-07-08 | End: 2022-07-07

## 2022-07-07 RX ORDER — AMLODIPINE BESYLATE 5 MG/1
2.5 TABLET ORAL DAILY
Status: DISCONTINUED | OUTPATIENT
Start: 2022-07-07 | End: 2022-07-09 | Stop reason: HOSPADM

## 2022-07-07 RX ORDER — POLYETHYLENE GLYCOL 3350 17 G/17G
17 POWDER, FOR SOLUTION ORAL DAILY PRN
Status: DISCONTINUED | OUTPATIENT
Start: 2022-07-07 | End: 2022-07-09 | Stop reason: HOSPADM

## 2022-07-07 RX ORDER — METOPROLOL TARTRATE 5 MG/5ML
5 INJECTION INTRAVENOUS EVERY 5 MIN PRN
Status: CANCELLED | OUTPATIENT
Start: 2022-07-07 | End: 2022-07-08

## 2022-07-07 RX ORDER — ASPIRIN 81 MG/1
81 TABLET ORAL DAILY
Status: DISCONTINUED | OUTPATIENT
Start: 2022-07-07 | End: 2022-07-07

## 2022-07-07 RX ORDER — PANTOPRAZOLE SODIUM 40 MG/10ML
40 INJECTION, POWDER, LYOPHILIZED, FOR SOLUTION INTRAVENOUS ONCE
Status: COMPLETED | OUTPATIENT
Start: 2022-07-07 | End: 2022-07-07

## 2022-07-07 RX ORDER — ASPIRIN 81 MG/1
81 TABLET, CHEWABLE ORAL DAILY
Status: DISCONTINUED | OUTPATIENT
Start: 2022-07-08 | End: 2022-07-09 | Stop reason: HOSPADM

## 2022-07-07 RX ORDER — LOSARTAN POTASSIUM 100 MG/1
100 TABLET ORAL DAILY
Status: DISCONTINUED | OUTPATIENT
Start: 2022-07-08 | End: 2022-07-09 | Stop reason: HOSPADM

## 2022-07-07 RX ORDER — SODIUM CHLORIDE 9 MG/ML
500 INJECTION, SOLUTION INTRAVENOUS CONTINUOUS PRN
Status: CANCELLED | OUTPATIENT
Start: 2022-07-07 | End: 2022-07-08

## 2022-07-07 RX ADMIN — NITROGLYCERIN 1 INCH: 20 OINTMENT TOPICAL at 15:33

## 2022-07-07 RX ADMIN — IOPAMIDOL 100 ML: 755 INJECTION, SOLUTION INTRAVENOUS at 13:57

## 2022-07-07 RX ADMIN — SODIUM CHLORIDE, PRESERVATIVE FREE 10 ML: 5 INJECTION INTRAVENOUS at 22:35

## 2022-07-07 RX ADMIN — ENOXAPARIN SODIUM 30 MG: 100 INJECTION SUBCUTANEOUS at 20:30

## 2022-07-07 RX ADMIN — ATORVASTATIN CALCIUM 20 MG: 20 TABLET, FILM COATED ORAL at 20:31

## 2022-07-07 RX ADMIN — PANTOPRAZOLE SODIUM 40 MG: 40 INJECTION, POWDER, FOR SOLUTION INTRAVENOUS at 15:37

## 2022-07-07 RX ADMIN — AMLODIPINE BESYLATE 2.5 MG: 5 TABLET ORAL at 22:29

## 2022-07-07 RX ADMIN — ALUMINUM HYDROXIDE, MAGNESIUM HYDROXIDE, AND SIMETHICONE: 200; 200; 20 SUSPENSION ORAL at 14:09

## 2022-07-07 RX ADMIN — ACETAMINOPHEN 650 MG: 325 TABLET ORAL at 22:29

## 2022-07-07 RX ADMIN — ASPIRIN 81 MG 324 MG: 81 TABLET ORAL at 15:32

## 2022-07-07 ASSESSMENT — PAIN DESCRIPTION - PAIN TYPE: TYPE: ACUTE PAIN

## 2022-07-07 ASSESSMENT — PAIN DESCRIPTION - DESCRIPTORS
DESCRIPTORS: ACHING
DESCRIPTORS: ACHING

## 2022-07-07 ASSESSMENT — PAIN - FUNCTIONAL ASSESSMENT
PAIN_FUNCTIONAL_ASSESSMENT: 0-10

## 2022-07-07 ASSESSMENT — PAIN SCALES - GENERAL
PAINLEVEL_OUTOF10: 0
PAINLEVEL_OUTOF10: 1
PAINLEVEL_OUTOF10: 0
PAINLEVEL_OUTOF10: 0
PAINLEVEL_OUTOF10: 1
PAINLEVEL_OUTOF10: 2
PAINLEVEL_OUTOF10: 1

## 2022-07-07 ASSESSMENT — PAIN DESCRIPTION - LOCATION
LOCATION: HEAD
LOCATION: ABDOMEN

## 2022-07-07 ASSESSMENT — PAIN DESCRIPTION - ORIENTATION
ORIENTATION: UPPER;MID;RIGHT
ORIENTATION: RIGHT;MID
ORIENTATION: RIGHT;MID

## 2022-07-07 ASSESSMENT — HEART SCORE: ECG: 0

## 2022-07-07 NOTE — ED PROVIDER NOTES
[facility] Emergency Department    Yomi Christensen MD, am the primary clinician of record. CHIEF COMPLAINT  Abdominal Pain (upper abdominal pain since Sunday. Then noticed on way home from work Right side pain. Worried about appendicitis. )       HISTORY OF PRESENT ILLNESS  Lizy Tolbert is a 69 y.o. male  who presents to the ED complaining of about 5 days of vague epigastric discomfort, felt like indigestion to him/gas, typically improved with Rolaids. However the past few hours he has noticed it is radiated to the right upper quadrant in particular. Sometimes the epigastric discomfort does radiate into the chest but in general does not. He has not had any shortness of breath or pleurisy or exertional nature to his symptoms. No leg swelling or history of blood clots. He does have a history of hypercholesterolemia and hypertension which is treated. He had a stress test about 7 or 8 years ago and initially about to me that he would never have another 1 based on a poor experience during it. He has had no fevers or coughing. No known history of CAD. He has had no significant prior abdominal surgeries. Denies any nausea vomiting diarrhea constipation or otherwise abnormal stools. Denies any dysuria or hematuria. No lower abdominal pain at any point. Despite his improvement with Rolaids he denies any food association with his symptoms    No other complaints, modifying factors or associated symptoms. I have reviewed the following from the nursing documentation.     Past Medical History:   Diagnosis Date    Abnormal finding 7/1/08    mitch    Abnormal finding     Hemoccult-- 7/20/2009 guiac neg// PSA-- 1/7/2011 .80     Cancer (HCC)     basal cell left ear, right cheek    Carpal tunnel syndrome on right     Dermatophytosis of nail     Disturbance of skin sensation     Hypercholesteremia     Hypertension     Impaired fasting glucose     Kidney stone     Routine general medical examination at a health care facility     Special screening for malignant neoplasm of prostate     TGA (transient global amnesia)     Weight gain      Past Surgical History:   Procedure Laterality Date    ANKLE SURGERY  2002    right ankle plate     CARPAL TUNNEL RELEASE      right hand    COLONOSCOPY  07    COLONOSCOPY  2018    polyp dr jonas check in 5 years    SKIN CANCER EXCISION      basal cell ear and cheek     Family History   Problem Relation Age of Onset    Cancer Mother         pancreatic cancer     Heart Attack Father     Cancer Sister         throat    Hypertension Brother     Other Brother         cabg     Social History     Socioeconomic History    Marital status:      Spouse name: Not on file    Number of children: Not on file    Years of education: Not on file    Highest education level: Not on file   Occupational History    Not on file   Tobacco Use    Smoking status: Former Smoker     Packs/day: 1.00     Years: 10.00     Pack years: 10.00     Quit date: 1990     Years since quittin.5    Smokeless tobacco: Never Used   Vaping Use    Vaping Use: Never used   Substance and Sexual Activity    Alcohol use: Yes     Comment: 2 shots/day    Drug use: Not Currently     Types: Marijuana Lisa Foote)    Sexual activity: Not Currently   Other Topics Concern    Not on file   Social History Narrative    Not on file     Social Determinants of Health     Financial Resource Strain: Low Risk     Difficulty of Paying Living Expenses: Not hard at all   Food Insecurity: No Food Insecurity    Worried About Running Out of Food in the Last Year: Never true    Jesús of Food in the Last Year: Never true   Transportation Needs:     Lack of Transportation (Medical): Not on file    Lack of Transportation (Non-Medical):  Not on file   Physical Activity:     Days of Exercise per Week: Not on file    Minutes of Exercise per Session: Not on file   Stress:  Feeling of Stress : Not on file   Social Connections:     Frequency of Communication with Friends and Family: Not on file    Frequency of Social Gatherings with Friends and Family: Not on file    Attends Roman Catholic Services: Not on file    Active Member of Clubs or Organizations: Not on file    Attends Club or Organization Meetings: Not on file    Marital Status: Not on file   Intimate Partner Violence:     Fear of Current or Ex-Partner: Not on file    Emotionally Abused: Not on file    Physically Abused: Not on file    Sexually Abused: Not on file   Housing Stability:     Unable to Pay for Housing in the Last Year: Not on file    Number of Places Lived in the Last Year: Not on file    Unstable Housing in the Last Year: Not on file     Current Facility-Administered Medications   Medication Dose Route Frequency Provider Last Rate Last Admin    aspirin chewable tablet 324 mg  324 mg Oral Once Rupal Mejia MD        nitroglycerin (NITRO-BID) 2 % ointment 1 inch  1 inch Topical Once Rupal Mejia MD        pantoprazole (PROTONIX) 40 mg in sodium chloride (PF) 10 mL injection  40 mg IntraVENous Once Rupal Mejia MD         Current Outpatient Medications   Medication Sig Dispense Refill    amLODIPine (NORVASC) 2.5 MG tablet TAKE 1 TABLET BY MOUTH DAILY 90 tablet 2    losartan (COZAAR) 100 MG tablet TAKE 1 TABLET BY MOUTH DAILY 90 tablet 2    atorvastatin (LIPITOR) 20 MG tablet TAKE 1 TABLET BY MOUTH AT BEDTIME 90 tablet 2    EPINEPHrine (EPIPEN) 0.3 MG/0.3ML SOAJ injection Use as directed for allergic reaction 1 each 0    hydrocortisone 2.5 % cream Apply topically 2 times daily. prn 45 g 0    aspirin (ADULT ASPIRIN EC LOW STRENGTH) 81 MG EC tablet Take 81 mg by mouth daily.  Multiple Vitamin (MULTIVITAMIN) capsule Take 1 capsule by mouth daily.        Allergies   Allergen Reactions    Bee Venom Swelling     Has epi-pen    Penicillins Hives and Itching    Vicodin [Hydrocodone-Acetaminophen] Rash       REVIEW OF SYSTEMS  10 systems reviewed, pertinent positives per HPI otherwise noted to be negative. PHYSICAL EXAM  BP (!) 169/85   Pulse 68   Temp 98 °F (36.7 °C) (Tympanic)   Resp 20   Ht 6' 1\" (1.854 m)   Wt 238 lb (108 kg)   SpO2 96%   BMI 31.40 kg/m²    GENERAL APPEARANCE: Awake and alert. Cooperative. No distress. HENT: Normocephalic. Atraumatic. Mucous membranes are dry. NECK: Supple. EYES: PERRL. EOM's grossly intact. HEART/CHEST: RRR. No murmurs. No chest wall tenderness. LUNGS: Respirations unlabored. CTAB. Good air exchange. Speaking comfortably in full sentences. ABDOMEN: Minimal epig/RUQ ttp to deep palpation when upright only, otherwise no abdominal tenderness. Soft. Non-distended. No masses. No organomegaly. No guarding or rebound. Normal bowel sounds throughout. MUSCULOSKELETAL: No extremity edema. Compartments soft. No deformity. No tenderness in the extremities. All extremities neurovascularly intact. SKIN: Warm and dry. No acute rashes. NEUROLOGICAL: Alert and oriented. CN's 2-12 intact. No gross facial drooping. Strength 5/5, sensation intact. 2 plus DTR's in knees bilaterally. Gait normal.  PSYCHIATRIC: Normal mood and affect. LABS  I have reviewed all labs for this visit.    Results for orders placed or performed during the hospital encounter of 07/07/22   CBC with Auto Differential   Result Value Ref Range    WBC 5.2 4.0 - 11.0 K/uL    RBC 5.02 4.20 - 5.90 M/uL    Hemoglobin 16.4 13.5 - 17.5 g/dL    Hematocrit 47.3 40.5 - 52.5 %    MCV 94.2 80.0 - 100.0 fL    MCH 32.7 26.0 - 34.0 pg    MCHC 34.7 32.0 - 36.4 g/dL    RDW 12.6 12.4 - 15.4 %    Platelets 965 939 - 653 K/uL    MPV 9.3 5.0 - 10.5 fL    Neutrophils % 60.3 %    Immature Granulocytes % 0.0 %    Lymphocytes % 28.2 %    Monocytes % 9.0 %    Eosinophils % 1.9 %    Basophils % 0.6 %    Neutrophils Absolute 3.2 1.7 - 7.7 K/uL    Immature Granulocytes # 0.0 0.0 - 0.2 K/uL Lymphocytes Absolute 1.5 1.0 - 5.1 K/uL    Monocytes Absolute 0.5 0.0 - 1.3 K/uL    Eosinophils Absolute 0.1 0.0 - 0.6 K/uL    Basophils Absolute 0.0 0.0 - 0.2 K/uL   Comprehensive Metabolic Panel w/ Reflex to MG   Result Value Ref Range    Sodium 140 136 - 145 mmol/L    Potassium reflex Magnesium 4.0 3.5 - 5.1 mmol/L    Chloride 106 99 - 110 mmol/L    CO2 21 21 - 32 mmol/L    Anion Gap 13 3 - 16    Glucose 96 70 - 99 mg/dL    BUN 12 7 - 20 mg/dL    CREATININE 0.7 (L) 0.8 - 1.3 mg/dL    GFR Non-African American >60 >60    GFR African American >60 >60    Calcium 9.8 8.3 - 10.6 mg/dL    Total Protein 7.4 6.4 - 8.2 g/dL    Albumin 4.3 3.4 - 5.0 g/dL    Albumin/Globulin Ratio 1.4 1.1 - 2.2    Total Bilirubin 1.4 (H) 0.0 - 1.0 mg/dL    Alkaline Phosphatase 68 40 - 129 U/L    ALT 20 10 - 40 U/L    AST 19 15 - 37 U/L   Lipase   Result Value Ref Range    Lipase 28.0 13.0 - 60.0 U/L   Troponin   Result Value Ref Range    Troponin <0.01 <0.01 ng/mL   Brain Natriuretic Peptide   Result Value Ref Range    Pro- 0 - 449 pg/mL   Urinalysis with Reflex to Culture    Specimen: Urine, clean catch   Result Value Ref Range    Color, UA Yellow Straw/Yellow    Clarity, UA Clear Clear    Glucose, Ur Negative Negative mg/dL    Bilirubin Urine Negative Negative    Ketones, Urine 80 (A) Negative mg/dL    Specific Gravity, UA 1.020 1.005 - 1.030    Blood, Urine TRACE-INTACT (A) Negative    pH, UA 7.0 5.0 - 8.0    Protein, UA TRACE (A) Negative mg/dL    Urobilinogen, Urine 0.2 <2.0 E.U./dL    Nitrite, Urine Negative Negative    Leukocyte Esterase, Urine TRACE (A) Negative    Microscopic Examination YES     Urine Type Cleancatch     Urine Reflex to Culture Not Indicated    Microscopic Urinalysis   Result Value Ref Range    WBC, UA 3-5 0 - 5 /HPF    RBC, UA 0-2 0 - 4 /HPF    Bacteria, UA Rare (A) None Seen /HPF   EKG 12 Lead   Result Value Ref Range    Ventricular Rate 70 BPM    Atrial Rate 70 BPM    P-R Interval 180 ms    QRS Duration duodenal sweep are unremarkable. No small bowel distension. No evidence of appendicitis. Colonic diverticulosis with no acute features. Pelvis: No pelvic mass or free pelvic fluid. The prostate is borderline in size. Mild bladder wall thickening, likely accentuated by incomplete distension. Peritoneum/Retroperitoneum: Focal aneurysmal change in the mid infrarenal abdominal aorta and at the aortic bifurcation measuring 2.6 cm maximally. Moderate aortoiliac atherosclerotic plaque. No retroperitoneal adenopathy or upper abdominal ascites. Bones/Soft Tissues: No acute osseous or soft tissue abnormality. Fat containing bilateral inguinal hernias as well as small umbilical hernia. 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 1. No acute findings within the abdomen or pelvis. Colonic diverticulosis with no acute features. 2. Punctate left nephrolithiasis. No evidence of obstructive uropathy. 3. Moderate aortoiliac atherosclerotic plaque. Fusiform aneurysmal changes in the infrarenal abdominal aorta at 2.6 cm maximally. Imaging follow-up recommended every 5 years. RECOMMENDATIONS: 2.6 cm infrarenal abdominal aortic aneurysm. Recommend follow-up every 5 years. Reference: J Am Dwight Radiol 8541;04:121-252. XR CHEST PORTABLE    Result Date: 7/7/2022  EXAMINATION: ONE XRAY VIEW OF THE CHEST 7/7/2022 1:14 pm COMPARISON: 12/07/2012. HISTORY: ORDERING SYSTEM PROVIDED HISTORY: upright XR, abd pain TECHNOLOGIST PROVIDED HISTORY: Reason for exam:->upright XR, abd pain Reason for Exam: upper abd pain FINDINGS: The cardiomediastinal silhouette is unremarkable. The lungs are clear. No infiltrate, pleural fluid or focal process is identified. No acute cardiopulmonary disease. ED COURSE/MDM  Patient seen and evaluated. Old records reviewed. Labs and imaging reviewed and results discussed with patient.       After initial evaluation, differential diagnostic considerations included: kidney stone, pyelonephritis, UTI, appendicitis, bowel obstruction, diverticulitis, hernia, gastritis/gastroenteritis, pancreatitis, cholecystitis, hepatitis, constipation, IBS, IBD    The patient's ED workup was notable for concern for vague epigastric and RUQ pain and chest pain. Not really very reproducible on exam, no improvement with GI cocktail. Labs wholly unremarkable - LFT's and lipase normal.  EKG also unremarkable and trop neg. CTAP without acute findings. Told about incidental borderline AAA likely unrelated, no signs of hepatobiliary or pancreatic or other GI etiology of patient's pain. Given his age, atypical angina must be considered and ruled out. He is hypertensive, no recent cardiac workups. Will try asa, ntg, ppi, and admit for ACS r/o. Pt agreeable to any requested noninvasive stress testing despite his poor experience with this 7+ years ago. Is this patient to be included in the SEP-1 Core Measure? No   Exclusion criteria - the patient is NOT to be included for SEP-1 Core Measure due to: Infection is not suspected    HEART SCORE:    History: 1  EC  Patient Age: 3  *Risk factors for Atherosclerotic disease: Hypertension; Obesity  Risk Factors: 1  Troponin: 0  Heart Score Total: 4      Heart score: 4.   This falls under the following category: Score of 4-6, which indicates low/moderate risk for major adverse cardiac event and supports observation with repeated troponins and/or non-invasive testing      During the patient's ED course, the patient was given:  Medications   aspirin chewable tablet 324 mg (has no administration in time range)   nitroglycerin (NITRO-BID) 2 % ointment 1 inch (has no administration in time range)   pantoprazole (PROTONIX) 40 mg in sodium chloride (PF) 10 mL injection (has no administration in time range)   aluminum & magnesium hydroxide-simethicone (MAALOX) 30 mL, lidocaine viscous hcl (XYLOCAINE) 5 mL (GI COCKTAIL) ( Oral Given 22 8589)   iopamidol (ISOVUE-370) 76 % injection 100 mL (100 mLs IntraVENous Given 7/7/22 4828)        CLINICAL IMPRESSION  1. Chest discomfort    2. Upper abdominal pain        Blood pressure (!) 169/85, pulse 68, temperature 98 °F (36.7 °C), temperature source Tympanic, resp. rate 20, height 6' 1\" (1.854 m), weight 238 lb (108 kg), SpO2 96 %. Donna Crease was admitted in fair condition. The plan is to admit to the hospital at this time under the hospitalist service. Hospitalist accepted the patient and will take over the patient's care. DISCLAIMER: This chart was created using Dragon dictation software. Efforts were made by me to ensure accuracy, however some errors may be present due to limitations of this technology and occasionally words are not transcribed correctly.         Sameer Wallace MD  07/07/22 5742

## 2022-07-07 NOTE — ED NOTES
Report called to Angelina SALINAS at [facility].  Patient will go to room 4114. Patient and wife made aware of room number and .        Ruben Sarabia RN  07/07/22 8485

## 2022-07-07 NOTE — ED NOTES
Report given to strategic. Patient will be transferred to room 4114.      Joey Campbell RN  07/07/22 2815

## 2022-07-08 ENCOUNTER — APPOINTMENT (OUTPATIENT)
Dept: ULTRASOUND IMAGING | Age: 77
End: 2022-07-08
Payer: MEDICARE

## 2022-07-08 LAB
BILIRUB SERPL-MCNC: 1.6 MG/DL (ref 0–1)
BILIRUBIN DIRECT: 0.3 MG/DL (ref 0–0.3)
BILIRUBIN, INDIRECT: 1.3 MG/DL (ref 0–1)
HCT VFR BLD CALC: 46.6 % (ref 40.5–52.5)
HEMOGLOBIN: 15.9 G/DL (ref 13.5–17.5)
LV EF: 77 %
LVEF MODALITY: NORMAL
MCH RBC QN AUTO: 33.1 PG (ref 26–34)
MCHC RBC AUTO-ENTMCNC: 34 G/DL (ref 31–36)
MCV RBC AUTO: 97.3 FL (ref 80–100)
PDW BLD-RTO: 13.5 % (ref 12.4–15.4)
PLATELET # BLD: 140 K/UL (ref 135–450)
PMV BLD AUTO: 7.7 FL (ref 5–10.5)
RBC # BLD: 4.78 M/UL (ref 4.2–5.9)
TROPONIN: <0.01 NG/ML
TROPONIN: <0.01 NG/ML
WBC # BLD: 5.8 K/UL (ref 4–11)

## 2022-07-08 PROCEDURE — 76705 ECHO EXAM OF ABDOMEN: CPT

## 2022-07-08 PROCEDURE — 6370000000 HC RX 637 (ALT 250 FOR IP): Performed by: PEDIATRICS

## 2022-07-08 PROCEDURE — 36415 COLL VENOUS BLD VENIPUNCTURE: CPT

## 2022-07-08 PROCEDURE — A9502 TC99M TETROFOSMIN: HCPCS | Performed by: INTERNAL MEDICINE

## 2022-07-08 PROCEDURE — 6360000002 HC RX W HCPCS: Performed by: PEDIATRICS

## 2022-07-08 PROCEDURE — 6370000000 HC RX 637 (ALT 250 FOR IP): Performed by: INTERNAL MEDICINE

## 2022-07-08 PROCEDURE — 84484 ASSAY OF TROPONIN QUANT: CPT

## 2022-07-08 PROCEDURE — 93017 CV STRESS TEST TRACING ONLY: CPT

## 2022-07-08 PROCEDURE — 85027 COMPLETE CBC AUTOMATED: CPT

## 2022-07-08 PROCEDURE — 3430000000 HC RX DIAGNOSTIC RADIOPHARMACEUTICAL: Performed by: INTERNAL MEDICINE

## 2022-07-08 PROCEDURE — 82248 BILIRUBIN DIRECT: CPT

## 2022-07-08 PROCEDURE — 82247 BILIRUBIN TOTAL: CPT

## 2022-07-08 PROCEDURE — 6360000002 HC RX W HCPCS: Performed by: INTERNAL MEDICINE

## 2022-07-08 PROCEDURE — G0378 HOSPITAL OBSERVATION PER HR: HCPCS

## 2022-07-08 PROCEDURE — 78452 HT MUSCLE IMAGE SPECT MULT: CPT

## 2022-07-08 PROCEDURE — 96372 THER/PROPH/DIAG INJ SC/IM: CPT

## 2022-07-08 PROCEDURE — 2580000003 HC RX 258: Performed by: INTERNAL MEDICINE

## 2022-07-08 PROCEDURE — 94760 N-INVAS EAR/PLS OXIMETRY 1: CPT

## 2022-07-08 PROCEDURE — 6370000000 HC RX 637 (ALT 250 FOR IP): Performed by: PHYSICIAN ASSISTANT

## 2022-07-08 RX ORDER — PANTOPRAZOLE SODIUM 40 MG/1
40 TABLET, DELAYED RELEASE ORAL
Status: DISCONTINUED | OUTPATIENT
Start: 2022-07-08 | End: 2022-07-09 | Stop reason: HOSPADM

## 2022-07-08 RX ORDER — GAUZE BANDAGE 2" X 2"
100 BANDAGE TOPICAL DAILY
Status: DISCONTINUED | OUTPATIENT
Start: 2022-07-08 | End: 2022-07-09 | Stop reason: HOSPADM

## 2022-07-08 RX ORDER — METAXALONE 800 MG/1
800 TABLET ORAL 3 TIMES DAILY PRN
Status: DISCONTINUED | OUTPATIENT
Start: 2022-07-08 | End: 2022-07-09 | Stop reason: HOSPADM

## 2022-07-08 RX ADMIN — LOSARTAN POTASSIUM 100 MG: 100 TABLET, FILM COATED ORAL at 09:57

## 2022-07-08 RX ADMIN — REGADENOSON 0.4 MG: 0.08 INJECTION, SOLUTION INTRAVENOUS at 09:33

## 2022-07-08 RX ADMIN — ACETAMINOPHEN 650 MG: 325 TABLET ORAL at 20:01

## 2022-07-08 RX ADMIN — Medication 100 MG: at 12:07

## 2022-07-08 RX ADMIN — TETROFOSMIN 10 MILLICURIE: 1.38 INJECTION, POWDER, LYOPHILIZED, FOR SOLUTION INTRAVENOUS at 07:00

## 2022-07-08 RX ADMIN — PANTOPRAZOLE SODIUM 40 MG: 40 TABLET, DELAYED RELEASE ORAL at 18:37

## 2022-07-08 RX ADMIN — ASPIRIN 81 MG: 81 TABLET, CHEWABLE ORAL at 12:07

## 2022-07-08 RX ADMIN — ENOXAPARIN SODIUM 30 MG: 100 INJECTION SUBCUTANEOUS at 20:01

## 2022-07-08 RX ADMIN — SODIUM CHLORIDE, PRESERVATIVE FREE 10 ML: 5 INJECTION INTRAVENOUS at 20:05

## 2022-07-08 RX ADMIN — THERA TABS 1 TABLET: TAB at 12:07

## 2022-07-08 RX ADMIN — METAXALONE 800 MG: 800 TABLET ORAL at 13:24

## 2022-07-08 RX ADMIN — SODIUM CHLORIDE, PRESERVATIVE FREE 10 ML: 5 INJECTION INTRAVENOUS at 12:08

## 2022-07-08 RX ADMIN — ATORVASTATIN CALCIUM 20 MG: 20 TABLET, FILM COATED ORAL at 20:01

## 2022-07-08 RX ADMIN — DOCUSATE SODIUM 100 MG: 100 CAPSULE, LIQUID FILLED ORAL at 12:07

## 2022-07-08 RX ADMIN — TETROFOSMIN 30 MILLICURIE: 1.38 INJECTION, POWDER, LYOPHILIZED, FOR SOLUTION INTRAVENOUS at 09:36

## 2022-07-08 RX ADMIN — AMLODIPINE BESYLATE 2.5 MG: 5 TABLET ORAL at 09:57

## 2022-07-08 RX ADMIN — ENOXAPARIN SODIUM 30 MG: 100 INJECTION SUBCUTANEOUS at 12:07

## 2022-07-08 ASSESSMENT — PAIN DESCRIPTION - DESCRIPTORS: DESCRIPTORS: SPASM

## 2022-07-08 ASSESSMENT — PAIN SCALES - GENERAL
PAINLEVEL_OUTOF10: 0
PAINLEVEL_OUTOF10: 0

## 2022-07-08 ASSESSMENT — PAIN DESCRIPTION - LOCATION: LOCATION: BACK

## 2022-07-08 NOTE — CARE COORDINATION
INITIAL CASE MANAGEMENT ASSESSMENT    Reviewed chart, met with patient to assess possible discharge needs. Explained Case Management role/services. Living Situation: Patient lives in [place], [place] with his wife in a house with 2 steps to enter. ADLs: Independent     DME: No DME    PT/OT Recs: None     Active Services: None     Transportation: Active /Wife will transport     Medications: [facility] in [address] barriers    PCP: Ariadne Sanabria MD      HD/PD: N/A    PLAN/COMMENTS: Plan: home with wife. SW/CM provided contact information for patient or family to call with any questions. SW/CM will follow and assist as needed.   Electronically signed by Bri Bustillo RN on 7/8/2022 at 12:00 PM

## 2022-07-08 NOTE — PROGRESS NOTES
Hospitalist Progress Note      PCP: Kip Franco MD    Date of Admission: 7/7/2022        Subjective: feels ok now, some epigastric discomfort, no chest pain. Medications:  Reviewed    Infusion Medications    sodium chloride       Scheduled Medications    multivitamin  1 tablet Oral Daily    losartan  100 mg Oral Daily    atorvastatin  20 mg Oral Nightly    sodium chloride flush  5-40 mL IntraVENous 2 times per day    enoxaparin  30 mg SubCUTAneous BID    aspirin  81 mg Oral Daily    amLODIPine  2.5 mg Oral Daily    docusate sodium  100 mg Oral Daily     PRN Meds: technetium tetrofosmin, hydrocortisone, sodium chloride flush, sodium chloride, ondansetron **OR** ondansetron, [DISCONTINUED] acetaminophen **OR** acetaminophen, polyethylene glycol, morphine, regadenoson, acetaminophen      Intake/Output Summary (Last 24 hours) at 7/8/2022 0825  Last data filed at 7/7/2022 2300  Gross per 24 hour   Intake 240 ml   Output --   Net 240 ml       Physical Exam Performed:    BP (!) 157/90   Pulse 56   Temp 98.2 °F (36.8 °C) (Oral)   Resp 18   Ht 6' 1\" (1.854 m)   Wt 232 lb 2.3 oz (105.3 kg)   SpO2 97%   BMI 30.63 kg/m²     General appearance: No apparent distress  Neck: Supple  Respiratory:  Normal respiratory effort. Clear to auscultation, bilaterally without Rales/Wheezes/Rhonchi. Cardiovascular: Regular rate and rhythm with normal S1/S2 without murmurs, rubs or gallops. Abdomen: Soft, non-tender, non-distended  Musculoskeletal: No clubbing, cyanosis  Skin: Skin color, texture, turgor normal.  No rashes or lesions.   Neurologic:  NO FOCAL WEAKNESS   Psychiatric: Alert and oriented  Capillary Refill: Brisk,3 seconds, normal   Peripheral Pulses: +2 palpable, equal bilaterally       Labs:   Recent Labs     07/07/22  1320   WBC 5.2   HGB 16.4   HCT 47.3        Recent Labs     07/07/22  1320      K 4.0      CO2 21   BUN 12   CREATININE 0.7*   CALCIUM 9.8     Recent Labs 07/07/22  1320   AST 19   ALT 20   BILITOT 1.4*   ALKPHOS 68     No results for input(s): INR in the last 72 hours. Recent Labs     07/07/22  1320 07/08/22  0150   TROPONINI <0.01 <0.01       Urinalysis:      Lab Results   Component Value Date/Time    NITRU Negative 07/07/2022 01:50 PM    WBCUA 3-5 07/07/2022 01:50 PM    BACTERIA Rare 07/07/2022 01:50 PM    RBCUA 0-2 07/07/2022 01:50 PM    BLOODU TRACE-INTACT 07/07/2022 01:50 PM    SPECGRAV 1.020 07/07/2022 01:50 PM    GLUCOSEU Negative 07/07/2022 01:50 PM       Radiology:  CT ABDOMEN PELVIS W IV CONTRAST Additional Contrast? None   Final Result   1. No acute findings within the abdomen or pelvis. Colonic diverticulosis   with no acute features. 2. Punctate left nephrolithiasis. No evidence of obstructive uropathy. 3. Moderate aortoiliac atherosclerotic plaque. Fusiform aneurysmal changes   in the infrarenal abdominal aorta at 2.6 cm maximally. Imaging follow-up   recommended every 5 years. RECOMMENDATIONS:   2.6 cm infrarenal abdominal aortic aneurysm. Recommend follow-up every 5   years. Reference: J Am Dwight Radiol 6452;48:633-262. XR CHEST PORTABLE   Final Result   No acute cardiopulmonary disease. NM Cardiac Stress Test Nuclear Imaging    (Results Pending)           Assessment/Plan:    Active Hospital Problems    Diagnosis     Chest pain, rule out acute myocardial infarction [R07.9]      Priority: Medium     1. Abdominal pain/chest discomfort, patient admitted to the hospital due to monitor, troponin trend or checking, nuclear medicine stress test ordered we will follow-up. 2.  Essential hypertension, p.o. medications, uncontrolled, adding PRN meds. 3.  Alcohol use, will add thiamine consult for decreasing alcohol intake  4. Hyperlipidemia on statin  5. Back pain, chronic, adding muscle relaxant.      Diet: Diet NPO Exceptions are: Ice Chips  Code Status: Full Code    Carrie Chapman MD

## 2022-07-08 NOTE — PROGRESS NOTES
Pt back from stress test. Pt is alert and oriented x4. Pt denies any chest pain, SOB, nausea. Pt sitting up in the chair. Scheduled meds are given as ordered pt denies other needs at present.  Call light and item need in reach Electronically signed by Elis Davis RN on 7/8/2022 at 12:53 PM

## 2022-07-08 NOTE — PLAN OF CARE
Problem: Discharge Planning  Goal: Discharge to home or other facility with appropriate resources  Outcome: Progressing     Problem: Pain  Goal: Verbalizes/displays adequate comfort level or baseline comfort level  Outcome: Progressing  Flowsheets (Taken 7/8/2022 1611)  Verbalizes/displays adequate comfort level or baseline comfort level:   Encourage patient to monitor pain and request assistance   Assess pain using appropriate pain scale  Note: Pt able to express presence and absence of pain using numerical pain scale. Pt pain is managed by PRN analgesics as ordered by MD. Pain reassess after each interventions. Will continue to monitor as needed. Problem: Safety - Adult  Goal: Free from fall injury  Outcome: Progressing  Flowsheets (Taken 7/8/2022 1611)  Free From Fall Injury: Instruct family/caregiver on patient safety  Note: Pt free from falls this shift. non skid socks provided. Pt educated on use of call light as needed for assistance. Call light always within reach. Pt able and agreeable to contact for safety appropriately.

## 2022-07-08 NOTE — PROGRESS NOTES
Pt awake and AAO lying in bed. No c/o voiced. Pt anticipates stress test and then discharge to home tomorrow. Lungs clear. No sob or cough. On RA. On telemetry x24 hrs per orders. Belly round and soft with active BS. LBM this AM. Voids urine without difficulty. No edema noted. L AC HL site and dressing WDL. Pt is NPO x sips and chips. Call light in reach. Will monitor.

## 2022-07-08 NOTE — H&P
Hospital Medicine History & Physical      PCP: Trent Amanda MD    Date of Admission: 7/7/2022    Date of Service: Pt seen/examined on 07/07/22  and Admitted to Inpatient with expected LOS greater than two midnights due to medical therapy. Chief Complaint:  5 days of upper abdominal tightness       History Of Present Illness:      69 y.o. male who presented to [facility] with report of upper abdominal tightness that radiated to the right upper quadrant. He reports in pressing on his abdomen it felt hard. He reports the sensation improved with passing stools. He reports various consistencies to his stools lately. He reports having 2 BMs today and 1 yesterday. No blood in stool. He reports feeling distended recently, but not tonight. He reports presently feeling \"95% fine. \" But his stomach still doesn't feel quite right. This is in the context of nitropaste patch present on his right shoulder. He had a stress test about 8-9 years ago - in  - he reports he struggled with the exercise portion. He reports for exercise he walks briskly - walks about 1/2 mile per day. Previously walked about 2 miles per day, he reports slowing down due to the weather outside - hot/humid. [place] and maintains it. No associated abdominal or chest pressure with activity. He was given a GI cocktail at [facility] ED, but reported no improvement.      ROS:   - no chest pain   - no dyspnea   - no nausea   - no vomiting     SocHx:   - consumes alcohol - 1-2 vodkas / night   - does not smoke (quit smoking in 2000), previously smoked x 25 years before that   -    - has a step daughter and 2 grandchildren   - working - consultant for [employer]       Past Medical History:          Diagnosis Date    Abnormal finding 7/1/08    mitch    Abnormal finding     Hemoccult-- 7/20/2009 guiac neg// PSA-- 1/7/2011 .80     Cancer ([facility] [address]     basal cell left ear, right cheek    Carpal tunnel syndrome on right     Dermatophytosis of nail     Disturbance of skin sensation     Hypercholesteremia     Hypertension     Impaired fasting glucose     Kidney stone     Routine general medical examination at a health care facility     Special screening for malignant neoplasm of prostate     TGA (transient global amnesia)     Weight gain        Past Surgical History:          Procedure Laterality Date    ANKLE SURGERY  2002    right ankle plate     CARPAL TUNNEL RELEASE  1999    right hand    COLONOSCOPY  11/30/07    COLONOSCOPY  04/23/2018    polyp dr jonas check in 5 years    SKIN CANCER EXCISION      basal cell ear and cheek       Medications Prior to Admission:      Prior to Admission medications    Medication Sig Start Date End Date Taking? Authorizing Provider   amLODIPine (NORVASC) 2.5 MG tablet TAKE 1 TABLET BY MOUTH DAILY 6/27/22   Roma So MD   losartan (COZAAR) 100 MG tablet TAKE 1 TABLET BY MOUTH DAILY 12/29/21   Roma So MD   atorvastatin (LIPITOR) 20 MG tablet TAKE 1 TABLET BY MOUTH AT BEDTIME 12/29/21   Roma So MD   EPINEPHrine [facility]) 0.3 MG/0.3ML SOAJ injection Use as directed for allergic reaction 1/18/21   Roma So MD   hydrocortisone 2.5 % cream Apply topically 2 times daily. prn 9/28/20   Roma So MD   aspirin (ADULT ASPIRIN EC LOW STRENGTH) 81 MG EC tablet Take 81 mg by mouth daily. Historical Provider, MD   Multiple Vitamin (MULTIVITAMIN) capsule Take 1 capsule by mouth daily. Historical Provider, MD       Allergies:  Bee venom, Penicillins, and Vicodin [hydrocodone-acetaminophen]    Social History:      The patient currently lives at home     TOBACCO:   reports that he quit smoking about 32 years ago. He has a 10.00 pack-year smoking history. He has never used smokeless tobacco.  ETOH:   reports current alcohol use.   E-cigarette/Vaping     Questions Responses    E-cigarette/Vaping Use Never User    Start Date     Passive Exposure     Quit Date     Counseling Given Date/Time    NITRU Negative 07/07/2022 01:50 PM    WBCUA 3-5 07/07/2022 01:50 PM    BACTERIA Rare 07/07/2022 01:50 PM    RBCUA 0-2 07/07/2022 01:50 PM    BLOODU TRACE-INTACT 07/07/2022 01:50 PM    SPECGRAV 1.020 07/07/2022 01:50 PM    GLUCOSEU Negative 07/07/2022 01:50 PM       Radiology:     CXR: I have reviewed the CXR with the following interpretation:   EKG:  I have reviewed the EKG with the following interpretation:     CT ABDOMEN PELVIS W IV CONTRAST Additional Contrast? None   Preliminary Result   1. No acute findings within the abdomen or pelvis. Colonic diverticulosis   with no acute features. 2. Punctate left nephrolithiasis. No evidence of obstructive uropathy. 3. Moderate aortoiliac atherosclerotic plaque. Fusiform aneurysmal changes   in the infrarenal abdominal aorta at 2.6 cm maximally. Imaging follow-up   recommended every 5 years. RECOMMENDATIONS:   2.6 cm infrarenal abdominal aortic aneurysm. Recommend follow-up every 5   years. Reference: J Am Dwight Radiol 9478;51:041-599. XR CHEST PORTABLE   Final Result   No acute cardiopulmonary disease.          NM Cardiac Stress Test Nuclear Imaging    (Results Pending)       Consults:    IP CONSULT TO HOSPITALIST    ASSESSMENT:    Active Hospital Problems    Diagnosis Date Noted    Chest pain, rule out acute myocardial infarction [R07.9] 07/07/2022     Priority: Denice Guidry is a 69 y.o. male     Abdominal pressure sensation, rule out ACS:   - stress test in AM   - NPO at 0000   - telemetry   - serial TNI   - aspirin 81 mg po daily   - possible constipation - docusate 100 mg po daily     Hypertension:   - amlodipine 5 mg po daily   - losartan 100 mg po daily     Dyslipidemia:   - cont atorvastatin 20 mg po qhs     Supplement:   - cont MV po daily     Reports difficulty with tolerance to exercise:   - changed stress test to lexiscan     Headache:   - with nitro paste on   - acetaminophen q4 hrs prn     DVT Prophylaxis: enoxaparin   Diet: ADULT DIET; Regular  Diet NPO Exceptions are: Ice Chips  Diet NPO Exceptions are: Ice Chips  Code Status: Full Code -   - alternative decision maker- Annamarie Nassar (wife)     PT/OT Eval Status: not consulted     Otilia Chong - pending improvement        Virgilio Dodge MD    Thank you Trini Rivera MD for the opportunity to be involved in this patient's care.

## 2022-07-08 NOTE — PROGRESS NOTES
INPATIENT CONSULTATION:    IDENTIFYING DATA/REASON FOR CONSULTATION   PATIENT:  Tennille Zheng  MRN:  [id number]  ADMIT DATE: 7/7/2022  TIME OF EVALUATION: 7/9/2022 7:26 AM  HOSPITAL STAY:   LOS: 0 days   CONSULTING PHYSICIAN: Jackie Calle MD   REASON FOR CONSULTATION: Abdominal pain    Subjective:    Patient seen and examined in follow-up. Patient reports discomfort is resolved. He tolerated diet. He would like to return home. MEDICATIONS   SCHEDULED:  thiamine mononitrate, 100 mg, Daily  pantoprazole, 40 mg, QAM AC  multivitamin, 1 tablet, Daily  losartan, 100 mg, Daily  atorvastatin, 20 mg, Nightly  sodium chloride flush, 5-40 mL, 2 times per day  enoxaparin, 30 mg, BID  aspirin, 81 mg, Daily  amLODIPine, 2.5 mg, Daily  docusate sodium, 100 mg, Daily      FLUIDS/DRIPS:     sodium chloride       PRNs: metaxalone, 800 mg, TID PRN  hydrocortisone, , BID PRN  sodium chloride flush, 5-40 mL, PRN  sodium chloride, , PRN  ondansetron, 4 mg, Q8H PRN   Or  ondansetron, 4 mg, Q6H PRN  acetaminophen, 650 mg, Q6H PRN  polyethylene glycol, 17 g, Daily PRN  morphine, 2 mg, Q4H PRN  acetaminophen, 650 mg, Q4H PRN      ALLERGIES:    Allergies   Allergen Reactions    Bee Venom Swelling     Has epi-pen    Penicillins Hives and Itching    Vicodin [Hydrocodone-Acetaminophen] Rash         PHYSICAL EXAM     Vitals:    07/08/22 1951 07/09/22 0038 07/09/22 0420 07/09/22 0452   BP:  129/85 (!) 149/91    Pulse:  54 61    Resp:  18 18    Temp:  98.2 °F (36.8 °C) 97.9 °F (36.6 °C)    TempSrc:  Oral Oral    SpO2:  94% 95%    Weight: 229 lb 4.5 oz (104 kg)   229 lb 4.5 oz (104 kg)   Height:           I/O last 3 completed shifts: In: 320 [P.O.:300;  I.V.:20]  Out: -     Physical Exam:  Gen: Resting in bed, NAD  HEENT: Normocephalic, atraumatic, no scleral icterus   CV: RRR no MRG   Pul: CTAB normal work of breathing without wheezing  Abd: Good bowel sounds throughout, no scars, soft, NT/ND, no masses, no HSM   Ext: No edema, moves all 4 extremities. Neuro: Moves all four extremities, no gross deficits, follows commands   Skin: No jaundice, spider angiomas, palmar erythema    LABS AND IMAGING     Recent Results (from the past 24 hour(s))   CBC    Collection Time: 07/08/22  8:47 AM   Result Value Ref Range    WBC 5.8 4.0 - 11.0 K/uL    RBC 4.78 4.20 - 5.90 M/uL    Hemoglobin 15.9 13.5 - 17.5 g/dL    Hematocrit 46.6 40.5 - 52.5 %    MCV 97.3 80.0 - 100.0 fL    MCH 33.1 26.0 - 34.0 pg    MCHC 34.0 31.0 - 36.0 g/dL    RDW 13.5 12.4 - 15.4 %    Platelets 861 895 - 209 K/uL    MPV 7.7 5.0 - 10.5 fL   Troponin    Collection Time: 07/08/22  8:47 AM   Result Value Ref Range    Troponin <0.01 <0.01 ng/mL   Bilirubin Total Direct & Indirect    Collection Time: 07/08/22  8:47 AM   Result Value Ref Range    Total Bilirubin 1.6 (H) 0.0 - 1.0 mg/dL    Bilirubin, Direct 0.3 0.0 - 0.3 mg/dL    Bilirubin, Indirect 1.3 (H) 0.0 - 1.0 mg/dL     Other Labs    Imaging:    US ABDOMEN LIMITED Specify organ? LIVER, GALLBLADDER   Final Result   Unremarkable right upper quadrant ultrasound. NM Cardiac Stress Test Nuclear Imaging   Final Result      CT ABDOMEN PELVIS W IV CONTRAST Additional Contrast? None   Final Result   1. No acute findings within the abdomen or pelvis. Colonic diverticulosis   with no acute features. 2. Punctate left nephrolithiasis. No evidence of obstructive uropathy. 3. Moderate aortoiliac atherosclerotic plaque. Fusiform aneurysmal changes   in the infrarenal abdominal aorta at 2.6 cm maximally. Imaging follow-up   recommended every 5 years. RECOMMENDATIONS:   2.6 cm infrarenal abdominal aortic aneurysm. Recommend follow-up every 5   years. Reference: J Am Dwight Radiol 0992;21:214-603. XR CHEST PORTABLE   Final Result   No acute cardiopulmonary disease.               ASSESSMENT AND RECOMMENDATIONS   Tennille Zheng is a 61-year-old male with past medical history of hypertension, hyperlipidemia and obesity who presented to [facility] 7/7/2022 with epigastric and right upper quadrant tightness.      1. Abdominal pain: Patient was admitted with concern for atypical chest pain and had a cardiac evaluation which was negative including troponins and a stress test.  Differential diagnosis includes NSAID use gastritis, H. pylori gastritis and symptomatic cholelithiasis. LFTs are notable for an abnormal bilirubin, 2/2 Gilbert's syndrome. Will treat with PPI, and patient advised to avoid NSAIDs. We will plan for outpatient EGD. 2. Gilbert's syndrome with elevated indirect bilirubin  3. Colon cancer screening: Patient is due for colon cancer screening. We will arrange for outpatient colonoscopy.     Recommendations:  -PPI  -Okay for discharge from GI standpoint with outpatient follow-up for EGD and colonoscopy    Thank you for allowing me to participate in this patient's care. No further GI work-up needed at this time. We will sign off, however please contact us with any additional questions at [phone]. Nakia Presley.  [address]

## 2022-07-08 NOTE — ACP (ADVANCE CARE PLANNING)
Advance Care Planning     Advance Care Planning Activator (Inpatient)  Conversation Note      Date of ACP Conversation: 7/8/2022     Conversation Conducted with: Patient with Decision Making Capacity    ACP Activator: Leesa Beard RN    Health Care Decision Maker:     Current Designated Health Care Decision Maker:     Primary Decision Maker: Donna Sageralphsunday - [phone]    Care Preferences    Ventilation: \"If you were in your present state of health and suddenly became very ill and were unable to breathe on your own, what would your preference be about the use of a ventilator (breathing machine) if it were available to you? \"      Would the patient desire the use of ventilator (breathing machine)?: yes    \"If your health worsens and it becomes clear that your chance of recovery is unlikely, what would your preference be about the use of a ventilator (breathing machine) if it were available to you? \"     Would the patient desire the use of ventilator (breathing machine)?: Yes      Resuscitation  \"CPR works best to restart the heart when there is a sudden event, like a heart attack, in someone who is otherwise healthy. Unfortunately, CPR does not typically restart the heart for people who have serious health conditions or who are very sick. \"    \"In the event your heart stopped as a result of an underlying serious health condition, would you want attempts to be made to restart your heart (answer \"yes\" for attempt to resuscitate) or would you prefer a natural death (answer \"no\" for do not attempt to resuscitate)? \" yes       [] Yes   [x] No   Educated Patient / [facility] regarding differences between Advance Directives and portable DNR orders.     Length of ACP Conversation in minutes:  3    Conversation Outcomes:  [x] ACP discussion completed  [] Existing advance directive reviewed with patient; no changes to patient's previously recorded wishes  [] New Advance Directive completed  [] Portable Do Not Rescitate prepared for Provider review and signature  [] POLST/POST/MOLST/MOST prepared for Provider review and signature      Follow-up plan:    [] Schedule follow-up conversation to continue planning  [] Referred individual to Provider for additional questions/concerns   [] Advised patient/agent/surrogate to review completed ACP document and update if needed with changes in condition, patient preferences or care setting    [x] This note routed to one or more involved healthcare providers

## 2022-07-08 NOTE — PROGRESS NOTES
Pt admitted to rm [phone] from [facility] ED. Pt admitted with abdominal and chest pain. Will have stress test tomorrow. Wife in room with patient. Pt awake and AAO UAL in room. Doffed clothes and donned hospital gown and tele to be placed. Pt oriented to room and call light and plan. Denies pain. VSS. Wife concerned about pt getting his blood pressure pills he missed today. Pt given water. Reminded of NPO and no caffeine after MN> Will monitor.

## 2022-07-08 NOTE — CONSULTS
GASTROENTEROLOGY INPATIENT CONSULTATION        IDENTIFYING DATA/REASON FOR CONSULTATION   PATIENT:  Cristiano Romero  MRN:  [id number]  ADMIT DATE: 7/7/2022  TIME OF EVALUATION: 7/8/2022 1:49 PM  HOSPITAL STAY:   LOS: 0 days     REASON FOR CONSULTATION:  Epigastric pain    HISTORY OF PRESENT ILLNESS   Cristiano Romero is a 69 y.o. male with a PMH of HTN and HLD who presented on 7/7/2022 with upper abdominal pain occurring for the past 6 to 8 days. He describes a \"discomfort\" and \"tight\" feeling across his upper abdomen below his rib cage, worse in his epigastric area and occasionally worse in his right upper quadrant. The discomfort seems to wax and wane. He rates the severity at its worst a 4-5 out of 10 and at its least of 0.5 out of 10. It is not worse with eating but his oral intake has been less than usual lately due to the discomfort. Sometimes eating makes the pain better. He denies nausea or vomiting. He takes Aleve frequently (couple of times per week). He denies history of heartburn. He denies dysphagia. His bowel pattern has been slightly irregular over the past couple weeks which he attributes to stopping his usual daily bowel supplement (LBS-II). His last BM was yesterday morning. Stool was formed, brown, no blood. His pain was slightly better after his BM. CT A/P showed no acute findings. No acute biliary findings. There was a probable small calculus in the gallbladder neck. Blood work notes bili of 1.4 which has been elevated in the past.  LFTs otherwise normal.  Lipase normal.  Troponin normal and stress test today was unremarkable.       Prior Endoscopic Evaluations:  Colonoscopy with Dr. Igor Taylor:  He has hx of colon polyps and diverticulosis    PAST MEDICAL, SURGICAL, FAMILY, and SOCIAL HISTORY     Past Medical History:   Diagnosis Date    Abnormal finding 7/1/08    mitch    Abnormal finding     Hemoccult-- 7/20/2009 guiac neg// PSA-- 1/7/2011 .80     Cancer ([address] basal cell left ear, right cheek    Carpal tunnel syndrome on right     Dermatophytosis of nail     Disturbance of skin sensation     Hypercholesteremia     Hypertension     Impaired fasting glucose     Kidney stone     Routine general medical examination at a health care facility     Special screening for malignant neoplasm of prostate     TGA (transient global amnesia)     Weight gain      Past Surgical History:   Procedure Laterality Date    ANKLE SURGERY      right ankle plate     CARPAL TUNNEL RELEASE      right hand    COLONOSCOPY  07    COLONOSCOPY  2018    polyp dr jonas check in 5 years    SKIN CANCER EXCISION      basal cell ear and cheek     Family History   Problem Relation Age of Onset    Cancer Mother         pancreatic cancer     Heart Attack Father     Cancer Sister         throat    Hypertension Brother     Other Brother         cabg     Social History     Socioeconomic History    Marital status:      Spouse name: None    Number of children: None    Years of education: None    Highest education level: None   Occupational History    None   Tobacco Use    Smoking status: Former Smoker     Packs/day: 1.00     Years: 10.00     Pack years: 10.00     Quit date: 1990     Years since quittin.5    Smokeless tobacco: Never Used   Vaping Use    Vaping Use: Never used   Substance and Sexual Activity    Alcohol use: Yes     Comment: 2 shots/day    Drug use: Not Currently     Types: Marijuana [place])    Sexual activity: Not Currently   Other Topics Concern    None   Social History Narrative    None     Social Determinants of Health     Financial Resource Strain: Low Risk     Difficulty of Paying Living Expenses: Not hard at all   Food Insecurity: No Food Insecurity    Worried About Running Out of Food in the Last Year: Never true    Jesús of Food in the Last Year: Never true   Transportation Needs:     Lack of Transportation (Medical):  Not on file    Lack of Transportation (Non-Medical):  Not on file   Physical Activity:     Days of Exercise per Week: Not on file    Minutes of Exercise per Session: Not on file   Stress:     Feeling of Stress : Not on file   Social Connections:     Frequency of Communication with Friends and Family: Not on file    Frequency of Social Gatherings with Friends and Family: Not on file    Attends Taoist Services: Not on file    Active Member of Clubs or Organizations: Not on file    Attends Club or Organization Meetings: Not on file    Marital Status: Not on file   Intimate Partner Violence:     Fear of Current or Ex-Partner: Not on file    Emotionally Abused: Not on file    Physically Abused: Not on file    Sexually Abused: Not on file   Housing Stability:     Unable to Pay for Housing in the Last Year: Not on file    Number of Places Lived in the Last Year: Not on file    Unstable Housing in the Last Year: Not on file       MEDICATIONS   SCHEDULED:  thiamine mononitrate, 100 mg, Daily  multivitamin, 1 tablet, Daily  losartan, 100 mg, Daily  atorvastatin, 20 mg, Nightly  sodium chloride flush, 5-40 mL, 2 times per day  enoxaparin, 30 mg, BID  aspirin, 81 mg, Daily  amLODIPine, 2.5 mg, Daily  docusate sodium, 100 mg, Daily      FLUIDS/DRIPS:     sodium chloride       PRNs: metaxalone, 800 mg, TID PRN  hydrocortisone, , BID PRN  sodium chloride flush, 5-40 mL, PRN  sodium chloride, , PRN  ondansetron, 4 mg, Q8H PRN   Or  ondansetron, 4 mg, Q6H PRN  acetaminophen, 650 mg, Q6H PRN  polyethylene glycol, 17 g, Daily PRN  morphine, 2 mg, Q4H PRN  acetaminophen, 650 mg, Q4H PRN      ALLERGIES:  He   Allergies   Allergen Reactions    Bee Venom Swelling     Has epi-pen    Penicillins Hives and Itching    Vicodin [Hydrocodone-Acetaminophen] Rash       REVIEW OF SYSTEMS   Pertinent ROS noted in HPI    PHYSICAL EXAM     Vitals:    07/08/22 0345 07/08/22 0911 07/08/22 0957 07/08/22 1204   BP: (!) 157/90  (!) 184/97 (!) 153/84   Pulse: 56   62   Resp: 18   18   Temp: 98.2 °F (36.8 °C)   98.3 °F (36.8 °C)   TempSrc: Oral   Oral   SpO2: 97% 98%  95%   Weight:       Height:           I/O last 3 completed shifts: In: 240 [P.O.:240]  Out: -       Physical Exam:  General appearance: alert, cooperative, no distress, appears stated age  Eyes: Anicteric  Head: Normocephalic, without obvious abnormality  Lungs: clear to auscultation bilaterally, Normal Effort  Heart: regular rate and rhythm, normal S1 and S2, no murmurs or rubs  Abdomen: soft, non-distended, non-tender. Bowel sounds normal. No masses,  no organomegaly. Extremities: atraumatic, no cyanosis or edema  Skin: warm and dry, no jaundice  Neuro: Grossly intact, A&OX3      LABS AND IMAGING   Laboratory   Recent Labs     07/07/22  1320 07/08/22  0847   WBC 5.2 5.8   HGB 16.4 15.9   HCT 47.3 46.6   MCV 94.2 97.3    140     Recent Labs     07/07/22  1320      K 4.0      CO2 21   BUN 12   CREATININE 0.7*     Recent Labs     07/07/22  1320   AST 19   ALT 20   BILITOT 1.4*   ALKPHOS 68     Recent Labs     07/07/22  1320   LIPASE 28.0     No results for input(s): PROTIME, INR in the last 72 hours. Imaging  NM Cardiac Stress Test Nuclear Imaging   Final Result      CT ABDOMEN PELVIS W IV CONTRAST Additional Contrast? None   Final Result   1. No acute findings within the abdomen or pelvis. Colonic diverticulosis   with no acute features. 2. Punctate left nephrolithiasis. No evidence of obstructive uropathy. 3. Moderate aortoiliac atherosclerotic plaque. Fusiform aneurysmal changes   in the infrarenal abdominal aorta at 2.6 cm maximally. Imaging follow-up   recommended every 5 years. RECOMMENDATIONS:   2.6 cm infrarenal abdominal aortic aneurysm. Recommend follow-up every 5   years. Reference: J Am Dwigth Radiol 9260;34:045-107. XR CHEST PORTABLE   Final Result   No acute cardiopulmonary disease. US ABDOMEN LIMITED Specify organ? LIVER, GALLBLADDER    (Results Pending)         ASSESSMENT AND RECOMMENDATIONS   69 y.o. male with a PMH of HTN and HLD who presented on 7/7/2022 with upper abdominal pain. IMPRESSION:  1. Upper abdominal pain. Concern for possible PUD given frequent NSAID use. He ate today therefore unable to proceed with EGD this afternoon. He has no signs of bleeding. Will treat medically with PPI and can consider outpt EGD. His blood work noted elevated bili o/w LFTs normal.  CT showed probable small calculus in the gallbladder neck. Will fractionate bili and order RUQ US. RECOMMENDATIONS:    Fractionate bilirubin  RUQ US  Protonix daily     If you have any questions or need any further information, please feel free to contact our consult team.  Thank you for allowing us to participate in the care of Rod Barth. The note was completed using Dragon voice recognition transcription. Every effort was made to ensure accuracy; however, inadvertent transcription errors may be present despite my best efforts to edit errors.       Roxanne Nguyen PA-C

## 2022-07-09 VITALS
HEART RATE: 66 BPM | RESPIRATION RATE: 18 BRPM | HEIGHT: 73 IN | SYSTOLIC BLOOD PRESSURE: 184 MMHG | WEIGHT: 229.28 LBS | BODY MASS INDEX: 30.39 KG/M2 | DIASTOLIC BLOOD PRESSURE: 48 MMHG | OXYGEN SATURATION: 97 % | TEMPERATURE: 97.6 F

## 2022-07-09 PROCEDURE — 2580000003 HC RX 258: Performed by: INTERNAL MEDICINE

## 2022-07-09 PROCEDURE — G0378 HOSPITAL OBSERVATION PER HR: HCPCS

## 2022-07-09 PROCEDURE — 6370000000 HC RX 637 (ALT 250 FOR IP): Performed by: INTERNAL MEDICINE

## 2022-07-09 PROCEDURE — 6370000000 HC RX 637 (ALT 250 FOR IP): Performed by: PEDIATRICS

## 2022-07-09 PROCEDURE — 6370000000 HC RX 637 (ALT 250 FOR IP): Performed by: PHYSICIAN ASSISTANT

## 2022-07-09 RX ORDER — PANTOPRAZOLE SODIUM 40 MG/1
40 TABLET, DELAYED RELEASE ORAL
Qty: 30 TABLET | Refills: 0 | Status: SHIPPED | OUTPATIENT
Start: 2022-07-10

## 2022-07-09 RX ADMIN — SODIUM CHLORIDE, PRESERVATIVE FREE 10 ML: 5 INJECTION INTRAVENOUS at 08:37

## 2022-07-09 RX ADMIN — Medication 100 MG: at 08:08

## 2022-07-09 RX ADMIN — METAXALONE 800 MG: 800 TABLET ORAL at 00:43

## 2022-07-09 RX ADMIN — ASPIRIN 81 MG: 81 TABLET, CHEWABLE ORAL at 08:08

## 2022-07-09 RX ADMIN — THERA TABS 1 TABLET: TAB at 08:08

## 2022-07-09 RX ADMIN — PANTOPRAZOLE SODIUM 40 MG: 40 TABLET, DELAYED RELEASE ORAL at 05:38

## 2022-07-09 RX ADMIN — LOSARTAN POTASSIUM 100 MG: 100 TABLET, FILM COATED ORAL at 08:08

## 2022-07-09 RX ADMIN — DOCUSATE SODIUM 100 MG: 100 CAPSULE, LIQUID FILLED ORAL at 08:08

## 2022-07-09 RX ADMIN — AMLODIPINE BESYLATE 2.5 MG: 5 TABLET ORAL at 08:08

## 2022-07-09 ASSESSMENT — PAIN SCALES - GENERAL: PAINLEVEL_OUTOF10: 0

## 2022-07-09 NOTE — PROGRESS NOTES
Written and verbal DC instruction reviewed with pt. Pt states understanding. All questions answered. IV removed without complications. Dressing clean dry and intact. Pt ambulated to vehicle with personal belongings.      Electronically signed by Jeb Mckeon RN on 7/9/2022 at 11:04 AM

## 2022-07-09 NOTE — DISCHARGE SUMMARY
Hospital Medicine Discharge Summary    Patient: Carney Eisenmenger     Gender: male  : 1945   Age: 69 y.o. MRN: [id number]    Admitting Physician: No admitting provider for patient encounter. Discharge Physician: Peter Leon MD     Code Status: Full Code     Admit Date: 2022   Discharge Date:   22    Disposition:  Home    Discharge Diagnoses: Active Hospital Problems    Diagnosis Date Noted    Chest pain, rule out acute myocardial infarction [R07.9] 2022     Priority: Medium       Follow-up appointments:  one week    Outpatient to do list: F/U with PCP and GI    Condition at Discharge:  Stable    Hospital Course:   69 y.o. male who presented to [facility] with report of upper abdominal tightness that radiated to the right upper quadrant. He reports in pressing on his abdomen it felt hard. He reports the sensation improved with passing stools. He reports various consistencies to his stools lately. He reports having 2 BMs today and 1 yesterday. No blood in stool. He reports feeling distended recently, but not tonight. He reports presently feeling \"95% fine. \" But his stomach still doesn't feel quite right. This is in the context of nitropaste patch present on his right shoulder.      He had a stress test about 8-9 years ago - in [facility] area - he reports he struggled with the exercise portion.      He reports for exercise he walks briskly - walks about 1/2 mile per day. Previously walked about 2 miles per day, he reports slowing down due to the weather outside - hot/humid. He lives on [address] and maintains it. No associated abdominal or chest pressure with activity.      He was given a GI cocktail at [facility] ED, but reported no improvement.      Placed in observation. SPECT:    Normal myocardial perfusion study.    Normal myocardial perfusion.    Normal LV size and systolic function.    Overall findings represent a low risk study.      RUQ US:  Unremarkable right upper quadrant ultrasound. Seen by GI. Will be on PPI. Counseled to stop EtOH. F/U with PCP and GI. Discharge Medications:   Current Discharge Medication List      START taking these medications    Details   pantoprazole (PROTONIX) 40 MG tablet Take 1 tablet by mouth every morning (before breakfast)  Qty: 30 tablet, Refills: 0           Current Discharge Medication List        Current Discharge Medication List      CONTINUE these medications which have NOT CHANGED    Details   amLODIPine (NORVASC) 2.5 MG tablet TAKE 1 TABLET BY MOUTH DAILY  Qty: 90 tablet, Refills: 2      losartan (COZAAR) 100 MG tablet TAKE 1 TABLET BY MOUTH DAILY  Qty: 90 tablet, Refills: 2      atorvastatin (LIPITOR) 20 MG tablet TAKE 1 TABLET BY MOUTH AT BEDTIME  Qty: 90 tablet, Refills: 2    Associated Diagnoses: Pure hypercholesterolemia      EPINEPHrine (EPIPEN) 0.3 MG/0.3ML SOAJ injection Use as directed for allergic reaction  Qty: 1 each, Refills: 0      hydrocortisone 2.5 % cream Apply topically 2 times daily. prn  Qty: 45 g, Refills: 0      aspirin (ADULT ASPIRIN EC LOW STRENGTH) 81 MG EC tablet Take 81 mg by mouth daily. Multiple Vitamin (MULTIVITAMIN) capsule Take 1 capsule by mouth daily. Current Discharge Medication List        Discharge Exam:    BP (!) 184/48   Pulse 66   Temp 97.6 °F (36.4 °C) (Oral)   Resp 18   Ht 6' 1\" (1.854 m)   Wt 229 lb 4.5 oz (104 kg)   SpO2 97%   BMI 30.25 kg/m²   General appearance:  NAD  HEENT:   Normal cephalic, atraumatic, moist mucous membranes, no oropharyngeal erythema or exudate  Neck: Supple, trachea midline, no anterior cervical or SC LAD  Heart[de-identified] Normal s1/s2, RRR, no murmurs, gallops, or rubs. No leg edema  Lungs: No use of accessory musclesNormal respiratory effort. Clear to auscultation, bilaterally without Rales/Wheezes/Rhonchi.   Abdomen: Soft, non-tender, non-distended, bowel sounds present, no masses  Musculoskeletal:  No clubbing, no cyanosis, no edema  Skin: No lesion or masses  Neurologic:  Neurovascularly intact without any focal sensory/motor deficits. Cranial nerves: II-XII intact, grossly non-focal.  Psychiatric:  A & O x3  Neuro: Grossly intact, moves all four extremities     Labs: For convenience and continuity at follow-up the following most recent labs are provided:    Lab Results   Component Value Date/Time    WBC 5.8 07/08/2022 08:47 AM    HGB 15.9 07/08/2022 08:47 AM    HCT 46.6 07/08/2022 08:47 AM    MCV 97.3 07/08/2022 08:47 AM     07/08/2022 08:47 AM     07/07/2022 01:20 PM    K 4.0 07/07/2022 01:20 PM     07/07/2022 01:20 PM    CO2 21 07/07/2022 01:20 PM    BUN 12 07/07/2022 01:20 PM    CREATININE 0.7 07/07/2022 01:20 PM    CALCIUM 9.8 07/07/2022 01:20 PM    BNP 10.5 12/07/2012 06:15 PM    ALKPHOS 68 07/07/2022 01:20 PM    ALT 20 07/07/2022 01:20 PM    AST 19 07/07/2022 01:20 PM    BILITOT 1.6 07/08/2022 08:47 AM    BILIDIR 0.3 07/08/2022 08:47 AM    LABALBU 4.3 07/07/2022 01:20 PM    LDLCALC 69 10/12/2021 07:38 AM    TRIG 66 10/12/2021 07:38 AM     No results found for: INR    Radiology:  CT ABDOMEN PELVIS W IV CONTRAST Additional Contrast? None    Result Date: 7/8/2022  EXAMINATION: CT OF THE ABDOMEN AND PELVIS WITH CONTRAST 7/7/2022 1:15 pm TECHNIQUE: CT of the abdomen and pelvis was performed with the administration of intravenous contrast. Multiplanar reformatted images are provided for review. Automated exposure control, iterative reconstruction, and/or weight based adjustment of the mA/kV was utilized to reduce the radiation dose to as low as reasonably achievable.  COMPARISON: 09/30/2012 HISTORY: ORDERING SYSTEM PROVIDED HISTORY: epig/ruq pain TECHNOLOGIST PROVIDED HISTORY: Additional Contrast?->None Reason for exam:->epig/ruq pain Decision Support Exception - unselect if not a suspected or confirmed emergency medical condition->Emergency Medical Condition (MA) Reason for Exam: epigastric pain and RUQ pain, N,V, D Relevant Medical/Surgical History: hx kidney stones FINDINGS: Lower Chest: No acute infiltrate at the lung bases. Organs: The liver, spleen, pancreas and adrenal glands are unremarkable. No acute biliary findings. Probable small calculus in the gallbladder neck. Bilateral renal cysts with no imaging follow-up indicated. Punctate calculus in the lower pole of the left kidney. No evidence of obstructive uropathy. GI/Bowel: The stomach and duodenal sweep are unremarkable. No small bowel distension. No evidence of appendicitis. Colonic diverticulosis with no acute features. Pelvis: No pelvic mass or free pelvic fluid. The prostate is borderline in size. Mild bladder wall thickening, likely accentuated by incomplete distension. Peritoneum/Retroperitoneum: Focal aneurysmal change in the mid infrarenal abdominal aorta and at the aortic bifurcation measuring 2.6 cm maximally. Moderate aortoiliac atherosclerotic plaque. No retroperitoneal adenopathy or upper abdominal ascites. Bones/Soft Tissues: No acute osseous or soft tissue abnormality. Fat containing bilateral inguinal hernias as well as small umbilical hernia. 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 1. No acute findings within the abdomen or pelvis. Colonic diverticulosis with no acute features. 2. Punctate left nephrolithiasis. No evidence of obstructive uropathy. 3. Moderate aortoiliac atherosclerotic plaque. Fusiform aneurysmal changes in the infrarenal abdominal aorta at 2.6 cm maximally. Imaging follow-up recommended every 5 years. RECOMMENDATIONS: 2.6 cm infrarenal abdominal aortic aneurysm. Recommend follow-up every 5 years. Reference: J Am Dwight Radiol 8238;34:449-088. XR CHEST PORTABLE    Result Date: 7/7/2022  EXAMINATION: ONE XRAY VIEW OF THE CHEST 7/7/2022 1:14 pm COMPARISON: 12/07/2012.  HISTORY: ORDERING SYSTEM PROVIDED HISTORY: upright XR, abd pain TECHNOLOGIST PROVIDED HISTORY: Reason for exam:->upright XR, abd pain Reason for Exam: upper abd pain FINDINGS: The cardiomediastinal silhouette is unremarkable. The lungs are clear. No infiltrate, pleural fluid or focal process is identified. No acute cardiopulmonary disease. US ABDOMEN LIMITED Specify organ? LIVER, GALLBLADDER    Result Date: 7/8/2022  EXAMINATION: RIGHT UPPER QUADRANT ULTRASOUND 7/8/2022 6:10 pm COMPARISON: None. HISTORY: ORDERING SYSTEM PROVIDED HISTORY: RUQ abdominal pain, elevated bili 1.4 TECHNOLOGIST PROVIDED HISTORY: Reason for exam:->RUQ abdominal pain, elevated bili 1.4 Specify organ?->LIVER Specify organ?->GALLBLADDER FINDINGS: LIVER:  The liver demonstrates normal echogenicity without evidence of intrahepatic biliary ductal dilatation. BILIARY SYSTEM:  Gallbladder is unremarkable without evidence of pericholecystic fluid, wall thickening or stones. Negative sonographic Harrison's sign. Common bile duct is within normal limits measuring 3 mm in diameter. RIGHT KIDNEY: The right kidney is grossly unremarkable without evidence of hydronephrosis. PANCREAS:  Visualized portions of the pancreas are unremarkable. OTHER: No evidence of right upper quadrant ascites. Unremarkable right upper quadrant ultrasound.      NM Cardiac Stress Test Nuclear Imaging    Result Date: 7/8/2022  Cardiac Perfusion Imaging  Demographics   Patient Name       KEESHA Gray   Date of Study      07/08/2022         Gender               Male   Patient Number     [id number]         Date of Birth        1945   Visit Number       [id number]          Age                  69 year(s)   Accession Number   [id number]         Room Number          7879   Corporate ID       [id number]            NM Technician        Axel Perdomo, RT                                                             Rilla Cushing   Nurse              Ross Walker,   Interpreting         Liss Kennedy.                     RN                 Physician            Maria E Colorado MD   Ordering Physician Aneta Aiken MD   The procedure was explained in detail to the patient. Risks,  complications and alternative treatments were reviewed. Written consent  was obtained. Procedure Procedure Type:   Nuclear Stress Test:NM MYOCARDIAL SPECT REST EXERCISE OR RX   Study location: [facility] - Nuclear Medicine   Indications: Chest pain. Hospital Status: Outpatient. Height: 73 inches Weight: 232 pounds  Risk Factors   The patient risk factors include:obesity, physical activity, former tobacco  use, treated hypercholesterolemia, treated hypertension, dyslipidemia and (  years not smokin). Conclusions   Summary  Normal myocardial perfusion study. Normal myocardial perfusion. Normal LV size and systolic function. Overall findings represent a low risk study. Stress Protocols   Resting ECG  nsr  Left axis deviation  non specific ST Changes   Resting HR:71 bpm  Resting BP:180/105 mmHg   Pre-stress physical exam: No complaints of  chest pain. Heart and lung sounds  unremarkable. 02 saturation % on room air. Adult plus BP cuff used. Troponin T  negative times two. To proceed with  Lexiscan Myoview stress test.  Stress Protocol:Pharmacologic - Lexiscan's  Peak HR:104 bpm                  HR/BP product:  Peak BP:192/93 mmHg  Predicted HR: 144 bpm  % of predicted HR: 72  Test duration: 1 min and 40 sec  Reason for termination:Completed   ECG Findings  nsr  Left axis deviation  non specific ST Changes   Arrhythmias  None. Symptoms  Lexiscan 0.4 mg IV given followed by brief  shortness of breath and headache. 02 saturation 98%  on room air. No complaints of chest pain. Patient  remained hypertensive following Lexiscan injection  (/98). Floor RN to bring patient's BP  medications to stress lab. Complications  Procedure complication was none.   Procedure Medications   - Lexiscan I.V. 0.4 mg.10 sec  Imaging Protocols   - One Day   Rest

## 2022-07-09 NOTE — DISCHARGE INSTR - DIET

## 2022-07-09 NOTE — CARE COORDINATION
Discharge order noted. Chart reviewed. Plan is to return home with family. No additional discharge needs identified at this time.     Electronically signed by Asiya Manriquez RN MSN on 7/9/2022 at 8:42 AM

## 2022-07-09 NOTE — PLAN OF CARE
Problem: Discharge Planning  Goal: Discharge to home or other facility with appropriate resources  7/8/2022 2326 by Ramsey Bui RN  Outcome: Progressing     Problem: Pain  Goal: Verbalizes/displays adequate comfort level or baseline comfort level  7/8/2022 2326 by Ramsey Bui RN  Outcome: Progressing     Problem: Safety - Adult  Goal: Free from fall injury  7/8/2022 2326 by Ramsey Bui RN  Outcome: Progressing  Flowsheets (Taken 7/8/2022 2325)  Free From Fall Injury: Instruct family/caregiver on patient safety

## 2022-07-09 NOTE — ACP (ADVANCE CARE PLANNING)
Advanced Care Planning Note. Purpose of Encounter: Advanced care planning in light of abdominal pain  Parties In Attendance: Patient  Decisional Capacity: Yes  Subjective: Patient with epigastric discomfort  Objective: Hg `15.9 on 22   Goals of Care Determination: Patient wants full support (CPR, vent, surgery, Hd, trach, PEG)  Plan:  GI consult. SPECT, RUQ US  Code Status: Full code   Time spent on Advanced care Plannin minutes  Advanced Care Planning Documents: Completed advanced directives on chart, wife is the POA.     Lokesh Blood MD  2022 6:26 AM

## 2022-07-11 ENCOUNTER — TELEPHONE (OUTPATIENT)
Dept: FAMILY MEDICINE CLINIC | Age: 77
End: 2022-07-11

## 2022-07-11 NOTE — TELEPHONE ENCOUNTER
Wilfred[facility] Transitions Initial Follow Up Call    Outreach made within 2 business days of discharge: Yes    Patient: Merced Cherry Patient : 1945   MRN: [id number]  Reason for Admission: There are no discharge diagnoses documented for the most recent discharge. Discharge Date: 22       Spoke with: Oc Morton     Discharge department/facility: [facility] Interactive Patient Contact:  Was patient able to fill all prescriptions: Yes  Was patient instructed to bring all medications to the follow-up visit: Yes  Is patient taking all medications as directed in the discharge summary?  Yes  Does patient understand their discharge instructions: Yes  Does patient have questions or concerns that need addressed prior to 7-14 day follow up office visit: no    Scheduled appointment with PCP within 7-14 days    Follow Up  Future Appointments   Date Time Provider Rasheed Hawk   2022  8:30 AM Zenaida Delvalle MD [address] [place] [address]

## 2022-07-14 ENCOUNTER — OFFICE VISIT (OUTPATIENT)
Dept: FAMILY MEDICINE CLINIC | Age: 77
End: 2022-07-14
Payer: MEDICARE

## 2022-07-14 VITALS
DIASTOLIC BLOOD PRESSURE: 84 MMHG | TEMPERATURE: 98.1 F | SYSTOLIC BLOOD PRESSURE: 136 MMHG | HEART RATE: 68 BPM | WEIGHT: 234 LBS | HEIGHT: 73 IN | BODY MASS INDEX: 31.01 KG/M2

## 2022-07-14 DIAGNOSIS — Z09 HOSPITAL DISCHARGE FOLLOW-UP: ICD-10-CM

## 2022-07-14 DIAGNOSIS — R10.10 PAIN OF UPPER ABDOMEN: Primary | ICD-10-CM

## 2022-07-14 DIAGNOSIS — I10 ESSENTIAL HYPERTENSION: ICD-10-CM

## 2022-07-14 DIAGNOSIS — R73.09 OTHER ABNORMAL GLUCOSE: ICD-10-CM

## 2022-07-14 DIAGNOSIS — Q25.40 ABNORMALITY OF ABDOMINAL AORTA: ICD-10-CM

## 2022-07-14 PROCEDURE — 1123F ACP DISCUSS/DSCN MKR DOCD: CPT | Performed by: FAMILY MEDICINE

## 2022-07-14 PROCEDURE — 99214 OFFICE O/P EST MOD 30 MIN: CPT | Performed by: FAMILY MEDICINE

## 2022-07-14 ASSESSMENT — ENCOUNTER SYMPTOMS
CONSTIPATION: 0
COUGH: 0
NAUSEA: 0
VOMITING: 0
SHORTNESS OF BREATH: 0
DIARRHEA: 0
BLOOD IN STOOL: 0

## 2022-07-14 ASSESSMENT — PATIENT HEALTH QUESTIONNAIRE - PHQ9
SUM OF ALL RESPONSES TO PHQ QUESTIONS 1-9: 0
SUM OF ALL RESPONSES TO PHQ9 QUESTIONS 1 & 2: 0
SUM OF ALL RESPONSES TO PHQ QUESTIONS 1-9: 0
1. LITTLE INTEREST OR PLEASURE IN DOING THINGS: 0
2. FEELING DOWN, DEPRESSED OR HOPELESS: 0

## 2022-07-14 NOTE — PATIENT INSTRUCTIONS
Take the pantoprazole  Do see the specialist  See me back in December   See the specialist for the aorta in the stomach  See me in November

## 2022-07-14 NOTE — PROGRESS NOTES
Subjective:      Patient ID: Sierra Goodman is a 69 y.o. male. Chief Complaint   Patient presents with    Follow-Up from [facility] 7-7-2022 \"Chest Pain\"    6 Month Follow-Up     hypertension         Patient presents with: Follow-Up from Hospital: [facility] 7-7-2022 \"Chest Pain\"  6 Month Follow-Up: hypertension     Here for the above  Actually feeling very much better  meds reviewed  Here with wife vamsi    He is overall well   He has no other c/o    YOB: 1945    Date of Visit:  7/14/2022     -- Bee Venom -- Swelling    --  Has epi-pen   -- Penicillins -- Hives and Itching   -- Vicodin (Hydrocodone-Acetaminophen) -- Rash    Current Outpatient Medications:  pantoprazole (PROTONIX) 40 MG tablet, Take 1 tablet by mouth every morning (before breakfast), Disp: 30 tablet, Rfl: 0  amLODIPine (NORVASC) 2.5 MG tablet, TAKE 1 TABLET BY MOUTH DAILY, Disp: 90 tablet, Rfl: 2  losartan (COZAAR) 100 MG tablet, TAKE 1 TABLET BY MOUTH DAILY, Disp: 90 tablet, Rfl: 2  atorvastatin (LIPITOR) 20 MG tablet, TAKE 1 TABLET BY MOUTH AT BEDTIME, Disp: 90 tablet, Rfl: 2  EPINEPHrine (EPIPEN) 0.3 MG/0.3ML SOAJ injection, Use as directed for allergic reaction, Disp: 1 each, Rfl: 0  hydrocortisone 2.5 % cream, Apply topically 2 times daily. prn, Disp: 45 g, Rfl: 0  aspirin (ADULT ASPIRIN EC LOW STRENGTH) 81 MG EC tablet, Take 81 mg by mouth daily. , Disp: , Rfl:   Multiple Vitamin (MULTIVITAMIN) capsule, Take 1 capsule by mouth daily. , Disp: , Rfl:     No current facility-administered medications for this visit.      ---------------------------               07/14/22                      0910         ---------------------------   BP:          136/84         Site:    Left Upper Arm     Position:     Sitting        Cuff Size:   Large Adult      Pulse:         68           Temp:   98.1 °F (36.7 °C)   TempSrc:    Temporal        Weight: 234 lb (106.1 kg)   Height:  6' 1\" (1.854 m)   ---------------------------  Body mass index is 30.87 kg/m². Wt Readings from Last 3 Encounters:  07/14/22 : 234 lb (106.1 kg)  07/09/22 : 229 lb 4.5 oz (104 kg)  10/19/21 : 235 lb (106.6 kg)    BP Readings from Last 3 Encounters:  07/14/22 : 136/84  07/09/22 : (!) 184/48  10/19/21 : 136/82            Review of Systems   Constitutional: Negative for chills and fever. Respiratory: Negative for cough and shortness of breath. Cardiovascular: Negative for chest pain and palpitations. Gastrointestinal: Negative for blood in stool, constipation, diarrhea, nausea and vomiting. No gerd no dysphagia    Genitourinary: Negative for difficulty urinating, dysuria and hematuria. Objective:   Physical Exam  Constitutional:       General: He is not in acute distress. Appearance: Normal appearance. He is well-developed. He is not ill-appearing or diaphoretic. Cardiovascular:      Rate and Rhythm: Normal rate and regular rhythm. Heart sounds: Normal heart sounds. No murmur heard. No friction rub. No gallop. Comments:     Pulmonary:      Effort: Pulmonary effort is normal. No tachypnea, accessory muscle usage or respiratory distress. Breath sounds: Normal breath sounds. No decreased breath sounds, wheezing, rhonchi or rales. Chest:   Breasts:      Right: No supraclavicular adenopathy. Left: No supraclavicular adenopathy. Abdominal:      General: Bowel sounds are normal. There is no distension or abdominal bruit. Palpations: Abdomen is soft. There is no hepatomegaly, splenomegaly, mass or pulsatile mass. Tenderness: There is no abdominal tenderness. There is no guarding. Lymphadenopathy:      Cervical: No cervical adenopathy. Upper Body:      Right upper body: No supraclavicular adenopathy. Left upper body: No supraclavicular adenopathy. Skin:     General: Skin is warm and dry. Coloration: Skin is not pale. Nails:  There is no clubbing. Neurological:      Mental Status: He is alert. Assessment:       He   Diagnosis Orders   1. Pain of upper abdomen  FRANCIE - Esther Mullins MD, Gastroenterology, [facility]   2. Essential hypertension     3. Other abnormal glucose     4. Hospital discharge follow-up     5.  Abnormality of abdominal aorta  Randy Hernandez MD Vascular and Endovascular Surgery, [facility]      notes the pain was actually in the upper abd and felt hard and full and was better with passing stool food seems to help   Reviewed the w/u with him  He is feeling better actually   He is scheduled to have egd and colon with dr sullivan   Ct ok and discussed with him and not sure I feel need to f/u but will have him see dr Jassi Sims for the aorta  gxt with imaging ok on 7/8/22 7/8/22 cbc ok 7/7/22 cmp ok       Plan:      Take the pantoprazole  Do see the specialist  See me back in December   See the specialist for the aorta in the stomach  See me in November         Car Donaldson MD

## 2022-07-29 ENCOUNTER — OFFICE VISIT (OUTPATIENT)
Dept: VASCULAR SURGERY | Age: 77
End: 2022-07-29
Payer: MEDICARE

## 2022-07-29 VITALS — BODY MASS INDEX: 30.87 KG/M2 | SYSTOLIC BLOOD PRESSURE: 152 MMHG | WEIGHT: 234 LBS | DIASTOLIC BLOOD PRESSURE: 88 MMHG

## 2022-07-29 DIAGNOSIS — I10 ESSENTIAL HYPERTENSION: Primary | ICD-10-CM

## 2022-07-29 DIAGNOSIS — I71.40 ABDOMINAL AORTIC ANEURYSM (AAA) WITHOUT RUPTURE: ICD-10-CM

## 2022-07-29 DIAGNOSIS — E78.00 PURE HYPERCHOLESTEROLEMIA: ICD-10-CM

## 2022-07-29 PROCEDURE — 99204 OFFICE O/P NEW MOD 45 MIN: CPT | Performed by: SURGERY

## 2022-07-29 PROCEDURE — 1123F ACP DISCUSS/DSCN MKR DOCD: CPT | Performed by: SURGERY

## 2022-07-29 NOTE — PROGRESS NOTES
Daily Progress Note   Lou Baker MD      2022    Chief Complaint   Patient presents with    New Patient     Ref by Dr. Juan Heath for AAA. Pt says he didn't know he had it, saw it in his medical history, he did have a CAT scan done earlier this month. HISTORY OF PRESENT ILLNESS:                The patient is a 69 y.o. male who presents with history of high blood pressure and high cholesterol, and a recent CT scan showing a small aortic aneurysm of 2.4 cm.      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     Dermatophytosis of nail     Disturbance of skin sensation     Hypercholesteremia     Hypertension     Impaired fasting glucose     Kidney stone     Routine general medical examination at a health care facility     Special screening for malignant neoplasm of prostate     TGA (transient global amnesia)     Weight gain        Past Surgical History:   Procedure Laterality Date    ANKLE SURGERY      right ankle plate     CARPAL TUNNEL RELEASE      right hand    COLONOSCOPY  07    COLONOSCOPY  2018    polyp dr jonas check in 5 years    SKIN CANCER EXCISION      basal cell ear and cheek       Social History     Socioeconomic History    Marital status:      Spouse name: Not on file    Number of children: Not on file    Years of education: Not on file    Highest education level: Not on file   Occupational History    Not on file   Tobacco Use    Smoking status: Former     Packs/day: 1.00     Years: 10.00     Pack years: 10.00     Types: Cigarettes     Quit date: 1990     Years since quittin.5    Smokeless tobacco: Never   Vaping Use    Vaping Use: Never used   Substance and Sexual Activity    Alcohol use: Yes     Comment: 2 shots/day    Drug use: Not Currently     Types: Marijuana Cornelio Alberto)    Sexual activity: Not Currently   Other Topics Concern    Not on file   Social History Narrative    Not on file     Social Determinants of Health     Financial Resource Strain: Low Risk     Difficulty of Paying Living Expenses: Not hard at all   Food Insecurity: No Food Insecurity    Worried About Running Out of Food in the Last Year: Never true    Ran Out of Food in the Last Year: Never true   Transportation Needs: Not on file   Physical Activity: Not on file   Stress: Not on file   Social Connections: Not on file   Intimate Partner Violence: Not on file   Housing Stability: Not on file       Family History   Problem Relation Age of Onset    Cancer Mother         pancreatic cancer     Heart Attack Father     Cancer Sister         throat    Hypertension Brother     Other Brother         cabg         Current Outpatient Medications:     pantoprazole (PROTONIX) 40 MG tablet, Take 1 tablet by mouth every morning (before breakfast), Disp: 30 tablet, Rfl: 0    amLODIPine (NORVASC) 2.5 MG tablet, TAKE 1 TABLET BY MOUTH DAILY, Disp: 90 tablet, Rfl: 2    losartan (COZAAR) 100 MG tablet, TAKE 1 TABLET BY MOUTH DAILY, Disp: 90 tablet, Rfl: 2    atorvastatin (LIPITOR) 20 MG tablet, TAKE 1 TABLET BY MOUTH AT BEDTIME, Disp: 90 tablet, Rfl: 2    EPINEPHrine (EPIPEN) 0.3 MG/0.3ML SOAJ injection, Use as directed for allergic reaction, Disp: 1 each, Rfl: 0    hydrocortisone 2.5 % cream, Apply topically 2 times daily. prn, Disp: 45 g, Rfl: 0    Multiple Vitamin (MULTIVITAMIN) capsule, Take 1 capsule by mouth daily. , Disp: , Rfl:     aspirin 81 MG EC tablet, Take 81 mg by mouth daily.  (Patient not taking: Reported on 7/29/2022), Disp: , Rfl:     Bee venom, Penicillins, and Vicodin [hydrocodone-acetaminophen]    Vitals:    07/29/22 0849 07/29/22 0855   BP: (!) 150/88 (!) 152/88   Weight: 234 lb (106.1 kg)        Admission on 07/07/2022, Discharged on 07/09/2022   Component Date Value Ref Range Status    WBC 07/07/2022 5.2  4.0 - 11.0 K/uL Final    RBC 07/07/2022 5.02  4.20 - 5.90 M/uL Final    Hemoglobin 07/07/2022 16.4  13.5 - 17.5 g/dL Final    Hematocrit 07/07/2022 47.3  40.5 - 52.5 % Final    MCV 07/07/2022 94.2  80.0 - 100.0 fL Final    MCH 07/07/2022 32.7  26.0 - 34.0 pg Final    MCHC 07/07/2022 34.7  32.0 - 36.4 g/dL Final    RDW 07/07/2022 12.6  12.4 - 15.4 % Final    Platelets 80/57/7254 138  135 - 450 K/uL Final    MPV 07/07/2022 9.3  5.0 - 10.5 fL Final    Neutrophils % 07/07/2022 60.3  % Final    Immature Granulocytes % 07/07/2022 0.0  % Final    Lymphocytes % 07/07/2022 28.2  % Final    Monocytes % 07/07/2022 9.0  % Final    Eosinophils % 07/07/2022 1.9  % Final    Basophils % 07/07/2022 0.6  % Final    Neutrophils Absolute 07/07/2022 3.2  1.7 - 7.7 K/uL Final    Immature Granulocytes # 07/07/2022 0.0  0.0 - 0.2 K/uL Final    Lymphocytes Absolute 07/07/2022 1.5  1.0 - 5.1 K/uL Final    Monocytes Absolute 07/07/2022 0.5  0.0 - 1.3 K/uL Final    Eosinophils Absolute 07/07/2022 0.1  0.0 - 0.6 K/uL Final    Basophils Absolute 07/07/2022 0.0  0.0 - 0.2 K/uL Final    Sodium 07/07/2022 140  136 - 145 mmol/L Final    Potassium reflex Magnesium 07/07/2022 4.0  3.5 - 5.1 mmol/L Final    Chloride 07/07/2022 106  99 - 110 mmol/L Final    CO2 07/07/2022 21  21 - 32 mmol/L Final    Anion Gap 07/07/2022 13  3 - 16 Final    Glucose 07/07/2022 96  70 - 99 mg/dL Final    BUN 07/07/2022 12  7 - 20 mg/dL Final    Creatinine 07/07/2022 0.7 (A) 0.8 - 1.3 mg/dL Final    GFR Non- 07/07/2022 >60  >60 Final    Comment: >60 mL/min/1.73m2 EGFR, calc. for ages 25 and older using the  MDRD formula (not corrected for weight), is valid for stable  renal function. GFR  07/07/2022 >60  >60 Final    Comment: Chronic Kidney Disease: less than 60 ml/min/1.73 sq.m. Kidney Failure: less than 15 ml/min/1.73 sq.m. Results valid for patients 18 years and older.       Calcium 07/07/2022 9.8  8.3 - 10.6 mg/dL Final    Total Protein 07/07/2022 7.4  6.4 - 8.2 g/dL Final    Albumin 07/07/2022 4.3  3.4 - 5.0 g/dL Final    Albumin/Globulin Ratio 07/07/2022 1.4  1.1 - 2.2 Final    Total Bilirubin 07/07/2022 1.4 (A) 0.0 - 1.0 mg/dL Final    Alkaline Phosphatase 07/07/2022 68  40 - 129 U/L Final    ALT 07/07/2022 20  10 - 40 U/L Final    AST 07/07/2022 19  15 - 37 U/L Final    Lipase 07/07/2022 28.0  13.0 - 60.0 U/L Final    Troponin 07/07/2022 <0.01  <0.01 ng/mL Final    Methodology by Troponin T    Pro-BNP 07/07/2022 186  0 - 449 pg/mL Final    Comment: Methodology by NT-proBNP    An age-independent cutoff point of 300 pg/ml has a 98%  negative predictive value excluding acute heart failure. Values exceeding the age-related cutoff values (450 pg/mL if  age<50, 900 if 50-75 and 1800 if >75) has 90% sensitivity and  84% specificity for diagnosing acute HF. In patients with  renal compromise (eGFR<60) values greater than 1200pg/ml have  a diagnostic sensitivity and specificity of 89% and 72% for  acute HF.       Color, UA 07/07/2022 Yellow  Straw/Yellow Final    Clarity, UA 07/07/2022 Clear  Clear Final    Glucose, Ur 07/07/2022 Negative  Negative mg/dL Final    Bilirubin Urine 07/07/2022 Negative  Negative Final    Ketones, Urine 07/07/2022 80 (A) Negative mg/dL Final    Specific Gravity, UA 07/07/2022 1.020  1.005 - 1.030 Final    Blood, Urine 07/07/2022 TRACE-INTACT (A) Negative Final    pH, UA 07/07/2022 7.0  5.0 - 8.0 Final    Protein, UA 07/07/2022 TRACE (A) Negative mg/dL Final    Urobilinogen, Urine 07/07/2022 0.2  <2.0 E.U./dL Final    Nitrite, Urine 07/07/2022 Negative  Negative Final    Leukocyte Esterase, Urine 07/07/2022 TRACE (A) Negative Final    Microscopic Examination 07/07/2022 YES   Final    Urine Type 07/07/2022 Cleancatch   Final    Urine Reflex to Culture 07/07/2022 Not Indicated   Final    Ventricular Rate 07/07/2022 70  BPM Final    Atrial Rate 07/07/2022 70  BPM Final    P-R Interval 07/07/2022 180  ms Final    QRS Duration 07/07/2022 108  ms Final    Q-T Interval 07/07/2022 390  ms Final    QTc Calculation (Bazett) 07/07/2022 421  ms Final    P Axis 07/07/2022 36  degrees Final    R Axis 07/07/2022 -30  degrees Final    T Axis 07/07/2022 27  degrees Final    Diagnosis 07/07/2022 Normal sinus rhythmLeft axis deviationAbnormal ECGWhen compared with ECG of 01-MAY-2020 11:08,No significant change was foundConfirmed by Olamide Brown ([id number]) on 7/7/2022 3:24:57 PM   Final    WBC, UA 07/07/2022 3-5  0 - 5 /HPF Final    RBC, UA 07/07/2022 0-2  0 - 4 /HPF Final    Bacteria, UA 07/07/2022 Rare (A) None Seen /HPF Final    WBC 07/08/2022 5.8  4.0 - 11.0 K/uL Final    RBC 07/08/2022 4.78  4.20 - 5.90 M/uL Final    Hemoglobin 07/08/2022 15.9  13.5 - 17.5 g/dL Final    Hematocrit 07/08/2022 46.6  40.5 - 52.5 % Final    MCV 07/08/2022 97.3  80.0 - 100.0 fL Final    MCH 07/08/2022 33.1  26.0 - 34.0 pg Final    MCHC 07/08/2022 34.0  31.0 - 36.0 g/dL Final    RDW 07/08/2022 13.5  12.4 - 15.4 % Final    Platelets 19/48/8367 140  135 - 450 K/uL Final    MPV 07/08/2022 7.7  5.0 - 10.5 fL Final    Troponin 07/08/2022 <0.01  <0.01 ng/mL Final    Methodology by Troponin T    Troponin 07/08/2022 <0.01  <0.01 ng/mL Final    Methodology by Troponin T    Left Ventricular Ejection Fraction 07/08/2022 77   Final-Edited    LVEF MODALITY 07/08/2022 Nuclear   Final-Edited    Total Bilirubin 07/08/2022 1.6 (A) 0.0 - 1.0 mg/dL Final    Bilirubin, Direct 07/08/2022 0.3  0.0 - 0.3 mg/dL Final    Comment: Specimen hemolysis has exceeded the interference as defined by Roche. Value may be falsely increased. Suggest recollection if clinically  indicated. Bilirubin, Indirect 07/08/2022 1.3 (A) 0.0 - 1.0 mg/dL Final       Review of Systems    Physical Exam  Constitutional:       Appearance: He is well-developed. HENT:      Head: Normocephalic and atraumatic.         Right Ear: External ear normal.      Left Ear: External ear normal.   Eyes:      General: No scleral icterus. Pupils: Pupils are equal, round, and reactive to light. Neck:      Thyroid: No thyromegaly. Vascular: No carotid bruit or JVD. Trachea: No tracheal deviation. Cardiovascular:      Rate and Rhythm: Normal rate. Pulses:           Carotid pulses are 2+ on the right side and 2+ on the left side. Femoral pulses are 2+ on the right side and 2+ on the left side. Popliteal pulses are 2+ on the right side and 2+ on the left side. Dorsalis pedis pulses are 0 on the right side and 1+ on the left side. Posterior tibial pulses are 2+ on the right side and 2+ on the left side. Heart sounds: Normal heart sounds. No murmur heard. No gallop. Comments: Doppler 7/29/22:  Right DP - Monophasic  Right PT - Biphasic    Left DP - Biphasic  Left PT - Biphasic  Pulmonary:      Effort: Pulmonary effort is normal.      Breath sounds: Normal breath sounds. No wheezing or rales. Chest:      Chest wall: No tenderness. Breasts:     Right: No supraclavicular adenopathy. Left: No supraclavicular adenopathy. Abdominal:      General: Bowel sounds are normal. There is no distension or abdominal bruit. Palpations: Abdomen is soft. There is no mass. Tenderness: There is no abdominal tenderness. There is no guarding or rebound. Hernia: No hernia is present. There is no hernia in the ventral area or left inguinal area. Genitourinary:     Comments: RECTAL EXAM  &  Guaiac NOT INDICATED  Musculoskeletal:         General: No tenderness. Normal range of motion. Cervical back: Normal range of motion and neck supple. Legs:    Lymphadenopathy:      Head:      Right side of head: No submental, submandibular, preauricular or occipital adenopathy. Left side of head: No submental, submandibular, preauricular or occipital adenopathy. Cervical: No cervical adenopathy.       Right cervical: No superficial, deep or posterior cervical adenopathy. Left cervical: No superficial, deep or posterior cervical adenopathy. Upper Body:      Right upper body: No supraclavicular or pectoral adenopathy. Left upper body: No supraclavicular or pectoral adenopathy. Skin:     General: Skin is warm and dry. Neurological:      Mental Status: He is alert and oriented to person, place, and time. Cranial Nerves: No cranial nerve deficit. Sensory: No sensory deficit. Motor: No tremor, atrophy or abnormal muscle tone. Coordination: Coordination normal.      Gait: Gait normal.   Psychiatric:         Behavior: Behavior normal.         Thought Content: Thought content normal.         Judgment: Judgment normal.         ASSESSMENT:  CAT scan reviewed the 2.6 or 2.8 cm enlargement is very distal right at the bifurcation. Just above this it is 2.2 higher up it is also 2.6 cm. I explained to the family that there is barely even counts as an aneurysm and per protocol would need another follow-up in 4 5 years recommended ultrasound over CT. As far as his circulation he has good posterior tibial pulses and biphasic signals the only 1 is diminished is his right dorsalis pedis explained he only needs 1 good pulse and he has no complaints of claudication  Problem List Items Addressed This Visit          Medium    AAA (abdominal aortic aneurysm) ([address]       Unprioritized    Pure hypercholesterolemia    Essential hypertension - Primary       PLAN:  Have follow up US or CT Scan in 5 years to recheck. We will see him back as needed.           I Rai Downs CMA am scribing for and in the presence of Dr. James Mooney on07/29/22 at 10:36 AM.     Electronically signed by Darryle Moloney, MD on 7/29/2022 at 10:36 AM

## 2022-07-29 NOTE — Clinical Note
[facility]) - Vascular and Endovascular Surgeons, [address] [facility] [address]  [facility] [address]  Phone: [phone]  Fax: [phone]    Taisha Sorenson MD        July 29, 2022     Patient: Selestino Nissen   YOB: 1945   Date of Visit: 7/29/2022       To Whom It May Concern: It is my medical opinion that May Salazar {Work release (duty restriction):[id number]}. If you have any questions or concerns, please don't hesitate to call.     Sincerely,        Taisha Sorenson MD

## 2022-09-06 NOTE — PROGRESS NOTES
[address] time_0730__________        Surgery time________0900____    Take the following medications with a sip of water: Follow your MD/Surgeons pre-procedure instructions regarding your medications     Do not eat or drink anything after 12:00 midnight prior to your surgery. This includes water chewing gum, mints and ice chips. You may brush your teeth and gargle the morning of your surgery, but do not swallow the water     Please see your family doctor/pediatrician for a history and physical and/or concerning medications. Bring any test results/reports from your physicians office. If you are under the care of a heart doctor or specialist doctor, please be aware that you may be asked to them for clearance    You may be asked to stop blood thinners such as Coumadin, Plavix, Fragmin, Lovenox, etc., or any anti-inflammatories such as:  Aspirin, Ibuprofen, Advil, Naproxen prior to your surgery. We also ask that you stop any OTC medications such as fish oil, vitamin E, glucosamine, garlic, Multivitamins, COQ 10, etc.    We ask that you do not smoke 24 hours prior to surgery  We ask that you do not  drink any alcoholic beverages 24 hours prior to surgery     You must make arrangements for a responsible adult to take you home after your surgery. For your safety you will not be allowed to leave alone or drive yourself home. Your surgery will be cancelled if you do not have a ride home. Also for your safety, it is strongly suggested that someone stay with you the first 24 hours after your surgery. A parent or legal guardian must accompany a child scheduled for surgery and plan to stay at the hospital until the child is discharged. Please do not bring other children with you. For your comfort, please wear simple loose fitting clothing to the hospital.  Please do not bring valuables.     Do not wear any make-up or nail polish on your fingers or toes      For your safety, please do not wear any jewelry or body piercing's on the day of surgery. All jewelry must be removed. If you have dentures, they will be removed before going to operating room. For your convenience, we will provide you with a container. If you wear contact lenses or glasses, they will be removed, please bring a case for them. If you have a living will and a durable power of  for healthcare, please bring in a copy. As part of our patient safety program to minimize surgical site infections, we ask you to do the following:    Please notify your surgeon if you develop any illness between         now and the  day of your surgery. This includes a cough, cold, fever, sore throat, nausea,         or vomiting, and diarrhea, etc.   Please notify your surgeon if you experience dizziness, shortness         of breath or blurred vision between now and the time of your surgery. Do not shave your operative site 96 hours prior to surgery. For face and neck surgery, men may use an electric razor 48 hours   prior to surgery. You may shower the night before surgery or the morning of   your surgery with an antibacterial soap. You will need to bring a photo ID and insurance card    [facility] has an onsite pharmacy, would you like to utilize our pharmacy     If you will be staying overnight and use a C-pap machine, please bring   your C-pap to hospital     Our goal is to provide you with excellent care, therefore, visitors will be limited to two(2) in the room at a time so that we may focus on providing this care for you. Please contact pre-admission testing if you have any further questions. [facility] phone number:  [phone] PAT fax number:  [phone]  Please note these are generalized instructions for all surgical cases, you may be provided with more specific instructions according to your surgery.     C-Difficile admission screening and protocol:       * Admitted with diarrhea? [] YES    [x]  NO     *Prior history of C-Diff. In last 3 months? [] YES    [x]  NO     *Antibiotic use in the past 6-8 weeks? []  NO    [x]  YES                 If yes, which ANTIBIOTIC AND REASON______     *Prior hospitalization or nursing home in the last month? []  YES    [x]  NO        SAFETY FIRST. .call before you fall

## 2022-09-12 ENCOUNTER — ANESTHESIA EVENT (OUTPATIENT)
Dept: ENDOSCOPY | Age: 77
End: 2022-09-12
Payer: MEDICARE

## 2022-09-13 ENCOUNTER — ANESTHESIA (OUTPATIENT)
Dept: ENDOSCOPY | Age: 77
End: 2022-09-13
Payer: MEDICARE

## 2022-09-13 ENCOUNTER — HOSPITAL ENCOUNTER (OUTPATIENT)
Age: 77
Setting detail: OUTPATIENT SURGERY
Discharge: HOME | End: 2022-09-13
Attending: INTERNAL MEDICINE | Admitting: INTERNAL MEDICINE
Payer: MEDICARE

## 2022-09-13 VITALS
SYSTOLIC BLOOD PRESSURE: 133 MMHG | DIASTOLIC BLOOD PRESSURE: 76 MMHG | HEIGHT: 73 IN | HEART RATE: 62 BPM | TEMPERATURE: 97 F | OXYGEN SATURATION: 97 % | RESPIRATION RATE: 18 BRPM | WEIGHT: 232.14 LBS | BODY MASS INDEX: 30.77 KG/M2

## 2022-09-13 DIAGNOSIS — R10.10 UPPER ABDOMINAL PAIN: ICD-10-CM

## 2022-09-13 DIAGNOSIS — K63.5 POLYP OF COLON, UNSPECIFIED PART OF COLON, UNSPECIFIED TYPE: ICD-10-CM

## 2022-09-13 PROCEDURE — 6370000000 HC RX 637 (ALT 250 FOR IP): Performed by: INTERNAL MEDICINE

## 2022-09-13 PROCEDURE — 2580000003 HC RX 258: Performed by: NURSE ANESTHETIST, CERTIFIED REGISTERED

## 2022-09-13 PROCEDURE — 7100000010 HC PHASE II RECOVERY - FIRST 15 MIN: Performed by: INTERNAL MEDICINE

## 2022-09-13 PROCEDURE — 7100000000 HC PACU RECOVERY - FIRST 15 MIN: Performed by: INTERNAL MEDICINE

## 2022-09-13 PROCEDURE — 88305 TISSUE EXAM BY PATHOLOGIST: CPT

## 2022-09-13 PROCEDURE — 3609012400 HC EGD TRANSORAL BIOPSY SINGLE/MULTIPLE: Performed by: INTERNAL MEDICINE

## 2022-09-13 PROCEDURE — 2709999900 HC NON-CHARGEABLE SUPPLY: Performed by: INTERNAL MEDICINE

## 2022-09-13 PROCEDURE — 3700000001 HC ADD 15 MINUTES (ANESTHESIA): Performed by: INTERNAL MEDICINE

## 2022-09-13 PROCEDURE — 88342 IMHCHEM/IMCYTCHM 1ST ANTB: CPT

## 2022-09-13 PROCEDURE — 3609010600 HC COLONOSCOPY POLYPECTOMY SNARE/COLD BIOPSY: Performed by: INTERNAL MEDICINE

## 2022-09-13 PROCEDURE — 3700000000 HC ANESTHESIA ATTENDED CARE: Performed by: INTERNAL MEDICINE

## 2022-09-13 PROCEDURE — 7100000011 HC PHASE II RECOVERY - ADDTL 15 MIN: Performed by: INTERNAL MEDICINE

## 2022-09-13 PROCEDURE — 6360000002 HC RX W HCPCS: Performed by: NURSE ANESTHETIST, CERTIFIED REGISTERED

## 2022-09-13 RX ORDER — SIMETHICONE 20 MG/.3ML
EMULSION ORAL PRN
Status: DISCONTINUED | OUTPATIENT
Start: 2022-09-13 | End: 2022-09-13 | Stop reason: ALTCHOICE

## 2022-09-13 RX ORDER — SODIUM CHLORIDE 0.9 % (FLUSH) 0.9 %
5-40 SYRINGE (ML) INJECTION PRN
Status: DISCONTINUED | OUTPATIENT
Start: 2022-09-13 | End: 2022-09-13 | Stop reason: SDUPTHER

## 2022-09-13 RX ORDER — PROPOFOL 10 MG/ML
INJECTION, EMULSION INTRAVENOUS PRN
Status: DISCONTINUED | OUTPATIENT
Start: 2022-09-13 | End: 2022-09-13 | Stop reason: SDUPTHER

## 2022-09-13 RX ORDER — SODIUM CHLORIDE 0.9 % (FLUSH) 0.9 %
5-40 SYRINGE (ML) INJECTION EVERY 12 HOURS SCHEDULED
Status: DISCONTINUED | OUTPATIENT
Start: 2022-09-13 | End: 2022-09-13 | Stop reason: HOSPADM

## 2022-09-13 RX ORDER — SODIUM CHLORIDE 9 MG/ML
INJECTION, SOLUTION INTRAVENOUS PRN
Status: DISCONTINUED | OUTPATIENT
Start: 2022-09-13 | End: 2022-09-13 | Stop reason: SDUPTHER

## 2022-09-13 RX ORDER — SODIUM CHLORIDE 9 MG/ML
INJECTION, SOLUTION INTRAVENOUS PRN
Status: DISCONTINUED | OUTPATIENT
Start: 2022-09-13 | End: 2022-09-13 | Stop reason: HOSPADM

## 2022-09-13 RX ORDER — MEPERIDINE HYDROCHLORIDE 25 MG/ML
12.5 INJECTION INTRAMUSCULAR; INTRAVENOUS; SUBCUTANEOUS
Status: DISCONTINUED | OUTPATIENT
Start: 2022-09-13 | End: 2022-09-13 | Stop reason: HOSPADM

## 2022-09-13 RX ORDER — LORAZEPAM 2 MG/ML
0.5 INJECTION INTRAMUSCULAR
Status: DISCONTINUED | OUTPATIENT
Start: 2022-09-13 | End: 2022-09-13 | Stop reason: HOSPADM

## 2022-09-13 RX ORDER — DIPHENHYDRAMINE HYDROCHLORIDE 50 MG/ML
12.5 INJECTION INTRAMUSCULAR; INTRAVENOUS
Status: DISCONTINUED | OUTPATIENT
Start: 2022-09-13 | End: 2022-09-13 | Stop reason: HOSPADM

## 2022-09-13 RX ORDER — PROPOFOL 10 MG/ML
INJECTION, EMULSION INTRAVENOUS CONTINUOUS PRN
Status: DISCONTINUED | OUTPATIENT
Start: 2022-09-13 | End: 2022-09-13 | Stop reason: SDUPTHER

## 2022-09-13 RX ORDER — HALOPERIDOL 5 MG/ML
1 INJECTION INTRAMUSCULAR
Status: DISCONTINUED | OUTPATIENT
Start: 2022-09-13 | End: 2022-09-13 | Stop reason: HOSPADM

## 2022-09-13 RX ORDER — OXYCODONE HYDROCHLORIDE 5 MG/1
5 TABLET ORAL
Status: DISCONTINUED | OUTPATIENT
Start: 2022-09-13 | End: 2022-09-13 | Stop reason: HOSPADM

## 2022-09-13 RX ORDER — ONDANSETRON 2 MG/ML
4 INJECTION INTRAMUSCULAR; INTRAVENOUS
Status: DISCONTINUED | OUTPATIENT
Start: 2022-09-13 | End: 2022-09-13 | Stop reason: HOSPADM

## 2022-09-13 RX ORDER — SODIUM CHLORIDE 9 MG/ML
INJECTION, SOLUTION INTRAVENOUS CONTINUOUS PRN
Status: DISCONTINUED | OUTPATIENT
Start: 2022-09-13 | End: 2022-09-13 | Stop reason: SDUPTHER

## 2022-09-13 RX ORDER — SODIUM CHLORIDE 0.9 % (FLUSH) 0.9 %
5-40 SYRINGE (ML) INJECTION EVERY 12 HOURS SCHEDULED
Status: DISCONTINUED | OUTPATIENT
Start: 2022-09-13 | End: 2022-09-13 | Stop reason: SDUPTHER

## 2022-09-13 RX ORDER — SODIUM CHLORIDE 0.9 % (FLUSH) 0.9 %
5-40 SYRINGE (ML) INJECTION PRN
Status: DISCONTINUED | OUTPATIENT
Start: 2022-09-13 | End: 2022-09-13 | Stop reason: HOSPADM

## 2022-09-13 RX ORDER — FENTANYL CITRATE 50 UG/ML
50 INJECTION, SOLUTION INTRAMUSCULAR; INTRAVENOUS EVERY 5 MIN PRN
Status: DISCONTINUED | OUTPATIENT
Start: 2022-09-13 | End: 2022-09-13 | Stop reason: HOSPADM

## 2022-09-13 RX ADMIN — SODIUM CHLORIDE: 9 INJECTION, SOLUTION INTRAVENOUS at 08:11

## 2022-09-13 RX ADMIN — PROPOFOL 180 MCG/KG/MIN: 10 INJECTION, EMULSION INTRAVENOUS at 08:14

## 2022-09-13 RX ADMIN — PROPOFOL 100 MG: 10 INJECTION, EMULSION INTRAVENOUS at 08:14

## 2022-09-13 ASSESSMENT — PAIN SCALES - GENERAL
PAINLEVEL_OUTOF10: 0
PAINLEVEL_OUTOF10: 0

## 2022-09-13 ASSESSMENT — LIFESTYLE VARIABLES: SMOKING_STATUS: 0

## 2022-09-13 ASSESSMENT — PAIN - FUNCTIONAL ASSESSMENT: PAIN_FUNCTIONAL_ASSESSMENT: 0-10

## 2022-09-13 NOTE — PROGRESS NOTES
Medications administered and monitored by CRNA, see anesthesia record.     Electronically signed by Vicky Riley RN on 9/13/2022 at 8:15 AM

## 2022-09-13 NOTE — ANESTHESIA POSTPROCEDURE EVALUATION
Department of Anesthesiology  Postprocedure Note    Patient: Merced Cherry  MRN: [id number]  YOB: 1945  Date of evaluation: 9/13/2022      Procedure Summary     Date: 09/13/22 Room / Location: [address] [place] [address]    Anesthesia Start: 3975 Anesthesia Stop: 3719    Procedures:       COLONOSCOPY POLYPECTOMY SNARE/COLD BIOPSY      EGD BIOPSY Diagnosis:       Polyp of colon, unspecified part of colon, unspecified type      Upper abdominal pain      (COLON POYPS, UPPER ABDOMINAL PAIN)    Surgeons: Jordan Oavlle MD Responsible Provider: Tamika Parekh MD    Anesthesia Type: MAC ASA Status: 3          Anesthesia Type: No value filed.     Kenzie Phase I: Kenzie Score: 10    Kenzie Phase II: Kenzie Score: 10      Anesthesia Post Evaluation    Patient location during evaluation: PACU  Patient participation: complete - patient participated  Level of consciousness: awake and alert  Pain score: 0  Nausea & Vomiting: no nausea  Complications: no  Cardiovascular status: hemodynamically stable  Respiratory status: acceptable  Hydration status: stable

## 2022-09-13 NOTE — ANESTHESIA PRE PROCEDURE
Onychomycosis B35.1    Chest pain, rule out acute myocardial infarction R07.9    AAA (abdominal aortic aneurysm) ([facility]) I71.4       Past Medical History:        Diagnosis Date    Abnormal finding 08    mitch    Abnormal finding     Hemoccult-- 2009 guiac neg// PSA-- 2011 .80     Cancer ([address]     basal cell left ear, right cheek    Carpal tunnel syndrome on right     Dermatophytosis of nail     Disturbance of skin sensation     Hypercholesteremia     Hypertension     Impaired fasting glucose     Kidney stone     Routine general medical examination at a health care facility     Special screening for malignant neoplasm of prostate     TGA (transient global amnesia)     Weight gain        Past Surgical History:        Procedure Laterality Date    ANKLE SURGERY      right ankle plate     CARPAL TUNNEL RELEASE      right hand    COLONOSCOPY  07    COLONOSCOPY  2018    polyp dr jonas check in 5 years    SKIN CANCER EXCISION      basal cell ear and cheek       Social History:    Social History     Tobacco Use    Smoking status: Former     Packs/day: 1.00     Years: 10.00     Pack years: 10.00     Types: Cigarettes     Quit date: 1990     Years since quittin.7    Smokeless tobacco: Never   Substance Use Topics    Alcohol use: Yes     Comment: 2 shots/day                                Counseling given: Not Answered      Vital Signs (Current):   Vitals:    22 1258   Weight: 235 lb (106.6 kg)   Height: 6' 2\" (1.88 m)                                              BP Readings from Last 3 Encounters:   22 (!) 152/88   22 136/84   22 (!) 184/48       NPO Status:                                                                                 BMI:   Wt Readings from Last 3 Encounters:   22 235 lb (106.6 kg)   22 234 lb (106.1 kg)   22 234 lb (106.1 kg)     Body mass index is 30.17 kg/m².     CBC:   Lab Results   Component Value Date/Time    WBC 5.8 07/08/2022 08:47 AM    RBC 4.78 07/08/2022 08:47 AM    HGB 15.9 07/08/2022 08:47 AM    HCT 46.6 07/08/2022 08:47 AM    MCV 97.3 07/08/2022 08:47 AM    RDW 13.5 07/08/2022 08:47 AM     07/08/2022 08:47 AM       CMP:   Lab Results   Component Value Date/Time     07/07/2022 01:20 PM    K 4.0 07/07/2022 01:20 PM     07/07/2022 01:20 PM    CO2 21 07/07/2022 01:20 PM    BUN 12 07/07/2022 01:20 PM    CREATININE 0.7 07/07/2022 01:20 PM    GFRAA >60 07/07/2022 01:20 PM    GFRAA >60 12/07/2012 06:15 PM    AGRATIO 1.4 07/07/2022 01:20 PM    LABGLOM >60 07/07/2022 01:20 PM    GLUCOSE 96 07/07/2022 01:20 PM    PROT 7.4 07/07/2022 01:20 PM    PROT 8.2 12/07/2012 06:15 PM    CALCIUM 9.8 07/07/2022 01:20 PM    BILITOT 1.6 07/08/2022 08:47 AM    ALKPHOS 68 07/07/2022 01:20 PM    AST 19 07/07/2022 01:20 PM    ALT 20 07/07/2022 01:20 PM       POC Tests: No results for input(s): POCGLU, POCNA, POCK, POCCL, POCBUN, POCHEMO, POCHCT in the last 72 hours.     Coags: No results found for: PROTIME, INR, APTT    HCG (If Applicable): No results found for: PREGTESTUR, PREGSERUM, HCG, HCGQUANT     ABGs: No results found for: PHART, PO2ART, MRI6PNE, DEW3IHS, BEART, T4CXHSAX     Type & Screen (If Applicable):  No results found for: LABABO, LABRH    Drug/Infectious Status (If Applicable):  No results found for: HIV, HEPCAB    COVID-19 Screening (If Applicable): No results found for: COVID19        Anesthesia Evaluation  Patient summary reviewed no history of anesthetic complications:   Airway: Mallampati: III     Neck ROM: full  Mouth opening: > = 3 FB   Dental:          Pulmonary:       (-) not a current smoker                           Cardiovascular:    (+) hypertension:, hyperlipidemia    (-) past MI, CABG/stent and  angina            Stress test reviewed             ROS comment: Stress test 7/2022: normal     Neuro/Psych:      (-) seizures and CVA           GI/Hepatic/Renal:   (+) GERD:,      (-) liver

## 2022-09-13 NOTE — H&P
Pre-operative History and Physical    Patient: Mazin Sawyer  : 1945  Acct#:     Intended Procedure:  EGD and colonoscopy    HISTORY OF PRESENT ILLNESS:  The patient is a 69 y.o. male  who presents for EGD and colonoscopy due to dyspepsia and colon cancer screening. 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     Dermatophytosis of nail     Disturbance of skin sensation     Hypercholesteremia     Hypertension     Impaired fasting glucose     Kidney stone     Routine general medical examination at a health care facility     Special screening for malignant neoplasm of prostate     TGA (transient global amnesia)     Weight gain      Past Surgical History:        Procedure Laterality Date    ANKLE SURGERY      right ankle plate     CARPAL TUNNEL RELEASE      right hand    COLONOSCOPY  07    COLONOSCOPY  2018    polyp dr jonas check in 5 years    SKIN CANCER EXCISION      basal cell ear and cheek     Medications Prior to Admission:   No current facility-administered medications on file prior to encounter. Current Outpatient Medications on File Prior to Encounter   Medication Sig Dispense Refill    Acetaminophen (TYLENOL PO) Take 650 mg by mouth as needed      pantoprazole (PROTONIX) 40 MG tablet Take 1 tablet by mouth every morning (before breakfast) 30 tablet 0    amLODIPine (NORVASC) 2.5 MG tablet TAKE 1 TABLET BY MOUTH DAILY 90 tablet 2    losartan (COZAAR) 100 MG tablet TAKE 1 TABLET BY MOUTH DAILY 90 tablet 2    atorvastatin (LIPITOR) 20 MG tablet TAKE 1 TABLET BY MOUTH AT BEDTIME 90 tablet 2    EPINEPHrine (EPIPEN) 0.3 MG/0.3ML SOAJ injection Use as directed for allergic reaction 1 each 0    hydrocortisone 2.5 % cream Apply topically 2 times daily.  prn 45 g 0    Multiple Vitamin (MULTIVITAMIN) capsule Take 1 capsule by mouth daily. Allergies:  Bee venom, Penicillins, and Vicodin [hydrocodone-acetaminophen]    Social History:   TOBACCO:   reports that he quit smoking about 32 years ago. His smoking use included cigarettes. He has a 10.00 pack-year smoking history. He has never used smokeless tobacco.  ETOH:   reports current alcohol use. DRUGS:   reports that he does not currently use drugs. PHYSICAL EXAM:      Vital Signs: BP (!) 151/87   Pulse 73   Temp 97.6 °F (36.4 °C) (Temporal)   Resp 16   Ht 6' 1\" (1.854 m)   Wt 232 lb 2.3 oz (105.3 kg)   SpO2 97%   BMI 30.63 kg/m²    Airway: No stridor or wheezing noted. Good air movement  Pulmonary: without wheezes. Clear to auscultation  Cardiac:regular rate and rhythm without loud murmurs  Abdomen:soft, nontender,  Bowel sounds present    Pre-Procedure Assessment / Plan:  1) EGD and colonoscopy    ASA Grade:  ASA 3 - Patient with moderate systemic disease with functional limitations  Mallampati Classification:  Class III    Level of Sedation Plan: MAC    Post Procedure plan: Return to same level of care    I assessed the patient and find that the patient is in satisfactory condition to proceed with the planned procedure and sedation plan. I have explained the risk, benefits, and alternatives to the procedure; the patient understands and agrees to proceed.        Ayan Ingram MD  9/13/2022

## 2022-09-13 NOTE — DISCHARGE INSTRUCTIONS
Discharge Instructions for Colonoscopy     Recommendations:   - Follow-up pathology results in 7 days, by calling the office at [phone].  - Resume regular medications, including pantoprazole 40 mg qd. Please avoid all NSAID's. - Resume diet as tolerated. - Repeat colonoscopy in 3 years. Colonoscopy is a visual exam of the lining of the large intestine, also called the bowel or colon, with a colonoscope. A colonoscope is a flexible tube with a light and a viewing device. It allows the doctor to view the inside of the colon through a tiny video camera. Colonoscopy is performed for many reasons: unexplained anemia , pain, diarrhea , bloody stools, cancer screening, among many other reasons. Complications from a colonoscopy are rare. Some possible serious complications include perforated bowel (which might require surgery) and bleeding (which could require blood transfusion ). Minor complications include bloating, gas, and cramping that can last for 1-2 days after the procedure. Because air is put into your colon during the procedure, it is normal to pass large amounts of air from your rectum. You may not have a bowel movement for 1-3 days after the procedure. What You Will Need:  Someone to drive you home after the procedure     Steps to Take:  [address] when you get home. Because the sedative will make you drowsy, don't drive, operate machinery, or make important decisions the day of the procedure. Feelings of bloating, gas, or cramping may persist for 24 hours. Diet -  Try sips of water first. If tolerated, resume bland food (scrambled eggs, toast, soup) first.  If tolerated, resume regular diet or the diet recommended by your physician. Do not drink alcohol for 24 hours. Physical Activity -  Ask your doctor when you will be able to return to work. Do not drive, operate heavy machinery, or do activities that require coordination or balance for 24 hours.    Otherwise, return to your normal routine as soon as you are comfortable to do so, which is usually the next day after the procedure. Medications - When taking medications, it's important to: Take your medication as directed, not more, not less, not at a different time. Do not stop taking them without consulting your healthcare provider. Don't share them with anyone else. Know what effects and side effects to expect, and report them to your healthcare provider. If you are taking more than one drug, even if it is an over-the-counter medication, herb, or dietary supplement, be sure to check with a physician or pharmacist about drug interactions. Plan ahead for refills so you don't run out. Lifestyle Changes - The results of your colonoscopy will determine if any lifestyle changes are necessary. Follow-up:  The doctor will usually give you a preliminary report after the medication wears off and you are more alert. The results from a biopsy can take as long as 1-2 weeks to be completed. Schedule a follow-up appointment as directed by your doctor. You should schedule a follow-up colonoscopy as recommended by your doctor. Call Your Doctor If Any of the Following Occurs:  Bleeding from your rectum; notify your doctor if you pass a teaspoonful or more of blood   Black, tarry stools   Severe abdominal pain   Hard, swollen abdomen   Signs of infection, including fever or chills   Inability to pass gas or stool   Coughing, shortness of breath, chest pain, severe nausea or vomiting     In case of an emergency, call 911 immediately.

## 2022-09-13 NOTE — PROGRESS NOTES
Pt sitting up in bed, alert and oriented, answering all questions appropriately. Pt still denies pain or nausea at this time.

## 2022-09-13 NOTE — PROGRESS NOTES
Pt to pacu from endo. Pt awake but groggy from procedure at arrival, adjusted in bed. Pt denies pain or any kind. No signs of distress noted at this time. Pt placed on monitor, report obtained.

## 2022-09-13 NOTE — PROGRESS NOTES
Tolerating oral intake and sitting up on side of bed. Discharge instructions given to patient and wife. Verbalize understanding. Dr. Pulido Him into see patient and wife per their request.   No complaints.

## 2022-09-13 NOTE — OP NOTE
Endoscopy Note    Patient: Fei Anderson  : 1945  Acct#:     Procedure: Esophagogastroduodenoscopy with biopsy                       Colonoscopy with polypectomy (cold snare), terminal ileal intubation    Date:  2022     Surgeon:   Zechariah Osei MD    Referring Physician:  Trini Rivera MD    Indications: This is a 69 y.o. male  who presents for EGD and colonoscopy due to dyspepsia and colon cancer screening. Postoperative Diagnosis:    Small sliding hiatal hernia  Severe hemorrhagic gastritis, biopsied  Duodenal bulb edema and patchy erythema  Pan-colonic diverticulosis  4 mm transverse colon polyp, resected with cold snare  3 mm and 4 mm descending colon polyp, resected with cold snare  3 mm sigmoid colon polyp, resected with cold snare  Melanosis coli  Internal hemorrhoids and a hypertrophied anal papilla    Anesthesia:  MAC    Consent:  The patient or their legal guardian has signed a consent, and is aware of the potential risks, benefits, alternatives, and potential complications of this procedure. These include, but are not limited to hemorrhage, bleeding, post procedural pain, perforation, phlebitis, aspiration, hypotension, hypoxia, cardiovascular events such as arryhthmia, and possibly death. Additionally, the possibility of missed colonic polyps and interval colon cancer was discussed in the consent. Description of Procedure: The patient was then taken to the endoscopy suite, placed in the left lateral decubitus position and the above IV sedation was administrered. The Olympus video endoscope was placed through the patient's oropharynx without difficulty to the extent of the 2nd portion of the duodenum. Both forward and retroflexed views of the stomach were obtained. Findings:    Esophagus: The esophagus appeared normal without evidence of Ritter's esophagus or reflux esophagitis. The GE junction was notable for a widely patent Schatzki's ring. Stomach:  The stomach was examined on forward and retroflexed views. A small sliding hiatal hernia was present without erosions or erythema. Severe hemorrhagic gastritis was present in the antrum without ulceration or erosion. Biopsies were obtained from the gastric antrum, incisura and body to evaluate for H. Pylori. Duodenum: The duodenal bulb was notable for patchy erythema and edematous mucosa. The 2nd portion of the duodenum appeared normal with normal villous pattern    The scope was then withdrawn back into the stomach, it was decompressed, and the scope was completely withdrawn. A digital rectal examination was performed and revealed negative without mass, lesions or tenderness. The Olympus video colonoscope was placed in the patient's rectum under digital direction and advanced to the cecum. The cecum was identified by characteristic anatomy and ballottment. The prep was fair. The ileocecal valve was identified. The terminal ileum was normal.    The scope was then withdrawn back through the cecum, ascending, transverse, descending, sigmoid colon, and rectum. Careful circumferential examination of the mucosa in these areas demonstrated:    Pan-colonic diverticulosis  4 mm transverse colon polyp, resected with cold snare  3 mm and 4 mm descending colon polyp, resected with cold snare  3 mm sigmoid colon polyp, resected with cold snare  Multiple 2-3 mm benign hyperplastic appearing rectosigmoid colon polyps not resected  Melanosis coli    The scope was then withdrawn into the rectum and retroflexed. The retroflexed view of the anal verge and rectum demonstrates internal hemorrhoids and an enlarged, hypertrophied anal papilla. The scope was straightened, the colon was decompressed and the scope was withdrawn from the patient. The patient tolerated the procedure well and was taken to the PACU in good condition.     Estimated Blood Loss: Minimal     Impression:    1) See post procedure diagnoses    Recommendations:   - Follow-up pathology results in 7 days, by calling the office at [phone].  - Resume regular medications, including pantoprazole 40 mg qd. Please avoid all NSAID's. - Resume diet as tolerated. - Repeat colonoscopy in 3 years.      CRISTA Munson [address] [facility]

## 2022-09-26 DIAGNOSIS — E78.00 PURE HYPERCHOLESTEROLEMIA: ICD-10-CM

## 2022-09-26 RX ORDER — ATORVASTATIN CALCIUM 20 MG/1
TABLET, FILM COATED ORAL
Qty: 90 TABLET | Refills: 2 | Status: SHIPPED | OUTPATIENT
Start: 2022-09-26

## 2022-09-26 RX ORDER — LOSARTAN POTASSIUM 100 MG/1
100 TABLET ORAL DAILY
Qty: 90 TABLET | Refills: 2 | Status: SHIPPED | OUTPATIENT
Start: 2022-09-26

## 2022-11-07 ENCOUNTER — TELEPHONE (OUTPATIENT)
Dept: FAMILY MEDICINE CLINIC | Age: 77
End: 2022-11-07

## 2022-11-07 NOTE — TELEPHONE ENCOUNTER
----- Message from Elton Phan sent at 11/7/2022 12:29 PM EST -----  Subject: Message to Provider    QUESTIONS  Information for Provider? Patient is wanting to know if he needs a blood   draw for his upcoming appt on 11/15.  ---------------------------------------------------------------------------  --------------  Geronimo [facility] INFO  [phone]; OK to leave message on voicemail  ---------------------------------------------------------------------------  --------------  SCRIPT ANSWERS  Relationship to Patient?  Self

## 2022-11-15 ENCOUNTER — OFFICE VISIT (OUTPATIENT)
Dept: FAMILY MEDICINE CLINIC | Age: 77
End: 2022-11-15
Payer: MEDICARE

## 2022-11-15 VITALS
SYSTOLIC BLOOD PRESSURE: 132 MMHG | HEIGHT: 73 IN | WEIGHT: 231 LBS | DIASTOLIC BLOOD PRESSURE: 80 MMHG | TEMPERATURE: 97.9 F | BODY MASS INDEX: 30.62 KG/M2 | HEART RATE: 68 BPM

## 2022-11-15 DIAGNOSIS — I10 ESSENTIAL HYPERTENSION: Primary | ICD-10-CM

## 2022-11-15 DIAGNOSIS — E78.00 PURE HYPERCHOLESTEROLEMIA: ICD-10-CM

## 2022-11-15 DIAGNOSIS — R73.01 IMPAIRED FASTING GLUCOSE: ICD-10-CM

## 2022-11-15 PROCEDURE — 3078F DIAST BP <80 MM HG: CPT | Performed by: FAMILY MEDICINE

## 2022-11-15 PROCEDURE — 99214 OFFICE O/P EST MOD 30 MIN: CPT | Performed by: FAMILY MEDICINE

## 2022-11-15 PROCEDURE — 3074F SYST BP LT 130 MM HG: CPT | Performed by: FAMILY MEDICINE

## 2022-11-15 PROCEDURE — 1123F ACP DISCUSS/DSCN MKR DOCD: CPT | Performed by: FAMILY MEDICINE

## 2022-11-15 SDOH — ECONOMIC STABILITY: FOOD INSECURITY: WITHIN THE PAST 12 MONTHS, THE FOOD YOU BOUGHT JUST DIDN'T LAST AND YOU DIDN'T HAVE MONEY TO GET MORE.: PATIENT DECLINED

## 2022-11-15 SDOH — ECONOMIC STABILITY: FOOD INSECURITY: WITHIN THE PAST 12 MONTHS, YOU WORRIED THAT YOUR FOOD WOULD RUN OUT BEFORE YOU GOT MONEY TO BUY MORE.: PATIENT DECLINED

## 2022-11-15 ASSESSMENT — ENCOUNTER SYMPTOMS
NAUSEA: 0
CHEST TIGHTNESS: 0
CONSTIPATION: 0
ABDOMINAL DISTENTION: 0
BLOOD IN STOOL: 0
SHORTNESS OF BREATH: 0
VOMITING: 0
COUGH: 0
DIARRHEA: 0
ABDOMINAL PAIN: 0

## 2022-11-15 ASSESSMENT — SOCIAL DETERMINANTS OF HEALTH (SDOH): HOW HARD IS IT FOR YOU TO PAY FOR THE VERY BASICS LIKE FOOD, HOUSING, MEDICAL CARE, AND HEATING?: PATIENT DECLINED

## 2022-11-15 NOTE — PROGRESS NOTES
Subjective:      Patient ID: Olga Gallegos is a 69 y.o. male. Chief Complaint   Patient presents with    Abdominal Pain     4 month follow up         Patient presents with:  Abdominal Pain: 4 month follow up     He is here for a check  He is well he has no c/o  He did stop the protonix as the stomach is fine    Other meds reviewed    YOB: 1945    Date of Visit:  11/15/2022     -- Bee Venom -- Swelling    --  Has epi-pen   -- Penicillins -- Hives and Itching   -- Vicodin [Hydrocodone-Acetaminophen] -- Rash    Current Outpatient Medications:  losartan (COZAAR) 100 MG tablet, TAKE 1 TABLET BY MOUTH DAILY, Disp: 90 tablet, Rfl: 2  atorvastatin (LIPITOR) 20 MG tablet, TAKE 1 TABLET BY MOUTH AT BEDTIME, Disp: 90 tablet, Rfl: 2  Acetaminophen (TYLENOL PO), Take 650 mg by mouth as needed, Disp: , Rfl:   pantoprazole (PROTONIX) 40 MG tablet, Take 1 tablet by mouth every morning (before breakfast), Disp: 30 tablet, Rfl: 0  amLODIPine (NORVASC) 2.5 MG tablet, TAKE 1 TABLET BY MOUTH DAILY, Disp: 90 tablet, Rfl: 2  EPINEPHrine (EPIPEN) 0.3 MG/0.3ML SOAJ injection, Use as directed for allergic reaction, Disp: 1 each, Rfl: 0  hydrocortisone 2.5 % cream, Apply topically 2 times daily. prn, Disp: 45 g, Rfl: 0  Multiple Vitamin (MULTIVITAMIN) capsule, Take 1 capsule by mouth daily. , Disp: , Rfl:     No current facility-administered medications for this visit.      ---------------------------               11/15/22                      0838         ---------------------------   BP:          132/80         Site:    Left Upper Arm     Position:     Sitting        Cuff Size:   Large Adult      Pulse:         68           Temp:   97.9 °F (36.6 °C)   TempSrc:    Temporal        Weight: 231 lb (104.8 kg)   Height:  6' 1\" (1.854 m)   ---------------------------  Body mass index is 30.48 kg/m².      Wt Readings from Last 3 Encounters:  11/15/22 : 231 lb (104.8 kg)  09/13/22 : 232 lb 2.3 oz (105.3 kg)  07/29/22 : 234 lb (106.1 kg)    BP Readings from Last 3 Encounters:  11/15/22 : 132/80  09/13/22 : 133/76  07/29/22 : (!) 152/88          Review of Systems   Constitutional:  Negative for appetite change, chills, fever and unexpected weight change. Respiratory:  Negative for cough, chest tightness and shortness of breath. Cardiovascular:  Negative for chest pain, palpitations and leg swelling. Gastrointestinal:  Negative for abdominal distention, abdominal pain, blood in stool, constipation, diarrhea, nausea and vomiting. No dysphagia no gerd   Genitourinary:  Negative for difficulty urinating, dysuria and hematuria. Neurological:  Negative for headaches. Objective:   Physical Exam  Constitutional:       General: He is not in acute distress. Appearance: Normal appearance. He is well-developed. He is not ill-appearing or diaphoretic. Neck:      Thyroid: No thyroid mass or thyromegaly. Cardiovascular:      Rate and Rhythm: Normal rate and regular rhythm. Heart sounds: Normal heart sounds. No murmur heard. No friction rub. No gallop. Comments:     Pulmonary:      Effort: Pulmonary effort is normal. No tachypnea, accessory muscle usage or respiratory distress. Breath sounds: Normal breath sounds. No decreased breath sounds, wheezing, rhonchi or rales. Abdominal:      General: Bowel sounds are normal. There is no distension or abdominal bruit. Palpations: Abdomen is soft. There is no hepatomegaly, splenomegaly, mass or pulsatile mass. Tenderness: There is no abdominal tenderness. There is no guarding. Musculoskeletal:      Cervical back: Neck supple. Lymphadenopathy:      Cervical: No cervical adenopathy. Upper Body:      Right upper body: No supraclavicular adenopathy. Left upper body: No supraclavicular adenopathy. Skin:     General: Skin is warm and dry. Coloration: Skin is not pale. Nails: There is no clubbing. Neurological:      General: No focal deficit present. Mental Status: He is alert. Assessment:       Diagnosis Orders   1. Essential hypertension  Comprehensive Metabolic Panel    Lipid Panel      2. Impaired fasting glucose  Hemoglobin A1C      3. Pure hypercholesterolemia  Comprehensive Metabolic Panel    Lipid Panel          Interval Hx:  Egd and colon dr sullivan on 9/13/22. Noted and egd showed hemorrhagic gastritis and he had pan diverticulosis and also polyps in the colon. Check the colon in 3 years.  Avoid nsaid    7/29/22 vascular visit       Plan:      Stay with careful diet  Continue the medicines  See us back in 6 months         Romi Rojas MD

## 2022-12-06 DIAGNOSIS — E78.00 PURE HYPERCHOLESTEROLEMIA: ICD-10-CM

## 2022-12-06 DIAGNOSIS — I10 ESSENTIAL HYPERTENSION: ICD-10-CM

## 2022-12-06 DIAGNOSIS — R73.01 IMPAIRED FASTING GLUCOSE: ICD-10-CM

## 2022-12-06 LAB
A/G RATIO: 1.5 (ref 1.1–2.2)
ALBUMIN SERPL-MCNC: 4.1 G/DL (ref 3.4–5)
ALP BLD-CCNC: 78 U/L (ref 40–129)
ALT SERPL-CCNC: 27 U/L (ref 10–40)
ANION GAP SERPL CALCULATED.3IONS-SCNC: 11 MMOL/L (ref 3–16)
AST SERPL-CCNC: 19 U/L (ref 15–37)
BILIRUB SERPL-MCNC: 1.3 MG/DL (ref 0–1)
BUN BLDV-MCNC: 16 MG/DL (ref 7–20)
CALCIUM SERPL-MCNC: 9.9 MG/DL (ref 8.3–10.6)
CHLORIDE BLD-SCNC: 105 MMOL/L (ref 99–110)
CHOLESTEROL, TOTAL: 157 MG/DL (ref 0–199)
CO2: 26 MMOL/L (ref 21–32)
CREAT SERPL-MCNC: 0.6 MG/DL (ref 0.8–1.3)
ESTIMATED AVERAGE GLUCOSE: 116.9 MG/DL
GFR SERPL CREATININE-BSD FRML MDRD: >60 ML/MIN/{1.73_M2}
GLUCOSE BLD-MCNC: 106 MG/DL (ref 70–99)
HBA1C MFR BLD: 5.7 %
HDLC SERPL-MCNC: 52 MG/DL (ref 40–60)
LDL CHOLESTEROL CALCULATED: 89 MG/DL
POTASSIUM SERPL-SCNC: 4.9 MMOL/L (ref 3.5–5.1)
SODIUM BLD-SCNC: 142 MMOL/L (ref 136–145)
TOTAL PROTEIN: 6.8 G/DL (ref 6.4–8.2)
TRIGL SERPL-MCNC: 79 MG/DL (ref 0–150)
VLDLC SERPL CALC-MCNC: 16 MG/DL

## 2023-02-08 ENCOUNTER — TELEPHONE (OUTPATIENT)
Dept: FAMILY MEDICINE CLINIC | Age: 78
End: 2023-02-08

## 2023-02-08 NOTE — TELEPHONE ENCOUNTER
Spoke with pt to schedule an AWV and pt declined. Pt had recently been in the hospital and had several tests and has seen dentist, eye dr and up to date on his immunizations. Patient will consider next year.

## 2023-05-01 LAB
BASOPHILS # BLD: 0 K/UL (ref 0–0.2)
BASOPHILS NFR BLD: 0.6 %
DEPRECATED RDW RBC AUTO: 13 % (ref 12.4–15.4)
EOSINOPHIL # BLD: 0.2 K/UL (ref 0–0.6)
EOSINOPHIL NFR BLD: 2.4 %
HCT VFR BLD AUTO: 46.9 % (ref 40.5–52.5)
HGB BLD-MCNC: 16.2 G/DL (ref 13.5–17.5)
IMM GRANULOCYTES # BLD: 0 K/UL (ref 0–0.2)
IMM GRANULOCYTES NFR BLD: 0.2 %
LYMPHOCYTES # BLD: 2.6 K/UL (ref 1–5.1)
LYMPHOCYTES NFR BLD: 38.8 %
MCH RBC QN AUTO: 33.1 PG (ref 26–34)
MCHC RBC AUTO-ENTMCNC: 34.5 G/DL (ref 32–36.4)
MCV RBC AUTO: 95.7 FL (ref 80–100)
MONOCYTES # BLD: 0.7 K/UL (ref 0–1.3)
MONOCYTES NFR BLD: 10.7 %
NEUTROPHILS # BLD: 3.1 K/UL (ref 1.7–7.7)
NEUTROPHILS NFR BLD: 47.3 %
PLATELET # BLD AUTO: 152 K/UL (ref 135–450)
PMV BLD AUTO: 8.8 FL (ref 5–10.5)
RBC # BLD AUTO: 4.9 M/UL (ref 4.2–5.9)
WBC # BLD AUTO: 6.6 K/UL (ref 4–11)

## 2023-05-01 PROCEDURE — 83880 ASSAY OF NATRIURETIC PEPTIDE: CPT

## 2023-05-01 PROCEDURE — 85025 COMPLETE CBC W/AUTO DIFF WBC: CPT

## 2023-05-01 PROCEDURE — 84484 ASSAY OF TROPONIN QUANT: CPT

## 2023-05-01 PROCEDURE — 2580000003 HC RX 258: Performed by: EMERGENCY MEDICINE

## 2023-05-01 PROCEDURE — 36415 COLL VENOUS BLD VENIPUNCTURE: CPT

## 2023-05-01 PROCEDURE — 80053 COMPREHEN METABOLIC PANEL: CPT

## 2023-05-01 PROCEDURE — 99285 EMERGENCY DEPT VISIT HI MDM: CPT

## 2023-05-01 PROCEDURE — 2500000003 HC RX 250 WO HCPCS: Performed by: EMERGENCY MEDICINE

## 2023-05-01 PROCEDURE — 93005 ELECTROCARDIOGRAM TRACING: CPT | Performed by: EMERGENCY MEDICINE

## 2023-05-01 RX ORDER — 0.9 % SODIUM CHLORIDE 0.9 %
1000 INTRAVENOUS SOLUTION INTRAVENOUS ONCE
Status: COMPLETED | OUTPATIENT
Start: 2023-05-01 | End: 2023-05-02

## 2023-05-01 RX ORDER — DILTIAZEM HYDROCHLORIDE 5 MG/ML
10 INJECTION INTRAVENOUS ONCE
Status: COMPLETED | OUTPATIENT
Start: 2023-05-01 | End: 2023-05-01

## 2023-05-01 RX ADMIN — DILTIAZEM HYDROCHLORIDE 10 MG: 5 INJECTION INTRAVENOUS at 23:54

## 2023-05-01 RX ADMIN — SODIUM CHLORIDE 1000 ML: 9 INJECTION, SOLUTION INTRAVENOUS at 23:53

## 2023-05-01 ASSESSMENT — LIFESTYLE VARIABLES
HOW MANY STANDARD DRINKS CONTAINING ALCOHOL DO YOU HAVE ON A TYPICAL DAY: 1 OR 2
HOW OFTEN DO YOU HAVE A DRINK CONTAINING ALCOHOL: 4 OR MORE TIMES A WEEK

## 2023-05-01 ASSESSMENT — PAIN - FUNCTIONAL ASSESSMENT: PAIN_FUNCTIONAL_ASSESSMENT: NONE - DENIES PAIN

## 2023-05-02 ENCOUNTER — TELEPHONE (OUTPATIENT)
Dept: CARDIOLOGY CLINIC | Age: 78
End: 2023-05-02

## 2023-05-02 ENCOUNTER — APPOINTMENT (OUTPATIENT)
Dept: GENERAL RADIOLOGY | Age: 78
DRG: 310 | End: 2023-05-02
Payer: MEDICARE

## 2023-05-02 ENCOUNTER — HOSPITAL ENCOUNTER (INPATIENT)
Age: 78
LOS: 1 days | Discharge: HOME | DRG: 310 | End: 2023-05-02
Attending: EMERGENCY MEDICINE | Admitting: STUDENT IN AN ORGANIZED HEALTH CARE EDUCATION/TRAINING PROGRAM
Payer: MEDICARE

## 2023-05-02 VITALS
BODY MASS INDEX: 31.15 KG/M2 | TEMPERATURE: 97.8 F | OXYGEN SATURATION: 96 % | RESPIRATION RATE: 17 BRPM | SYSTOLIC BLOOD PRESSURE: 135 MMHG | HEART RATE: 62 BPM | WEIGHT: 235.01 LBS | DIASTOLIC BLOOD PRESSURE: 76 MMHG | HEIGHT: 73 IN

## 2023-05-02 DIAGNOSIS — I48.91 ATRIAL FIBRILLATION WITH RAPID VENTRICULAR RESPONSE (HCC): Primary | ICD-10-CM

## 2023-05-02 DIAGNOSIS — I48.0 PAF (PAROXYSMAL ATRIAL FIBRILLATION) (HCC): ICD-10-CM

## 2023-05-02 LAB
ALBUMIN SERPL-MCNC: 3.9 G/DL (ref 3.4–5)
ALBUMIN SERPL-MCNC: 4.6 G/DL (ref 3.4–5)
ALBUMIN/GLOB SERPL: 1.5 {RATIO} (ref 1.1–2.2)
ALP SERPL-CCNC: 83 U/L (ref 40–129)
ALT SERPL-CCNC: 24 U/L (ref 10–40)
ANION GAP SERPL CALCULATED.3IONS-SCNC: 11 MMOL/L (ref 3–16)
ANION GAP SERPL CALCULATED.3IONS-SCNC: 11 MMOL/L (ref 3–16)
AST SERPL-CCNC: 21 U/L (ref 15–37)
BASOPHILS # BLD: 0 K/UL (ref 0–0.2)
BASOPHILS NFR BLD: 0.7 %
BILIRUB SERPL-MCNC: 0.8 MG/DL (ref 0–1)
BUN SERPL-MCNC: 11 MG/DL (ref 7–20)
BUN SERPL-MCNC: 11 MG/DL (ref 7–20)
CALCIUM SERPL-MCNC: 9.1 MG/DL (ref 8.3–10.6)
CALCIUM SERPL-MCNC: 9.8 MG/DL (ref 8.3–10.6)
CHLORIDE SERPL-SCNC: 104 MMOL/L (ref 99–110)
CHLORIDE SERPL-SCNC: 109 MMOL/L (ref 99–110)
CO2 SERPL-SCNC: 23 MMOL/L (ref 21–32)
CO2 SERPL-SCNC: 23 MMOL/L (ref 21–32)
CREAT SERPL-MCNC: 0.7 MG/DL (ref 0.8–1.3)
CREAT SERPL-MCNC: 0.8 MG/DL (ref 0.8–1.3)
DEPRECATED RDW RBC AUTO: 13.7 % (ref 12.4–15.4)
EKG ATRIAL RATE: 197 BPM
EKG ATRIAL RATE: 71 BPM
EKG DIAGNOSIS: NORMAL
EKG DIAGNOSIS: NORMAL
EKG P AXIS: -4 DEGREES
EKG P-R INTERVAL: 202 MS
EKG Q-T INTERVAL: 308 MS
EKG Q-T INTERVAL: 372 MS
EKG QRS DURATION: 110 MS
EKG QRS DURATION: 98 MS
EKG QTC CALCULATION (BAZETT): 404 MS
EKG QTC CALCULATION (BAZETT): 435 MS
EKG R AXIS: -25 DEGREES
EKG R AXIS: -32 DEGREES
EKG T AXIS: 14 DEGREES
EKG T AXIS: 57 DEGREES
EKG VENTRICULAR RATE: 120 BPM
EKG VENTRICULAR RATE: 71 BPM
EOSINOPHIL # BLD: 0.1 K/UL (ref 0–0.6)
EOSINOPHIL NFR BLD: 2.4 %
GFR SERPLBLD CREATININE-BSD FMLA CKD-EPI: >60 ML/MIN/{1.73_M2}
GFR SERPLBLD CREATININE-BSD FMLA CKD-EPI: >60 ML/MIN/{1.73_M2}
GLUCOSE SERPL-MCNC: 112 MG/DL (ref 70–99)
GLUCOSE SERPL-MCNC: 118 MG/DL (ref 70–99)
HCT VFR BLD AUTO: 43.3 % (ref 40.5–52.5)
HGB BLD-MCNC: 14.9 G/DL (ref 13.5–17.5)
LV EF: 58 %
LVEF MODALITY: NORMAL
LYMPHOCYTES # BLD: 1.6 K/UL (ref 1–5.1)
LYMPHOCYTES NFR BLD: 30.7 %
MAGNESIUM SERPL-MCNC: 2.2 MG/DL (ref 1.8–2.4)
MCH RBC QN AUTO: 33.9 PG (ref 26–34)
MCHC RBC AUTO-ENTMCNC: 34.4 G/DL (ref 31–36)
MCV RBC AUTO: 98.6 FL (ref 80–100)
MONOCYTES # BLD: 0.5 K/UL (ref 0–1.3)
MONOCYTES NFR BLD: 9.5 %
NEUTROPHILS # BLD: 3 K/UL (ref 1.7–7.7)
NEUTROPHILS NFR BLD: 56.7 %
NT-PROBNP SERPL-MCNC: 109 PG/ML (ref 0–449)
PHOSPHATE SERPL-MCNC: 2.7 MG/DL (ref 2.5–4.9)
PLATELET # BLD AUTO: 151 K/UL (ref 135–450)
PMV BLD AUTO: 8.1 FL (ref 5–10.5)
POTASSIUM SERPL-SCNC: 4 MMOL/L (ref 3.5–5.1)
POTASSIUM SERPL-SCNC: 4 MMOL/L (ref 3.5–5.1)
PROT SERPL-MCNC: 7.7 G/DL (ref 6.4–8.2)
RBC # BLD AUTO: 4.39 M/UL (ref 4.2–5.9)
SODIUM SERPL-SCNC: 138 MMOL/L (ref 136–145)
SODIUM SERPL-SCNC: 143 MMOL/L (ref 136–145)
TROPONIN, HIGH SENSITIVITY: 8 NG/L (ref 0–22)
TSH SERPL DL<=0.005 MIU/L-ACNC: 2.21 UIU/ML (ref 0.27–4.2)
WBC # BLD AUTO: 5.4 K/UL (ref 4–11)

## 2023-05-02 PROCEDURE — 94760 N-INVAS EAR/PLS OXIMETRY 1: CPT

## 2023-05-02 PROCEDURE — 80069 RENAL FUNCTION PANEL: CPT

## 2023-05-02 PROCEDURE — 93005 ELECTROCARDIOGRAM TRACING: CPT | Performed by: INTERNAL MEDICINE

## 2023-05-02 PROCEDURE — C8929 TTE W OR WO FOL WCON,DOPPLER: HCPCS

## 2023-05-02 PROCEDURE — 85025 COMPLETE CBC W/AUTO DIFF WBC: CPT

## 2023-05-02 PROCEDURE — 2060000000 HC ICU INTERMEDIATE R&B

## 2023-05-02 PROCEDURE — 96366 THER/PROPH/DIAG IV INF ADDON: CPT

## 2023-05-02 PROCEDURE — 2500000003 HC RX 250 WO HCPCS: Performed by: STUDENT IN AN ORGANIZED HEALTH CARE EDUCATION/TRAINING PROGRAM

## 2023-05-02 PROCEDURE — 2580000003 HC RX 258: Performed by: EMERGENCY MEDICINE

## 2023-05-02 PROCEDURE — 36415 COLL VENOUS BLD VENIPUNCTURE: CPT

## 2023-05-02 PROCEDURE — 96372 THER/PROPH/DIAG INJ SC/IM: CPT

## 2023-05-02 PROCEDURE — 6370000000 HC RX 637 (ALT 250 FOR IP): Performed by: NURSE PRACTITIONER

## 2023-05-02 PROCEDURE — 96365 THER/PROPH/DIAG IV INF INIT: CPT

## 2023-05-02 PROCEDURE — 99223 1ST HOSP IP/OBS HIGH 75: CPT | Performed by: NURSE PRACTITIONER

## 2023-05-02 PROCEDURE — 71045 X-RAY EXAM CHEST 1 VIEW: CPT

## 2023-05-02 PROCEDURE — 83735 ASSAY OF MAGNESIUM: CPT

## 2023-05-02 PROCEDURE — 2500000003 HC RX 250 WO HCPCS: Performed by: EMERGENCY MEDICINE

## 2023-05-02 PROCEDURE — 84443 ASSAY THYROID STIM HORMONE: CPT

## 2023-05-02 PROCEDURE — 6360000002 HC RX W HCPCS: Performed by: EMERGENCY MEDICINE

## 2023-05-02 PROCEDURE — 2580000003 HC RX 258: Performed by: STUDENT IN AN ORGANIZED HEALTH CARE EDUCATION/TRAINING PROGRAM

## 2023-05-02 RX ORDER — SODIUM CHLORIDE 0.9 % (FLUSH) 0.9 %
5-40 SYRINGE (ML) INJECTION EVERY 12 HOURS SCHEDULED
Status: DISCONTINUED | OUTPATIENT
Start: 2023-05-02 | End: 2023-05-02 | Stop reason: HOSPADM

## 2023-05-02 RX ORDER — DILTIAZEM HYDROCHLORIDE 120 MG/1
120 CAPSULE, COATED, EXTENDED RELEASE ORAL DAILY
Qty: 30 CAPSULE | Refills: 3 | Status: SHIPPED | OUTPATIENT
Start: 2023-05-02

## 2023-05-02 RX ORDER — LOSARTAN POTASSIUM 100 MG/1
50 TABLET ORAL DAILY
Qty: 90 TABLET | Refills: 2 | Status: SHIPPED | OUTPATIENT
Start: 2023-05-02

## 2023-05-02 RX ORDER — ENOXAPARIN SODIUM 100 MG/ML
1 INJECTION SUBCUTANEOUS ONCE
Status: COMPLETED | OUTPATIENT
Start: 2023-05-02 | End: 2023-05-02

## 2023-05-02 RX ORDER — SODIUM CHLORIDE 9 MG/ML
INJECTION, SOLUTION INTRAVENOUS PRN
Status: DISCONTINUED | OUTPATIENT
Start: 2023-05-02 | End: 2023-05-02 | Stop reason: HOSPADM

## 2023-05-02 RX ORDER — LOSARTAN POTASSIUM 50 MG/1
50 TABLET ORAL DAILY
Status: DISCONTINUED | OUTPATIENT
Start: 2023-05-02 | End: 2023-05-02 | Stop reason: HOSPADM

## 2023-05-02 RX ORDER — SODIUM CHLORIDE 0.9 % (FLUSH) 0.9 %
5-40 SYRINGE (ML) INJECTION PRN
Status: DISCONTINUED | OUTPATIENT
Start: 2023-05-02 | End: 2023-05-02 | Stop reason: HOSPADM

## 2023-05-02 RX ORDER — ENOXAPARIN SODIUM 150 MG/ML
1 INJECTION SUBCUTANEOUS 2 TIMES DAILY
Status: DISCONTINUED | OUTPATIENT
Start: 2023-05-02 | End: 2023-05-02

## 2023-05-02 RX ORDER — ATORVASTATIN CALCIUM 20 MG/1
20 TABLET, FILM COATED ORAL NIGHTLY
Status: DISCONTINUED | OUTPATIENT
Start: 2023-05-02 | End: 2023-05-02 | Stop reason: HOSPADM

## 2023-05-02 RX ORDER — DILTIAZEM HYDROCHLORIDE 120 MG/1
120 CAPSULE, COATED, EXTENDED RELEASE ORAL DAILY
Status: DISCONTINUED | OUTPATIENT
Start: 2023-05-02 | End: 2023-05-02 | Stop reason: HOSPADM

## 2023-05-02 RX ADMIN — SODIUM CHLORIDE, PRESERVATIVE FREE 10 ML: 5 INJECTION INTRAVENOUS at 09:05

## 2023-05-02 RX ADMIN — ENOXAPARIN SODIUM 110 MG: 100 INJECTION SUBCUTANEOUS at 00:44

## 2023-05-02 RX ADMIN — DILTIAZEM HYDROCHLORIDE 120 MG: 120 CAPSULE, EXTENDED RELEASE ORAL at 13:50

## 2023-05-02 RX ADMIN — LOSARTAN POTASSIUM 50 MG: 50 TABLET, FILM COATED ORAL at 13:50

## 2023-05-02 RX ADMIN — DILTIAZEM HYDROCHLORIDE 5 MG/HR: 5 INJECTION INTRAVENOUS at 00:12

## 2023-05-02 RX ADMIN — Medication 7.5 MG/HR: at 03:19

## 2023-05-02 NOTE — ED NOTES
Patient up to bedside to urinate. Denies c/o dizziness or shortness of breath.       Maryanne Morrison RN  05/02/23 6365

## 2023-05-02 NOTE — ED NOTES
Dr. Kindra Perera returned page and speaking with Dr. Iris Connolly.       Barber Carrington RN  05/02/23 9851

## 2023-05-02 NOTE — PLAN OF CARE
Problem: Discharge Planning  Goal: Discharge to home or other facility with appropriate resources  Outcome: Progressing     Problem: ABCDS Injury Assessment  Goal: Absence of physical injury  Outcome: Progressing Bilateral Helical Rim Advancement Flap Text: The defect edges were debeveled with a #15 blade scalpel.  Given the location of the defect and the proximity to free margins (helical rim) a bilateral helical rim advancement flap was deemed most appropriate.  Using a sterile surgical marker, the appropriate advancement flaps were drawn incorporating the defect and placing the expected incisions between the helical rim and antihelix where possible.  The area thus outlined was incised through and through with a #15 scalpel blade.  With a skin hook and iris scissors, the flaps were gently and sharply undermined and freed up.

## 2023-05-02 NOTE — DISCHARGE SUMMARY
Hospital Medicine Discharge Summary      Patient Identification:   Lluvia Stern   : 1945  MRN: [id number]   Account: [de-identified]      Patient's PCP: Roxanne Rios MD    Admit Date: 2023     Discharge Date:   2023    Admitting Physician: Loretta Pillai DO     Discharge Physician: Lei Cavazos MD     Consults:     IP CONSULT TO CARDIOLOGY    Disposition: Home    Condition at Discharge: Stable    Code Status:  Full Code       Discharge Diagnoses:    Newly recognized atrial fibrillation with rapid ventricular response    Essential hypertension  Dyslipidemia    History of gastritis, no GI bleeding    Obesity    Hospital Course:   Lluvia Stern is a 69 y.o. male hospitalized   2023   chest discomfort, palpitation, found to have atrial fibrillation with rapid ventricular response, heart rate improved with Cardizem sooner than expected. He was evaluated by electrophysiology cardiology team.  Patient discharged on Cardizem p.o. 120 mg, he is to stop amlodipine, losartan reduced to 50 mg. [id number] score is 3. TSH was normal.  Patient started on Eliquis. A full discussion of the nature of anticoagulants has been carried out. A benefit risk analysis has been presented to the patient, so that they understand the justification for choosing anticoagulation at this time. Side effects of potential bleeding are discussed. patiet to call or go to ED if any signs of abnormal bleeding. Patient discharged with event monitor, he is to follow-up with the EP team as outpatient    Patient seen and examined in the day of discharge. Vital signs reviewed. BP (!) 147/80   Pulse 65   Temp 98.1 °F (36.7 °C) (Oral)   Resp 18   Ht 6' 1\" (1.854 m)   Wt 235 lb 0.2 oz (106.6 kg)   SpO2 95%   BMI 31.01 kg/m²     Patient alert, oriented,*. No acute distress, sinus rhythm, clear lungs, normal first and second heart sound, no murmur no lower extremity edema.   Patient reached the maximum

## 2023-05-02 NOTE — ED NOTES
Nursing report called to [facility] and given to [employer]. Patient is enroute to [facility] at this time.       Jay Weston RN  05/02/23 0831

## 2023-05-02 NOTE — H&P
History and Physical      Name:  Reji Roach /Age/Sex: 1945  (69 y.o. male)   MRN & CSN:  [id number] & [id number] Encounter Date/Time: 2023 3:15 AM EDT   Location:  [id number]/5265-01 PCP: Duncan Taveras MD       Hospital Day: 1    Assessment and Plan:     #. A-fib with RVR-new onset  -Patient's heart rate was 137 at presentation  -Was given Cardizem bolus and started on a drip  -Patient converted to NSR after transfer  -Repeat EKG ordered  -Continue Lovenox for anticoagulation  -2D echo ordered  -Cardiology consult    #. Hypertension  -Patient is on amlodipine, losartan-hold until evaluated by cardiology and further recommendations. #.  Hyperlipidemia-on atorvastatin 20 mg daily    #. Severe hemorrhagic gastritis-patient not taking Protonix  -EGD-2022-small sliding hiatal hernia, severe hemorrhagic gastritis, duodenal bulb edema and patchy erythema    #. Obesity with BMI 31.82    #. Alcohol use    Disposition:   Current Living situation: home    Diet ADULT DIET; Regular; Low Sodium (2 gm)   DVT Prophylaxis [x] Lovenox   Code Status Full Code   Surrogate Decision Maker/ POA      History from:   EMR, patient. History of Present Illness:     Chief Complaint: New onset atrial fibrillation (HCC)  Reji Roach is a 69 y.o. male with hypertension, hyperlipidemia, severe hemorrhagic gastritis presented to [facility] ED with complaints of palpitations. Patient reported that he woke up from sleep and was watching TV when he felt weird. Patient reported that he was felt something was wrong. He checked his heart rate and oxygen with his pulse oximeter. He reported that his heart rate was 44 but it was erratic. Patient thought it was not right and hence decided to go to the ER. Patient denied any chest pain but felt lightheaded.   Patient denying any other complaints no nausea, vomiting, fever, chills, cough, denied any abdominal pain, denied any urinary complaints, denied any constipation

## 2023-05-02 NOTE — CONSULTS
nature of atrial fibrillation including treatment options such as medical therapy and ablation. For now we will change amlodipine to diltiazem 120 mg daily for rate control and pursue 30-day monitor at discharge to see what overall burden is    Essential HTN   - BP improved this morning   - change amlodipine to diltiazem, continue losartan    Discussed with Dr. Carter Mariscal. EP issues are stable, will sign off and arrange follow up. Please call with concerns.     OLGA Ochoa  [facility], [address], [address] [facility]  Phone: [phone]  Fax: [phone]    Electronically signed by OLGA Hoang - CNP on 5/2/2023 at 8:40 AM

## 2023-05-02 NOTE — PROGRESS NOTES
Patient arrived to room 5265 from [facility]. Patient able to ambulate to bathroom and back to bed without difficulty. Patient connected to telemetry and Afib at rate 115 was confirmed with CMU. Shortly after around 0320, patient converted to NSR with rates in the 70-80s. EKG ordered to confirm switch. Patient arrived from [facility] with cardizem gtt running at 10mg/hr. Once patient's HR and rhythm became regular, this RN began to titrate his gtt down in increments of 2.5 in 30 minute intervals, per order. Cardizem gtt currently running at 5mg/hr, see eMAR. HR 69m, NSR. Patient and his wife deny any further needs or questions at this time. Call light and bedside table within reach.

## 2023-05-02 NOTE — PROGRESS NOTES
EKG confirmed NSR. Strip placed in patient's chart. Patient's heart rate 67, NSR, cardizem gtt turned off at this time. Patient educated on reasoning behind stopping the cardizem gtt, patient denies any questions at this time.

## 2023-05-02 NOTE — ED TRIAGE NOTES
Patient ambulatory to Room 2 with c/o an erratic pulse and \"I feel weird\". Patient reports he was watching tv and felt weird, took his pulse and states it felt erratic. Reports he did feel slightly dizzy when he stood up at home. He denies chest pain, denies shortness of breath, denies previous cardiac history other than hypertension. Patient reports it feels like he has to have a bowel movement. He is awake, alert, oriented, respirations easy & regular, skin w/d, cap refill brisk, patient shivering during triage assessment and warm blankets provided. Patient placed on cardiac monitor and is in atrial fibrillation. Dr. Golda Gilford in room to examine patient. Fall risk screening completed, and patient screens as fall risk due to age only. Fall risk bracelet applied to patient. Non-skid socks provided and placed on patient. The fall risk is indicated using  fall sign . Based on score, a bed alarm was not indicated. The call light is within the patient's reach, and instructions for use were provided. The bed is in the lowest position with wheels locked. The patient has been advised to notify staff, using the call light, if there is a need to get up or use restroom. The patient verbalized understanding of safety precautions and how to contact staff for assistance.

## 2023-05-02 NOTE — ED NOTES
[facility] arrived to transport patient to [facility], report given to transport team using SBAR. Patient is not in pain at present. Remains Atrial fibrillation RVR on cardiac monitor and receiving diltiazem 10 mg/hr. Respirations easy & regular, skin w/d, cap refill brisk. 20g IV intact in right AC, site soft without redness or edema. Belongs bagged and sent with patient on stretcher. Patient reports his significant other is taking his wallet home with her.       Berto Blanco RN  05/02/23 0732

## 2023-05-02 NOTE — ED PROVIDER NOTES
CHIEF COMPLAINT  Chief Complaint   Patient presents with    Palpitations     Patient watching tv and felt weird, took his pulse and states it felt erratic. Denies chest pain, denies shortness of breath, denies cardiac history       HISTORY OF PRESENT ILLNESS  Nadir Estevez is a 69 y.o. male who presents to the ED complaining of 90-minute history of onset of palpitations, rapid heart rate, lightheadedness. Patient denies chest pain, jaw pain or back pain. No history of atrial fibrillation. No history of cardiac disease or lung disease. Non-smoker. Patient denies any illegal substance use. No lower extremity edema or calf pain. No syncope. No other complaints, modifying factors or associated symptoms. Nursing notes reviewed.    Past Medical History:   Diagnosis Date    Abnormal finding 7/1/08    mitch    Abnormal finding     Hemoccult-- 7/20/2009 guiac neg// PSA-- 1/7/2011 .80     Cancer (HCC)     basal cell left ear, right cheek    Carpal tunnel syndrome on right     Dermatophytosis of nail     Disturbance of skin sensation     Hypercholesteremia     Hypertension     Impaired fasting glucose     Kidney stone     Routine general medical examination at a health care facility     Special screening for malignant neoplasm of prostate     TGA (transient global amnesia)     Weight gain      Past Surgical History:   Procedure Laterality Date    ANKLE SURGERY  2002    right ankle plate     CARPAL TUNNEL RELEASE  1999    right hand    COLONOSCOPY  11/30/07    COLONOSCOPY  04/23/2018    polyp dr jonas check in 5 years    COLONOSCOPY N/A 9/13/2022    COLONOSCOPY POLYPECTOMY SNARE/COLD BIOPSY performed by Sam Sosa MD at [address]      basal cell ear and cheek    UPPER GASTROINTESTINAL ENDOSCOPY N/A 9/13/2022    EGD BIOPSY performed by Sam Sosa MD at [address] History   Problem Relation Age of Onset    Cancer Mother         pancreatic cancer     Heart Attack

## 2023-05-02 NOTE — ED NOTES
Dr. Clara Gann in room to discuss test results and plan to admit patient to [address] . Patient remains in atrial fibrillation on cardiac monitor at this time. Fall risk precautions maintained. Patient's significant other at bedside. Both deny further needs at this time.       Osbaldo Steele RN  05/02/23 3094

## 2023-05-02 NOTE — DISCHARGE INSTR - DIET

## 2023-05-02 NOTE — PLAN OF CARE
Problem: Discharge Planning  Goal: Discharge to home or other facility with appropriate resources  5/2/2023 1401 by Sammie Ozuna RN  Outcome: Progressing  5/2/2023 0526 by Timbo Perez RN  Outcome: Progressing     Problem: ABCDS Injury Assessment  Goal: Absence of physical injury  5/2/2023 1401 by Sammie Ozuna RN  Outcome: Progressing  5/2/2023 0526 by Timbo Perez RN  Outcome: Progressing

## 2023-05-04 ENCOUNTER — TELEPHONE (OUTPATIENT)
Dept: FAMILY MEDICINE CLINIC | Age: 78
End: 2023-05-04

## 2023-05-04 ENCOUNTER — TELEPHONE (OUTPATIENT)
Dept: CARDIOLOGY CLINIC | Age: 78
End: 2023-05-04

## 2023-05-04 NOTE — TELEPHONE ENCOUNTER
Patient enrolled online thru [facility].   Christine brought monitor to room 5265 and placed on patient at [facility] [place] [facility] [place] as requested by [facility] NP.

## 2023-05-04 NOTE — TELEPHONE ENCOUNTER
Care Transitions Initial Follow Up Call    Outreach made within 2 business days of discharge: Yes    Patient: Erma Reid Patient : 1945   MRN: [id number]  Reason for Admission: There are no discharge diagnoses documented for the most recent discharge. Discharge Date: 23       Spoke with: Katya Voss    Discharge department/facility: [facility] Interactive Patient Contact:  Was patient able to fill all prescriptions: Yes  Was patient instructed to bring all medications to the follow-up visit: Yes  Is patient taking all medications as directed in the discharge summary?  Yes  Does patient understand their discharge instructions: Yes  Does patient have questions or concerns that need addressed prior to 7-14 day follow up office visit: no    Scheduled appointment with PCP within 7-14 days    Follow Up  Future Appointments   Date Time Provider Rasheed Hawk   2023 11:20 AM Dayna Rose MD [facility]   2023  1:00 PM OLGA Multani - CNP [facility]   2023 10:45 AM Yovany Botello MD [place], [place]

## 2023-05-11 ENCOUNTER — OFFICE VISIT (OUTPATIENT)
Dept: FAMILY MEDICINE CLINIC | Age: 78
End: 2023-05-11
Payer: MEDICARE

## 2023-05-11 VITALS
HEIGHT: 73 IN | HEART RATE: 60 BPM | TEMPERATURE: 98.1 F | WEIGHT: 237 LBS | BODY MASS INDEX: 31.41 KG/M2 | DIASTOLIC BLOOD PRESSURE: 80 MMHG | SYSTOLIC BLOOD PRESSURE: 116 MMHG

## 2023-05-11 DIAGNOSIS — R73.09 OTHER ABNORMAL GLUCOSE: ICD-10-CM

## 2023-05-11 DIAGNOSIS — Z09 HOSPITAL DISCHARGE FOLLOW-UP: ICD-10-CM

## 2023-05-11 DIAGNOSIS — I10 ESSENTIAL HYPERTENSION: ICD-10-CM

## 2023-05-11 DIAGNOSIS — I48.91 NEW ONSET ATRIAL FIBRILLATION (HCC): Primary | ICD-10-CM

## 2023-05-11 PROCEDURE — 1123F ACP DISCUSS/DSCN MKR DOCD: CPT | Performed by: FAMILY MEDICINE

## 2023-05-11 PROCEDURE — 99214 OFFICE O/P EST MOD 30 MIN: CPT | Performed by: FAMILY MEDICINE

## 2023-05-11 PROCEDURE — 3079F DIAST BP 80-89 MM HG: CPT | Performed by: FAMILY MEDICINE

## 2023-05-11 PROCEDURE — 3074F SYST BP LT 130 MM HG: CPT | Performed by: FAMILY MEDICINE

## 2023-05-11 SDOH — ECONOMIC STABILITY: HOUSING INSECURITY
IN THE LAST 12 MONTHS, WAS THERE A TIME WHEN YOU DID NOT HAVE A STEADY PLACE TO SLEEP OR SLEPT IN A SHELTER (INCLUDING NOW)?: NO

## 2023-05-11 SDOH — ECONOMIC STABILITY: FOOD INSECURITY: WITHIN THE PAST 12 MONTHS, YOU WORRIED THAT YOUR FOOD WOULD RUN OUT BEFORE YOU GOT MONEY TO BUY MORE.: NEVER TRUE

## 2023-05-11 SDOH — ECONOMIC STABILITY: INCOME INSECURITY: HOW HARD IS IT FOR YOU TO PAY FOR THE VERY BASICS LIKE FOOD, HOUSING, MEDICAL CARE, AND HEATING?: NOT HARD AT ALL

## 2023-05-11 SDOH — ECONOMIC STABILITY: FOOD INSECURITY: WITHIN THE PAST 12 MONTHS, THE FOOD YOU BOUGHT JUST DIDN'T LAST AND YOU DIDN'T HAVE MONEY TO GET MORE.: NEVER TRUE

## 2023-05-11 ASSESSMENT — PATIENT HEALTH QUESTIONNAIRE - PHQ9
2. FEELING DOWN, DEPRESSED OR HOPELESS: 0
SUM OF ALL RESPONSES TO PHQ QUESTIONS 1-9: 0
1. LITTLE INTEREST OR PLEASURE IN DOING THINGS: 0
SUM OF ALL RESPONSES TO PHQ QUESTIONS 1-9: 0
SUM OF ALL RESPONSES TO PHQ QUESTIONS 1-9: 0
SUM OF ALL RESPONSES TO PHQ9 QUESTIONS 1 & 2: 0
SUM OF ALL RESPONSES TO PHQ QUESTIONS 1-9: 0

## 2023-05-11 ASSESSMENT — ENCOUNTER SYMPTOMS
SHORTNESS OF BREATH: 0
DIARRHEA: 0
BLOOD IN STOOL: 0
ABDOMINAL DISTENTION: 0
VOMITING: 0
ABDOMINAL PAIN: 0
CONSTIPATION: 0
NAUSEA: 0
CHEST TIGHTNESS: 0

## 2023-05-11 NOTE — PROGRESS NOTES
Subjective:      Patient ID: Tyler Campbell is a 69 y.o. male. Chief Complaint   Patient presents with    Follow-Up from [facility] 5-2-2023 \"A-fib\"    6 Month Follow-Up     Hypertension, lipids        Patient presents with: Follow-Up [facility] 5-2-2023 \"A-fib\"  6 Month Follow-Up: Hypertension, lipids    Here for the above   He did not feel well and noted irregular pulse and went to the ER    Meds reviewed   He tells me no longer on the protonix as he is well   He tells me gi told him he could stop    Here with wife and discussed with her as well     YOB: 1945    Date of Visit:  5/11/2023     -- Bee Venom -- Swelling    --  Has epi-pen   -- Penicillins -- Hives and Itching   -- Vicodin [Hydrocodone-Acetaminophen] -- Rash    Current Outpatient Medications:  apixaban (ELIQUIS) 5 MG TABS tablet, Take 1 tablet by mouth 2 times daily, Disp: 60 tablet, Rfl: 3  dilTIAZem (CARDIZEM CD) 120 MG extended release capsule, Take 1 capsule by mouth daily, Disp: 30 capsule, Rfl: 3  losartan (COZAAR) 100 MG tablet, Take 0.5 tablets by mouth daily, Disp: 90 tablet, Rfl: 2  atorvastatin (LIPITOR) 20 MG tablet, TAKE 1 TABLET BY MOUTH AT BEDTIME, Disp: 90 tablet, Rfl: 2  Acetaminophen (TYLENOL PO), Take 650 mg by mouth as needed, Disp: , Rfl:   pantoprazole (PROTONIX) 40 MG tablet, Take 1 tablet by mouth every morning (before breakfast), Disp: 30 tablet, Rfl: 0  EPINEPHrine (EPIPEN) 0.3 MG/0.3ML SOAJ injection, Use as directed for allergic reaction, Disp: 1 each, Rfl: 0  hydrocortisone 2.5 % cream, Apply topically 2 times daily.  prn, Disp: 45 g, Rfl: 0  Multiple Vitamin (MULTIVITAMIN) capsule, Take 1 capsule by mouth daily, Disp: , Rfl:     No current facility-administered medications for this visit.      ---------------------------               05/11/23                      1113         ---------------------------   BP:          116/80         Site:    Left Upper Arm

## 2023-05-31 ENCOUNTER — TELEPHONE (OUTPATIENT)
Dept: CARDIOLOGY CLINIC | Age: 78
End: 2023-05-31

## 2023-05-31 NOTE — TELEPHONE ENCOUNTER
Pt called and would like to speak to [address]. Has some questions concerning his 30 day monitor and some other medical questions.       Shea Draper # [phone]

## 2023-05-31 NOTE — TELEPHONE ENCOUNTER
I cancelled ALVARADO Bello appt on 6/6 and kept the 6/7 appt with Dr. Adrian Verma if patient returns call. Thanks.

## 2023-05-31 NOTE — TELEPHONE ENCOUNTER
ALVARADO Bello  Contacted patient and he was persistent that he wanted about updating you that he had to send back 1st monitor and the company send him a 2nd one due to malfunctioning. He wanted you to know that he was not monitored for 1.5 days (5/23.) He also wants to know why he has and if needed a back to back follow up with you and Dr. Lori Lee on 6/6 and 6/7.

## 2023-06-06 DIAGNOSIS — I48.0 PAF (PAROXYSMAL ATRIAL FIBRILLATION) (HCC): ICD-10-CM

## 2023-06-06 DIAGNOSIS — I48.0 PAROXYSMAL ATRIAL FIBRILLATION (HCC): Primary | ICD-10-CM

## 2023-06-06 PROCEDURE — 93272 ECG/REVIEW INTERPRET ONLY: CPT | Performed by: NURSE PRACTITIONER

## 2023-06-07 ENCOUNTER — TELEPHONE (OUTPATIENT)
Dept: CARDIOLOGY CLINIC | Age: 78
End: 2023-06-07

## 2023-06-07 ENCOUNTER — OFFICE VISIT (OUTPATIENT)
Dept: CARDIOLOGY CLINIC | Age: 78
End: 2023-06-07
Payer: MEDICARE

## 2023-06-07 VITALS
BODY MASS INDEX: 31.81 KG/M2 | HEIGHT: 73 IN | WEIGHT: 240 LBS | HEART RATE: 59 BPM | DIASTOLIC BLOOD PRESSURE: 88 MMHG | OXYGEN SATURATION: 99 % | SYSTOLIC BLOOD PRESSURE: 134 MMHG

## 2023-06-07 DIAGNOSIS — Z79.01 CURRENT USE OF ANTICOAGULANT THERAPY: ICD-10-CM

## 2023-06-07 DIAGNOSIS — I48.0 PAF (PAROXYSMAL ATRIAL FIBRILLATION) (HCC): Primary | ICD-10-CM

## 2023-06-07 PROCEDURE — 93000 ELECTROCARDIOGRAM COMPLETE: CPT | Performed by: INTERNAL MEDICINE

## 2023-06-07 PROCEDURE — 3075F SYST BP GE 130 - 139MM HG: CPT | Performed by: INTERNAL MEDICINE

## 2023-06-07 PROCEDURE — 3079F DIAST BP 80-89 MM HG: CPT | Performed by: INTERNAL MEDICINE

## 2023-06-07 PROCEDURE — 99215 OFFICE O/P EST HI 40 MIN: CPT | Performed by: INTERNAL MEDICINE

## 2023-06-07 PROCEDURE — 1123F ACP DISCUSS/DSCN MKR DOCD: CPT | Performed by: INTERNAL MEDICINE

## 2023-06-07 RX ORDER — FLECAINIDE ACETATE 100 MG/1
100 TABLET ORAL 2 TIMES DAILY PRN
Qty: 60 TABLET | Refills: 3 | Status: SHIPPED | OUTPATIENT
Start: 2023-06-07

## 2023-06-07 NOTE — PATIENT INSTRUCTIONS
If you have any question regarding your ablation please contact Dr. Himanshu Bianchi Nurse at [phone]. Our  Angélica will call to schedule your procedure    Atrial fibrillation Ablation Pre procedure Instructions    Date:______________________________    Arrive at:__________________________    Procedure time:____________________    The morning of your procedure you will park in the hospital parking lot and report directly to the cath lab to check in. At the information desk stay right and go all the way to the end of the kennedy, this will take you directly to your check in desk for the cath lab. Pre-Procedure Instructions   You will need to fast (nothing to eat or drink) after midnight the day of your procedure. Do NOT chew gum or eat mints the day of your procedure  You will need to hold your Eliquis the morning of the procedure. You will need to hold your Losartan & Diltiazem the morning of the procedure. Do not use any lotions, creams or perfume the morning of procedure. You will need to complete pre-procedure lab work 5-7 days prior to your procedure. Please have a responsible adult to drive you home after procedure, you should go home same day, but there is always a possibility of an overnight stay. Cath lab will provide you with all post procedure instructions  Follow up one month with Dr. Dot Horn after the procedure and three months with Diane Funk CNP after procedure                                          [address] [place] [address]/[facility] [address] [facility]  Phone: [phone]  The hours are Mon -Fri.   6:30 am - 4:00 pm   Saturday 8:00 am - noon  No appointment necessary

## 2023-06-07 NOTE — TELEPHONE ENCOUNTER
Please reach out to patient and schedule SHANNA atrial fibrillation ablation with Dr. Alex Otero at [facility]. Patient request late fall, early winter time frame. Anesthesia is needed   Echo tech is needed for SHANNA. Include Carto Rep in email [email or web address]      Atrial fibrillation Ablation Pre procedure Instructions    Date:______________________________    Arrive at:__________________________    Procedure time:____________________    The morning of your procedure you will park in the hospital parking lot and report directly to the cath lab to check in. At the information desk stay right and go all the way to the end of the kennedy, this will take you directly to your check in desk for the cath lab. Pre-Procedure Instructions   You will need to fast (nothing to eat or drink) after midnight the day of your procedure. Do NOT chew gum or eat mints the day of your procedure  You will need to hold your Eliquis the morning of the procedure. You will need to hold your Losartan & Diltiazem the morning of the procedure. Do not use any lotions, creams or perfume the morning of procedure. You will need to complete pre-procedure lab work 5-7 days prior to your procedure. Please have a responsible adult to drive you home after procedure, you should go home same day, but there is always a possibility of an overnight stay. Cath lab will provide you with all post procedure instructions  Follow up one month with Dr. Alex Otero after the procedure and three months with Sanjana Sebastian CNP after procedure                                          [address] [place] [address]/[facility] [address] [place] [address]  Phone: [phone]  The hours are Mon -Fri.   6:30 am - 4:00 pm   Saturday 8:00 am - noon  No appointment necessary

## 2023-06-07 NOTE — TELEPHONE ENCOUNTER
Spoke with the patient and he requested a late Nov procedure date. We went over instructions below and he verbalized understanding. I also tried to email him but it kept bouncing back. If he calls in the instructions will need to be mailed to him. Atrial fibrillation Ablation Pre procedure Instructions     Date: 11/30/2023     Arrive at: 6:45 am   Procedure time: 7:30 am      The morning of your procedure you will park in the hospital parking lot and report directly to the information desk  to check in. Once checked in they will direct you down the hallway to the cath lab     Pre-Procedure Instructions   You will need to fast (nothing to eat or drink) after midnight the day of your procedure. Do NOT chew gum or eat mints the day of your procedure  You will need to hold your Eliquis the morning of the procedure. You will need to hold your Losartan & Diltiazem the morning of the procedure. Do not use any lotions, creams or perfume the morning of procedure. You will need to complete pre-procedure lab work 5-7 days prior to your procedure. Please have a responsible adult to drive you home after procedure, you should go home same day, but there is always a possibility of an overnight stay. Cath lab will provide you with all post procedure instructions  Follow up one month with Dr. Silverio Bolivar after the procedure and three months with Miguel Michaud CNP after procedure                                             [address] [place] [address]/[facility] [address] [place] [address]  Phone: [phone]  The hours are Mon -Fri. 6:30 am - 4:00 pm   Saturday 8:00 am - noon  No appointment necessary         Qgenda update / Added in [address] [place] [address] Rd / Emailed cath lab / Catalino Morrison added.

## 2023-06-07 NOTE — PROGRESS NOTES
3. Stress Test:   7/7/22   Normal myocardial perfusion study. Normal myocardial perfusion. Normal LV size and systolic function. Overall findings represent a low risk study. 4. Cath: None      I independently reviewed the ECG, MCOT, echocardiogram, stress test, and coronary angiography/PCI results and used them for my plan of care. Risk   Factors  Component Value   C CHF No 0   H HTN Yes 1   A2 Age >= 76 Yes,  (80 y.o.) 2   D DM No 0   S2 Prior Stroke/TIA No 0   V Vascular Disease No 0   A Age 74-69 No,  (80 y.o.) 0   Sc Sex male 0    [id number]  Score  3   Score last updated 6/6/23 8:79 AM EDT    Click here for a link to the UpToDate guideline \"Atrial Fibrillation: Anticoagulation therapy to prevent embolization    Disclaimer: Risk Score calculation is dependent on accuracy of patient problem list and past encounter diagnosis. Procedures:  1. No prior EP procedures    Assessment/Plan:     Paroxysmal atrial fibrillation with RVR              - first noted on EKG on 5/1/23              - symptomatic with palpitations              - duration of about 4 hours before converting spontaneously              - Echo 05/02/23 with normal EF of 55-60%  - Patient has a [id number] Score 3 (HTN, AGE)  - Continue Eliquis 5 mg BID daily for thromboembolic risk reduction.  -Tolerating well no signs or symptoms of abnormal bruising or bleeding.  - EKG today shows ***    Essential HTN              - Goal 130/80   - Stable ***              - Continue diltiazem 120 mg qd and losartan 50 mg qd      Follow ups      Thank you for allowing me to participate in the care of Adrian Parson. All questions and concerns were addressed to the patient/family. Alternatives to my treatment were discussed.      ***    Miguel Delvalle MD  Cardiac Electrophysiology  [facility]
3 (HTN, AGE)  - Continue Eliquis 5 mg BID daily for thromboembolic risk reduction.  -Tolerating well no signs or symptoms of abnormal bruising or bleeding.  - EKG today shows SB, v-rate 55 bpm.    Mr. Jacek Marshall comes for a follow-up additionally in the hospital where he had A-fib with RVR that self terminated after she was started on diltiazem drip. He was switched to oral diltiazem and sent home. He reports that symptoms of fatigue have resolved. He continues to take anticoagulation with no signs of abnormal bruising or bleeding. We discussed treatment options for atrial fibrillation as documented below. The patient prefers for an early ablation approach to prevent long-term consequences of atrial fibrillation such as atrial remodeling. We will also plan for discontinuation of diltiazem 3 months after ablation. Continue Eliquis lifelong for thromboembolic reduction and assessment medication from anticoagulation for which watchman can be considered. Discussed risk and benefit of the procedure. Plan for atrial fibrillation ablation after he comes back from vacation in August this year. In the meantime we will give him flecainide 100 mg to take twice daily as needed for breakthrough episodes especially when he is traveling.    - Afib risk factors including age, HTN, obesity, inactivity and JOELLEN were discussed with patient. Risk factor modification recommended      - Treatment options including cardioversion, rate control strategy, antiarrhythmics, anticoagulation and possible ablation were discussed with patient. Risks, benefits and alternative of each treatment options were explained. All questions answered                          ~ Information given regarding ablation for pt to review                          ~ The risks, benefits and alternatives of the ablation procedure were discussed with the patient.  The risks including, but not limited to, the risks of bleeding, infection, radiation exposure, injury

## 2023-06-23 DIAGNOSIS — E78.00 PURE HYPERCHOLESTEROLEMIA: ICD-10-CM

## 2023-06-23 RX ORDER — ATORVASTATIN CALCIUM 20 MG/1
TABLET, FILM COATED ORAL
Qty: 90 TABLET | Refills: 2 | Status: SHIPPED | OUTPATIENT
Start: 2023-06-23

## 2023-07-05 ENCOUNTER — TELEPHONE (OUTPATIENT)
Dept: CARDIOLOGY CLINIC | Age: 78
End: 2023-07-05

## 2023-07-05 NOTE — TELEPHONE ENCOUNTER
Jayna Littlejohn called in this morning stating that  changed his BP medication and now his BP is running 160 to 169/90. He stated that he wanted  to know.       Jayna Littlejohn can be reached at [phone]

## 2023-07-05 NOTE — TELEPHONE ENCOUNTER
Patient reportedly checks his blood pressure 3 times a day and states over the 4 days BP has been elevated  Denies syncope, dizziness and /or lightheadedness  I advised to take Losartan in the morning and diltiazem at bedtime, monitor and call with an update of symptoms  He verbalized his understanding

## 2023-07-07 ENCOUNTER — NURSE ONLY (OUTPATIENT)
Dept: CARDIOLOGY CLINIC | Age: 78
End: 2023-07-07
Payer: MEDICARE

## 2023-07-07 VITALS — SYSTOLIC BLOOD PRESSURE: 162 MMHG | HEART RATE: 69 BPM | DIASTOLIC BLOOD PRESSURE: 94 MMHG | OXYGEN SATURATION: 97 %

## 2023-07-07 DIAGNOSIS — I48.0 PAF (PAROXYSMAL ATRIAL FIBRILLATION) (HCC): Primary | ICD-10-CM

## 2023-07-07 PROCEDURE — 93000 ELECTROCARDIOGRAM COMPLETE: CPT | Performed by: INTERNAL MEDICINE

## 2023-07-07 RX ORDER — LOSARTAN POTASSIUM 100 MG/1
100 TABLET ORAL DAILY
Qty: 90 TABLET | Refills: 2 | Status: SHIPPED | OUTPATIENT
Start: 2023-07-07

## 2023-07-07 NOTE — TELEPHONE ENCOUNTER
Tawnya Pradhan can you review and recommend further regarding elevated BP and alternative medications?

## 2023-07-07 NOTE — TELEPHONE ENCOUNTER
Mere Adames called in this afternoon, following up on his call from this morning concerning his blood pressure. He would like a return call before the end of the day, he doesn't want to go the whole weekend feeling like he does. He can be reached at [phone].

## 2023-07-07 NOTE — PROGRESS NOTES
Vitals and ECG reviewed by Obed Lrod CNP  Reports ongoing elevated BP and dizziness causing unsteady gait  Advised to increase losartan to 100 mg daily  Pt v/u
This is a surgical and/or non-medical patient.

## 2023-07-07 NOTE — TELEPHONE ENCOUNTER
Increase losartan to 100mg QD. Would recommend he come in for an EKG and BP check in office, bring home machine. Want to make sure he's not back in A fib with the fatigue.

## 2023-07-07 NOTE — TELEPHONE ENCOUNTER
Fili Scherer called in this morning, his BP readings from yesterday are     5 am - 174/93   5 pm - 155/85    Today 5 am - 177/97    Fili Scherer states he is just \"not right\" and has no energy     Fili Scherer can be reached at [phone]

## 2023-07-18 ENCOUNTER — TELEPHONE (OUTPATIENT)
Dept: CARDIOLOGY CLINIC | Age: 78
End: 2023-07-18

## 2023-07-20 NOTE — TELEPHONE ENCOUNTER
Dr Mohamud Morin pt's BP log has been scanned under the media tab, can you review and provide further recommendations, thanks

## 2023-07-21 ENCOUNTER — TELEPHONE (OUTPATIENT)
Dept: NON INVASIVE DIAGNOSTICS | Age: 78
End: 2023-07-21

## 2023-07-21 DIAGNOSIS — R29.818 SUSPECTED SLEEP APNEA: Primary | ICD-10-CM

## 2023-07-21 RX ORDER — DILTIAZEM HYDROCHLORIDE 240 MG/1
240 CAPSULE, COATED, EXTENDED RELEASE ORAL DAILY
Qty: 30 CAPSULE | Refills: 5 | Status: SHIPPED | OUTPATIENT
Start: 2023-07-21

## 2023-07-21 NOTE — TELEPHONE ENCOUNTER
Spoke with pt, advised on increasing diltiazem. Will also send a referral to sleep medicine as well.

## 2023-07-21 NOTE — TELEPHONE ENCOUNTER
Le Delacruz called the office wanting to know if his BP readings have been reviewed?      Kapil's callback: [phone]

## 2023-07-21 NOTE — TELEPHONE ENCOUNTER
Reviewed home BP readings. Called and left pt a voicemail to call back. Would increase diltiazem to 240mg QD. Should let us or preferably PCP know if BP consistently >130/80. Make sure he is sitting with feet flat on the floor for at least 5 minutes before checking BP.

## 2023-07-24 ENCOUNTER — TELEPHONE (OUTPATIENT)
Dept: FAMILY MEDICINE CLINIC | Age: 78
End: 2023-07-24

## 2023-07-24 NOTE — TELEPHONE ENCOUNTER
Pt calling he stated he is going to need 1 or 2 molars removed and would like a suggestion for an oral surgeon with [facility].      [phone]

## 2023-08-01 ENCOUNTER — TELEPHONE (OUTPATIENT)
Dept: NON INVASIVE DIAGNOSTICS | Age: 78
End: 2023-08-01

## 2023-08-01 NOTE — TELEPHONE ENCOUNTER
Can you please call Ritesh Vera and let him know I reviewed his blood pressures and they look pretty good. Still a little high in AM and we talked about getting a sleep study done. Would keeps meds as they are for now.

## 2023-08-07 ENCOUNTER — OFFICE VISIT (OUTPATIENT)
Dept: FAMILY MEDICINE CLINIC | Age: 78
End: 2023-08-07
Payer: MEDICARE

## 2023-08-07 VITALS
DIASTOLIC BLOOD PRESSURE: 82 MMHG | WEIGHT: 236 LBS | HEIGHT: 73 IN | BODY MASS INDEX: 31.28 KG/M2 | TEMPERATURE: 97.7 F | SYSTOLIC BLOOD PRESSURE: 126 MMHG

## 2023-08-07 DIAGNOSIS — F41.9 ANXIETY: Primary | ICD-10-CM

## 2023-08-07 DIAGNOSIS — I71.40 ABDOMINAL AORTIC ANEURYSM (AAA) WITHOUT RUPTURE, UNSPECIFIED PART (HCC): ICD-10-CM

## 2023-08-07 PROCEDURE — 1123F ACP DISCUSS/DSCN MKR DOCD: CPT | Performed by: FAMILY MEDICINE

## 2023-08-07 PROCEDURE — 99213 OFFICE O/P EST LOW 20 MIN: CPT | Performed by: FAMILY MEDICINE

## 2023-08-07 PROCEDURE — 3079F DIAST BP 80-89 MM HG: CPT | Performed by: FAMILY MEDICINE

## 2023-08-07 PROCEDURE — 3074F SYST BP LT 130 MM HG: CPT | Performed by: FAMILY MEDICINE

## 2023-08-07 RX ORDER — SERTRALINE HYDROCHLORIDE 25 MG/1
25 TABLET, FILM COATED ORAL DAILY
Qty: 30 TABLET | Refills: 3 | Status: SHIPPED | OUTPATIENT
Start: 2023-08-07

## 2023-08-07 ASSESSMENT — PATIENT HEALTH QUESTIONNAIRE - PHQ9
2. FEELING DOWN, DEPRESSED OR HOPELESS: 0
1. LITTLE INTEREST OR PLEASURE IN DOING THINGS: 0
SUM OF ALL RESPONSES TO PHQ QUESTIONS 1-9: 0
SUM OF ALL RESPONSES TO PHQ QUESTIONS 1-9: 0
SUM OF ALL RESPONSES TO PHQ9 QUESTIONS 1 & 2: 0
SUM OF ALL RESPONSES TO PHQ QUESTIONS 1-9: 0
SUM OF ALL RESPONSES TO PHQ QUESTIONS 1-9: 0

## 2023-08-07 NOTE — PATIENT INSTRUCTIONS
Remember a ultrasound of the abdomen   Do start the medicine and let me know if any problem  See in September as scheduled   Consider the RSV vaccine

## 2023-08-13 ENCOUNTER — HOSPITAL ENCOUNTER (EMERGENCY)
Age: 78
Discharge: HOME | End: 2023-08-13
Attending: EMERGENCY MEDICINE
Payer: MEDICARE

## 2023-08-13 ENCOUNTER — APPOINTMENT (OUTPATIENT)
Dept: CT IMAGING | Age: 78
End: 2023-08-13
Payer: MEDICARE

## 2023-08-13 VITALS
HEIGHT: 73 IN | HEART RATE: 70 BPM | BODY MASS INDEX: 31.14 KG/M2 | SYSTOLIC BLOOD PRESSURE: 140 MMHG | RESPIRATION RATE: 16 BRPM | WEIGHT: 235 LBS | DIASTOLIC BLOOD PRESSURE: 80 MMHG | TEMPERATURE: 98.1 F | OXYGEN SATURATION: 95 %

## 2023-08-13 DIAGNOSIS — F41.1 ANXIETY STATE: ICD-10-CM

## 2023-08-13 DIAGNOSIS — R51.9 NONINTRACTABLE HEADACHE, UNSPECIFIED CHRONICITY PATTERN, UNSPECIFIED HEADACHE TYPE: Primary | ICD-10-CM

## 2023-08-13 DIAGNOSIS — I10 ESSENTIAL HYPERTENSION: ICD-10-CM

## 2023-08-13 PROCEDURE — 99284 EMERGENCY DEPT VISIT MOD MDM: CPT

## 2023-08-13 PROCEDURE — 70450 CT HEAD/BRAIN W/O DYE: CPT

## 2023-08-13 PROCEDURE — 6370000000 HC RX 637 (ALT 250 FOR IP): Performed by: EMERGENCY MEDICINE

## 2023-08-13 RX ORDER — ACETAMINOPHEN 500 MG
1000 TABLET ORAL ONCE
Status: COMPLETED | OUTPATIENT
Start: 2023-08-13 | End: 2023-08-13

## 2023-08-13 RX ORDER — HYDROXYZINE HYDROCHLORIDE 10 MG/1
10 TABLET, FILM COATED ORAL NIGHTLY PRN
Qty: 14 TABLET | Refills: 0 | Status: SHIPPED | OUTPATIENT
Start: 2023-08-13 | End: 2023-08-27

## 2023-08-13 RX ADMIN — ACETAMINOPHEN 1000 MG: 500 TABLET ORAL at 05:48

## 2023-08-13 ASSESSMENT — PAIN SCALES - GENERAL
PAINLEVEL_OUTOF10: 0
PAINLEVEL_OUTOF10: 0

## 2023-08-13 ASSESSMENT — PAIN - FUNCTIONAL ASSESSMENT
PAIN_FUNCTIONAL_ASSESSMENT: NONE - DENIES PAIN
PAIN_FUNCTIONAL_ASSESSMENT: NONE - DENIES PAIN

## 2023-08-13 NOTE — ED PROVIDER NOTES
[facility]    Pt Name: Eva Cosme   MRN: [id number]   [facility] 1945   Date of evaluation: 8/13/2023   Provider: Lila Gonzalez MD   PCP: Merly Drew MD   Note Started: 5:42 AM EDT 8/13/23       CHIEF COMPLAINT  Hypertension (Pt states he has had a slight headache since yesterday and high blood pressure.)       HISTORY OF PRESENT ILLNESS  Eva Cosme is a 69 y.o. male who  has a past medical history of Abnormal finding, Abnormal finding, Cancer ([address]), Carpal tunnel syndrome on right, Dermatophytosis of nail, Disturbance of skin sensation, Hypercholesteremia, Hypertension, Impaired fasting glucose, Kidney stone, Routine general medical examination at a health care facility, Special screening for malignant neoplasm of prostate, TGA (transient global amnesia), and Weight gain. who presents to the ED with concerns about his blood pressure being elevated. Patient states he just has not felt quite right for the last 2 days. He was started on Zoloft for anxiety on Wednesday. He has a history of paroxysmal atrial fibrillation. He has not had recurrence. He is on Eliquis. Apparently after he had his bout with atrial fibrillation they had some difficulty getting his blood pressure under control. Patient states his blood pressures have typically been well controlled in the 682-562 systolic range. He has been checking his blood pressure over the last 2 days and has been running in the 571 systolic range. He was having trouble sleeping tonight. Got up about every 2 hours but not because of the headache. She states he just did not feel right. He is not having any chest pain chest pressure shortness of breath. He did have some recent dental work done and he does have some ecchymosis in the right submandibular area from that. He did not fall.     I have reviewed the following from the nursing documentation:        HISTORY :      Past Medical History:   Diagnosis

## 2023-08-14 ENCOUNTER — TELEPHONE (OUTPATIENT)
Dept: CARDIOLOGY CLINIC | Age: 78
End: 2023-08-14

## 2023-08-14 RX ORDER — APIXABAN 5 MG/1
TABLET, FILM COATED ORAL
Qty: 180 TABLET | Refills: 3 | Status: SHIPPED | OUTPATIENT
Start: 2023-08-14

## 2023-08-14 RX ORDER — DILTIAZEM HYDROCHLORIDE 120 MG/1
CAPSULE, COATED, EXTENDED RELEASE ORAL
Qty: 90 CAPSULE | Refills: 3 | Status: SHIPPED | OUTPATIENT
Start: 2023-08-14 | End: 2023-08-15

## 2023-08-14 NOTE — TELEPHONE ENCOUNTER
Pt called and would like to speak to [facility] about his medications.  Sent message back with #      Kyleigh Sparks # [phone]

## 2023-08-14 NOTE — TELEPHONE ENCOUNTER
Spoke with patient instructed to increase Cardizem to 240 mg daily. Continue current medications. He has follow up scheduled with PCP Manuela Gitelman, MD tomorrow 08/15/2023.

## 2023-08-15 ENCOUNTER — OFFICE VISIT (OUTPATIENT)
Dept: FAMILY MEDICINE CLINIC | Age: 78
End: 2023-08-15
Payer: MEDICARE

## 2023-08-15 VITALS
TEMPERATURE: 97.2 F | WEIGHT: 232.8 LBS | BODY MASS INDEX: 30.85 KG/M2 | HEIGHT: 73 IN | SYSTOLIC BLOOD PRESSURE: 138 MMHG | DIASTOLIC BLOOD PRESSURE: 84 MMHG | HEART RATE: 64 BPM

## 2023-08-15 DIAGNOSIS — Z09 HOSPITAL DISCHARGE FOLLOW-UP: ICD-10-CM

## 2023-08-15 DIAGNOSIS — I10 HYPERTENSION, ESSENTIAL, BENIGN: Primary | ICD-10-CM

## 2023-08-15 PROCEDURE — 99213 OFFICE O/P EST LOW 20 MIN: CPT | Performed by: FAMILY MEDICINE

## 2023-08-15 PROCEDURE — 3079F DIAST BP 80-89 MM HG: CPT | Performed by: FAMILY MEDICINE

## 2023-08-15 PROCEDURE — 3075F SYST BP GE 130 - 139MM HG: CPT | Performed by: FAMILY MEDICINE

## 2023-08-15 PROCEDURE — 1123F ACP DISCUSS/DSCN MKR DOCD: CPT | Performed by: FAMILY MEDICINE

## 2023-08-15 NOTE — PROGRESS NOTES
Subjective:      Patient ID: Archie Ngo is a 69 y.o. male. Chief Complaint   Patient presents with    Follow-Up from [facility] 8-   \"nausea, headache, high blood pressure\"        Patient presents with: Follow-Up from Hospital: [facility]-   \"nausea, headache, high blood pressure\"    He had bp 171/91 at home  He went to ER     He is better now   All along he had only been taking his 120 mg and not the 240 until just this week     Since taking the 240 he is better with bp  He has no c/o now    Here with his wife     No sob no cp no ha now  Bm and urine is fine  Eating well     YOB: 1945    Date of Visit:  8/15/2023     -- Bee Venom -- Swelling    --  Has epi-pen   -- Penicillins -- Hives and Itching   -- Vicodin [Hydrocodone-Acetaminophen] -- Rash    Current Outpatient Medications:  ELIQUIS 5 MG TABS tablet, TAKE 1 TABLET BY MOUTH TWICE DAILY, Disp: 180 tablet, Rfl: 3  hydrOXYzine HCl (ATARAX) 10 MG tablet, Take 1 tablet by mouth nightly as needed for Anxiety (sleep), Disp: 14 tablet, Rfl: 0  sertraline (ZOLOFT) 25 MG tablet, Take 1 tablet by mouth daily, Disp: 30 tablet, Rfl: 3  dilTIAZem (CARDIZEM CD) 240 MG extended release capsule, Take 1 capsule by mouth daily, Disp: 30 capsule, Rfl: 5  losartan (COZAAR) 100 MG tablet, Take 1 tablet by mouth daily, Disp: 90 tablet, Rfl: 2  atorvastatin (LIPITOR) 20 MG tablet, TAKE 1 TABLET BY MOUTH AT BEDTIME, Disp: 90 tablet, Rfl: 2  flecainide (TAMBOCOR) 100 MG tablet, Take 1 tablet by mouth 2 times daily as needed (For symptoms of AFIB lasting longer than 30 mins), Disp: 60 tablet, Rfl: 3  Acetaminophen (TYLENOL PO), Take 650 mg by mouth as needed, Disp: , Rfl:   EPINEPHrine (EPIPEN) 0.3 MG/0.3ML SOAJ injection, Use as directed for allergic reaction, Disp: 1 each, Rfl: 0  hydrocortisone 2.5 % cream, Apply topically 2 times daily.  prn, Disp: 45 g, Rfl: 0  Multiple Vitamin (MULTIVITAMIN) capsule, Take 1 capsule by mouth

## 2023-08-17 ENCOUNTER — TELEPHONE (OUTPATIENT)
Dept: CARDIOLOGY CLINIC | Age: 78
End: 2023-08-17

## 2023-08-17 NOTE — TELEPHONE ENCOUNTER
PT called said his bp was changed 3 days ago and he was calling in his latest bp #'s    8/15 148/74,134/70,132/82  8/16 141/71,135/61,148/74  8/17 164/78,141/69,159/84    Kapil # [phone]

## 2023-08-18 NOTE — TELEPHONE ENCOUNTER
Notified patient to continue current medications. Check BP 1-2 times daily at random times. Keep a log and call our office in the next week. Also notified patient he can call the office to be scheduled for a manual BP reading. He verbalized understanding.

## 2023-09-12 ENCOUNTER — OFFICE VISIT (OUTPATIENT)
Dept: FAMILY MEDICINE CLINIC | Age: 78
End: 2023-09-12
Payer: MEDICARE

## 2023-09-12 VITALS
WEIGHT: 232.8 LBS | DIASTOLIC BLOOD PRESSURE: 82 MMHG | OXYGEN SATURATION: 98 % | HEART RATE: 60 BPM | BODY MASS INDEX: 30.85 KG/M2 | HEIGHT: 73 IN | SYSTOLIC BLOOD PRESSURE: 138 MMHG

## 2023-09-12 VITALS
WEIGHT: 232.8 LBS | BODY MASS INDEX: 30.85 KG/M2 | HEIGHT: 73 IN | TEMPERATURE: 97.5 F | SYSTOLIC BLOOD PRESSURE: 138 MMHG | HEART RATE: 68 BPM | DIASTOLIC BLOOD PRESSURE: 82 MMHG

## 2023-09-12 DIAGNOSIS — I10 HYPERTENSION, ESSENTIAL, BENIGN: Primary | ICD-10-CM

## 2023-09-12 DIAGNOSIS — Z00.00 INITIAL MEDICARE ANNUAL WELLNESS VISIT: Primary | ICD-10-CM

## 2023-09-12 PROCEDURE — G0438 PPPS, INITIAL VISIT: HCPCS | Performed by: FAMILY MEDICINE

## 2023-09-12 PROCEDURE — 3075F SYST BP GE 130 - 139MM HG: CPT | Performed by: FAMILY MEDICINE

## 2023-09-12 PROCEDURE — 3079F DIAST BP 80-89 MM HG: CPT | Performed by: FAMILY MEDICINE

## 2023-09-12 PROCEDURE — 99213 OFFICE O/P EST LOW 20 MIN: CPT | Performed by: FAMILY MEDICINE

## 2023-09-12 PROCEDURE — 1123F ACP DISCUSS/DSCN MKR DOCD: CPT | Performed by: FAMILY MEDICINE

## 2023-09-12 ASSESSMENT — PATIENT HEALTH QUESTIONNAIRE - PHQ9
SUM OF ALL RESPONSES TO PHQ QUESTIONS 1-9: 0
SUM OF ALL RESPONSES TO PHQ QUESTIONS 1-9: 0
2. FEELING DOWN, DEPRESSED OR HOPELESS: 0
1. LITTLE INTEREST OR PLEASURE IN DOING THINGS: 0
SUM OF ALL RESPONSES TO PHQ QUESTIONS 1-9: 0
SUM OF ALL RESPONSES TO PHQ QUESTIONS 1-9: 0
SUM OF ALL RESPONSES TO PHQ9 QUESTIONS 1 & 2: 0

## 2023-09-12 ASSESSMENT — LIFESTYLE VARIABLES
HOW OFTEN DO YOU HAVE A DRINK CONTAINING ALCOHOL: 2-3 TIMES A WEEK
HOW MANY STANDARD DRINKS CONTAINING ALCOHOL DO YOU HAVE ON A TYPICAL DAY: 1 OR 2

## 2023-09-12 NOTE — PROGRESS NOTES
Subjective:      Patient ID: Rhys Saldaña is a 69 y.o. male. Chief Complaint   Patient presents with    1 Month Follow-Up     hypertension        Patient presents with:  1 Month Follow-Up: hypertension    Here for the above and a check   He is overall well and no c/o  He does note a intermittent itch in the groin   Not new and has had in past  He uses a HC cream originally given to him by a derm. It was for a skin ailment on the lower legs   The cream works    He brings in log of bp numbers and they are well     YOB: 1945    Date of Visit:  9/12/2023     -- Bee Venom -- Swelling    --  Has epi-pen   -- Penicillins -- Hives and Itching   -- Vicodin [Hydrocodone-Acetaminophen] -- Rash    Current Outpatient Medications:  ELIQUIS 5 MG TABS tablet, TAKE 1 TABLET BY MOUTH TWICE DAILY, Disp: 180 tablet, Rfl: 3  sertraline (ZOLOFT) 25 MG tablet, Take 1 tablet by mouth daily, Disp: 30 tablet, Rfl: 3  dilTIAZem (CARDIZEM CD) 240 MG extended release capsule, Take 1 capsule by mouth daily, Disp: 30 capsule, Rfl: 5  losartan (COZAAR) 100 MG tablet, Take 1 tablet by mouth daily, Disp: 90 tablet, Rfl: 2  atorvastatin (LIPITOR) 20 MG tablet, TAKE 1 TABLET BY MOUTH AT BEDTIME, Disp: 90 tablet, Rfl: 2  flecainide (TAMBOCOR) 100 MG tablet, Take 1 tablet by mouth 2 times daily as needed (For symptoms of AFIB lasting longer than 30 mins) (Patient not taking: Reported on 9/12/2023), Disp: 60 tablet, Rfl: 3  Acetaminophen (TYLENOL PO), Take 650 mg by mouth as needed, Disp: , Rfl:   EPINEPHrine (EPIPEN) 0.3 MG/0.3ML SOAJ injection, Use as directed for allergic reaction, Disp: 1 each, Rfl: 0  hydrocortisone 2.5 % cream, Apply topically 2 times daily.  prn, Disp: 45 g, Rfl: 0  Multiple Vitamin (MULTIVITAMIN) capsule, Take 1 capsule by mouth daily, Disp: , Rfl:     No current facility-administered medications for this visit.      -----------------------------------                   09/12/23

## 2023-09-12 NOTE — PATIENT INSTRUCTIONS
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 You will need a ultrasound scan in 3 to 4 years.  The original scan that showed the concern was July of 2022   See me back in about 4 months

## 2023-09-12 NOTE — PATIENT INSTRUCTIONS
Personalized Preventive Plan for Eva So - 9/12/2023  Medicare offers a range of preventive health benefits. Some of the tests and screenings are paid in full while other may be subject to a deductible, co-insurance, and/or copay. Some of these benefits include a comprehensive review of your medical history including lifestyle, illnesses that may run in your family, and various assessments and screenings as appropriate. After reviewing your medical record and screening and assessments performed today your provider may have ordered immunizations, labs, imaging, and/or referrals for you. A list of these orders (if applicable) as well as your Preventive Care list are included within your After Visit Summary for your review. Other Preventive Recommendations:    A preventive eye exam performed by an eye specialist is recommended every 1-2 years to screen for glaucoma; cataracts, macular degeneration, and other eye disorders. A preventive dental visit is recommended every 6 months. Try to get at least 150 minutes of exercise per week or 10,000 steps per day on a pedometer . Order or download the FREE \"Exercise & Physical Activity: Your Everyday Guide\" from [employer]. Call 6-576.359.2756 or search [employer] online. You need 2747-7532 mg of calcium and 4834-9990 IU of vitamin D per day. It is possible to meet your calcium requirement with diet alone, but a vitamin D supplement is usually necessary to meet this goal.  When exposed to the sun, use a sunscreen that protects against both UVA and UVB radiation with an SPF of 30 or greater. Reapply every 2 to 3 hours or after sweating, drying off with a towel, or swimming. Always wear a seat belt when traveling in a car. Always wear a helmet when riding a bicycle or motorcycle.

## 2023-09-15 ENCOUNTER — TELEPHONE (OUTPATIENT)
Dept: FAMILY MEDICINE CLINIC | Age: 78
End: 2023-09-15

## 2023-09-15 RX ORDER — LOSARTAN POTASSIUM 100 MG/1
100 TABLET ORAL DAILY
Qty: 90 TABLET | Refills: 1 | Status: SHIPPED | OUTPATIENT
Start: 2023-09-15

## 2023-09-15 NOTE — TELEPHONE ENCOUNTER
Patient is having hard time getting the losartan 100 mg from [facility]. They had him still on the 50 mg.  He was told you need to send new script for Losartan 100 mg

## 2023-09-18 ENCOUNTER — OFFICE VISIT (OUTPATIENT)
Dept: SLEEP MEDICINE | Age: 78
End: 2023-09-18
Payer: MEDICARE

## 2023-09-18 VITALS
WEIGHT: 225 LBS | HEART RATE: 65 BPM | BODY MASS INDEX: 29.82 KG/M2 | HEIGHT: 73 IN | SYSTOLIC BLOOD PRESSURE: 135 MMHG | DIASTOLIC BLOOD PRESSURE: 80 MMHG | TEMPERATURE: 98.3 F | RESPIRATION RATE: 18 BRPM | OXYGEN SATURATION: 99 %

## 2023-09-18 DIAGNOSIS — Z86.79 HISTORY OF ATRIAL FIBRILLATION: ICD-10-CM

## 2023-09-18 DIAGNOSIS — G47.33 OSA (OBSTRUCTIVE SLEEP APNEA): Primary | ICD-10-CM

## 2023-09-18 DIAGNOSIS — I10 ESSENTIAL HYPERTENSION: ICD-10-CM

## 2023-09-18 DIAGNOSIS — R06.83 SNORING: ICD-10-CM

## 2023-09-18 PROCEDURE — 99204 OFFICE O/P NEW MOD 45 MIN: CPT | Performed by: PSYCHIATRY & NEUROLOGY

## 2023-09-18 PROCEDURE — 1123F ACP DISCUSS/DSCN MKR DOCD: CPT | Performed by: PSYCHIATRY & NEUROLOGY

## 2023-09-18 PROCEDURE — 3079F DIAST BP 80-89 MM HG: CPT | Performed by: PSYCHIATRY & NEUROLOGY

## 2023-09-18 PROCEDURE — 3075F SYST BP GE 130 - 139MM HG: CPT | Performed by: PSYCHIATRY & NEUROLOGY

## 2023-09-18 ASSESSMENT — SLEEP AND FATIGUE QUESTIONNAIRES
HOW LIKELY ARE YOU TO NOD OFF OR FALL ASLEEP IN A CAR, WHILE STOPPED FOR A FEW MINUTES IN TRAFFIC: 0
HOW LIKELY ARE YOU TO NOD OFF OR FALL ASLEEP WHILE LYING DOWN TO REST IN THE AFTERNOON WHEN CIRCUMSTANCES PERMIT: 2
HOW LIKELY ARE YOU TO NOD OFF OR FALL ASLEEP WHILE SITTING QUIETLY AFTER LUNCH WITHOUT ALCOHOL: 0
NECK CIRCUMFERENCE (INCHES): 18.5
HOW LIKELY ARE YOU TO NOD OFF OR FALL ASLEEP WHEN YOU ARE A PASSENGER IN A CAR FOR AN HOUR WITHOUT A BREAK: 0
HOW LIKELY ARE YOU TO NOD OFF OR FALL ASLEEP WHILE SITTING AND READING: 1
HOW LIKELY ARE YOU TO NOD OFF OR FALL ASLEEP WHILE SITTING AND TALKING TO SOMEONE: 0
HOW LIKELY ARE YOU TO NOD OFF OR FALL ASLEEP WHILE SITTING INACTIVE IN A PUBLIC PLACE: 0
HOW LIKELY ARE YOU TO NOD OFF OR FALL ASLEEP WHILE WATCHING TV: 2
ESS TOTAL SCORE: 5

## 2023-09-18 ASSESSMENT — ENCOUNTER SYMPTOMS
ALLERGIC/IMMUNOLOGIC NEGATIVE: 1
RESPIRATORY NEGATIVE: 1
EYES NEGATIVE: 1
GASTROINTESTINAL NEGATIVE: 1

## 2023-09-18 NOTE — PATIENT INSTRUCTIONS
Orders Placed This Encounter   Procedures    Home Sleep Study     Standing Status:   Future     Standing Expiration Date:   9/18/2024     Order Specific Question:   Location For Sleep Study     Answer:   [place]     Order Specific Question:   Select Sleep Lab Location     Answer:   [facility]

## 2023-09-28 ENCOUNTER — HOSPITAL ENCOUNTER (OUTPATIENT)
Dept: SLEEP CENTER | Age: 78
Discharge: HOME | End: 2023-09-28
Attending: PSYCHIATRY & NEUROLOGY
Payer: MEDICARE

## 2023-09-28 DIAGNOSIS — G47.33 OSA (OBSTRUCTIVE SLEEP APNEA): ICD-10-CM

## 2023-09-28 PROCEDURE — 95806 SLEEP STUDY UNATT&RESP EFFT: CPT

## 2023-10-02 PROBLEM — G47.33 OSA (OBSTRUCTIVE SLEEP APNEA): Status: ACTIVE | Noted: 2023-10-02

## 2023-10-03 ENCOUNTER — TELEPHONE (OUTPATIENT)
Dept: PULMONOLOGY | Age: 78
End: 2023-10-03

## 2023-10-03 NOTE — TELEPHONE ENCOUNTER
[address] [place] [id number]    Would rather have equipments set up in office   Need at least a day or two notice     Can be set up in office.

## 2023-10-03 NOTE — TELEPHONE ENCOUNTER
HOME Sleep study showed moderate JOELLEN. AHI was 16.4  per hr. And O2 Desaturations to 87%. Dr Emmanuel Washburn: Follow up with the patient's sleep physician to discuss results  APAP between 5 and 15 cm   Avoid sedatives, alcohol and caffeinated drinks at bedtime. Avoid driving while sleepy. The patient has been notified of this information and all questions answered.   He will call back with DME

## 2023-11-16 ENCOUNTER — TELEPHONE (OUTPATIENT)
Dept: CARDIOLOGY CLINIC | Age: 78
End: 2023-11-16

## 2023-11-20 RX ORDER — SERTRALINE HYDROCHLORIDE 25 MG/1
25 TABLET, FILM COATED ORAL DAILY
Qty: 30 TABLET | Refills: 3 | Status: SHIPPED | OUTPATIENT
Start: 2023-11-20 | End: 2023-11-21 | Stop reason: SDUPTHER

## 2023-11-21 RX ORDER — SERTRALINE HYDROCHLORIDE 25 MG/1
25 TABLET, FILM COATED ORAL DAILY
Qty: 30 TABLET | Refills: 3 | Status: SHIPPED | OUTPATIENT
Start: 2023-11-21

## 2023-11-22 ENCOUNTER — HOSPITAL ENCOUNTER (OUTPATIENT)
Age: 78
Discharge: HOME | End: 2023-11-22
Payer: MEDICARE

## 2023-11-22 DIAGNOSIS — I48.0 PAF (PAROXYSMAL ATRIAL FIBRILLATION) (HCC): ICD-10-CM

## 2023-11-22 LAB
ABO + RH BLD: NORMAL
ANION GAP SERPL CALCULATED.3IONS-SCNC: 7 MMOL/L (ref 3–16)
BLD GP AB SCN SERPL QL: NORMAL
BUN SERPL-MCNC: 14 MG/DL (ref 7–20)
CALCIUM SERPL-MCNC: 9.7 MG/DL (ref 8.3–10.6)
CHLORIDE SERPL-SCNC: 104 MMOL/L (ref 99–110)
CO2 SERPL-SCNC: 26 MMOL/L (ref 21–32)
CREAT SERPL-MCNC: 0.8 MG/DL (ref 0.8–1.3)
DEPRECATED RDW RBC AUTO: 13.7 % (ref 12.4–15.4)
GFR SERPLBLD CREATININE-BSD FMLA CKD-EPI: >60 ML/MIN/{1.73_M2}
GLUCOSE SERPL-MCNC: 123 MG/DL (ref 70–99)
HCT VFR BLD AUTO: 43.6 % (ref 40.5–52.5)
HGB BLD-MCNC: 15 G/DL (ref 13.5–17.5)
MCH RBC QN AUTO: 33.3 PG (ref 26–34)
MCHC RBC AUTO-ENTMCNC: 34.3 G/DL (ref 31–36)
MCV RBC AUTO: 96.9 FL (ref 80–100)
PLATELET # BLD AUTO: 141 K/UL (ref 135–450)
PMV BLD AUTO: 8.3 FL (ref 5–10.5)
POTASSIUM SERPL-SCNC: 4.2 MMOL/L (ref 3.5–5.1)
RBC # BLD AUTO: 4.5 M/UL (ref 4.2–5.9)
SODIUM SERPL-SCNC: 137 MMOL/L (ref 136–145)
WBC # BLD AUTO: 5 K/UL (ref 4–11)

## 2023-11-22 PROCEDURE — 85027 COMPLETE CBC AUTOMATED: CPT

## 2023-11-22 PROCEDURE — 86901 BLOOD TYPING SEROLOGIC RH(D): CPT

## 2023-11-22 PROCEDURE — 36415 COLL VENOUS BLD VENIPUNCTURE: CPT

## 2023-11-22 PROCEDURE — 86850 RBC ANTIBODY SCREEN: CPT

## 2023-11-22 PROCEDURE — 80048 BASIC METABOLIC PNL TOTAL CA: CPT

## 2023-11-22 PROCEDURE — 86900 BLOOD TYPING SEROLOGIC ABO: CPT

## 2023-11-27 RX ORDER — DILTIAZEM HYDROCHLORIDE 240 MG/1
240 CAPSULE, COATED, EXTENDED RELEASE ORAL DAILY
Qty: 30 CAPSULE | Refills: 3 | Status: SHIPPED | OUTPATIENT
Start: 2023-11-27

## 2023-11-30 ENCOUNTER — HOSPITAL ENCOUNTER (OUTPATIENT)
Dept: CARDIAC CATH/INVASIVE PROCEDURES | Age: 78
Discharge: HOME | End: 2023-11-30
Attending: INTERNAL MEDICINE | Admitting: INTERNAL MEDICINE
Payer: MEDICARE

## 2023-11-30 ENCOUNTER — ANESTHESIA (OUTPATIENT)
Dept: CARDIAC CATH/INVASIVE PROCEDURES | Age: 78
End: 2023-11-30
Payer: MEDICARE

## 2023-11-30 ENCOUNTER — ANESTHESIA EVENT (OUTPATIENT)
Dept: CARDIAC CATH/INVASIVE PROCEDURES | Age: 78
End: 2023-11-30
Payer: MEDICARE

## 2023-11-30 VITALS
BODY MASS INDEX: 29.82 KG/M2 | WEIGHT: 225 LBS | HEART RATE: 89 BPM | HEIGHT: 73 IN | RESPIRATION RATE: 19 BRPM | OXYGEN SATURATION: 98 % | TEMPERATURE: 98 F | SYSTOLIC BLOOD PRESSURE: 153 MMHG | DIASTOLIC BLOOD PRESSURE: 93 MMHG

## 2023-11-30 DIAGNOSIS — I48.0 PAF (PAROXYSMAL ATRIAL FIBRILLATION) (HCC): ICD-10-CM

## 2023-11-30 LAB
ABO + RH BLD: NORMAL
ALBUMIN SERPL-MCNC: 4.7 G/DL (ref 3.4–5)
ALBUMIN/GLOB SERPL: 1.5 {RATIO} (ref 1.1–2.2)
ALP SERPL-CCNC: 76 U/L (ref 40–129)
ALT SERPL-CCNC: 27 U/L (ref 10–40)
ANION GAP SERPL CALCULATED.3IONS-SCNC: 10 MMOL/L (ref 3–16)
AST SERPL-CCNC: 18 U/L (ref 15–37)
BILIRUB SERPL-MCNC: 1.2 MG/DL (ref 0–1)
BLD GP AB SCN SERPL QL: NORMAL
BUN SERPL-MCNC: 17 MG/DL (ref 7–20)
CALCIUM SERPL-MCNC: 9.6 MG/DL (ref 8.3–10.6)
CHLORIDE SERPL-SCNC: 103 MMOL/L (ref 99–110)
CO2 SERPL-SCNC: 23 MMOL/L (ref 21–32)
CREAT SERPL-MCNC: 0.8 MG/DL (ref 0.8–1.3)
DEPRECATED RDW RBC AUTO: 13.7 % (ref 12.4–15.4)
EKG ATRIAL RATE: 84 BPM
EKG ATRIAL RATE: 88 BPM
EKG DIAGNOSIS: NORMAL
EKG DIAGNOSIS: NORMAL
EKG P AXIS: 49 DEGREES
EKG P AXIS: 55 DEGREES
EKG P-R INTERVAL: 196 MS
EKG P-R INTERVAL: 212 MS
EKG Q-T INTERVAL: 364 MS
EKG Q-T INTERVAL: 378 MS
EKG QRS DURATION: 110 MS
EKG QRS DURATION: 114 MS
EKG QTC CALCULATION (BAZETT): 430 MS
EKG QTC CALCULATION (BAZETT): 457 MS
EKG R AXIS: -17 DEGREES
EKG R AXIS: -24 DEGREES
EKG T AXIS: 48 DEGREES
EKG T AXIS: 49 DEGREES
EKG VENTRICULAR RATE: 84 BPM
EKG VENTRICULAR RATE: 88 BPM
GFR SERPLBLD CREATININE-BSD FMLA CKD-EPI: >60 ML/MIN/{1.73_M2}
GLUCOSE SERPL-MCNC: 131 MG/DL (ref 70–99)
HCT VFR BLD AUTO: 44.2 % (ref 40.5–52.5)
HGB BLD-MCNC: 14.9 G/DL (ref 13.5–17.5)
MAGNESIUM SERPL-MCNC: 2.2 MG/DL (ref 1.8–2.4)
MCH RBC QN AUTO: 32.6 PG (ref 26–34)
MCHC RBC AUTO-ENTMCNC: 33.6 G/DL (ref 31–36)
MCV RBC AUTO: 97.2 FL (ref 80–100)
PLATELET # BLD AUTO: 158 K/UL (ref 135–450)
PMV BLD AUTO: 7.3 FL (ref 5–10.5)
POC ACT LR: 241 SEC
POC ACT LR: 337 SEC
POC ACT LR: >400 SEC
POTASSIUM SERPL-SCNC: 4 MMOL/L (ref 3.5–5.1)
PROT SERPL-MCNC: 7.8 G/DL (ref 6.4–8.2)
RBC # BLD AUTO: 4.55 M/UL (ref 4.2–5.9)
SODIUM SERPL-SCNC: 136 MMOL/L (ref 136–145)
WBC # BLD AUTO: 5.7 K/UL (ref 4–11)

## 2023-11-30 PROCEDURE — 7100000010 HC PHASE II RECOVERY - FIRST 15 MIN

## 2023-11-30 PROCEDURE — 86850 RBC ANTIBODY SCREEN: CPT

## 2023-11-30 PROCEDURE — 2500000003 HC RX 250 WO HCPCS

## 2023-11-30 PROCEDURE — 93656 COMPRE EP EVAL ABLTJ ATR FIB: CPT

## 2023-11-30 PROCEDURE — 2580000003 HC RX 258: Performed by: NURSE ANESTHETIST, CERTIFIED REGISTERED

## 2023-11-30 PROCEDURE — 93623 PRGRMD STIMJ&PACG IV RX NFS: CPT

## 2023-11-30 PROCEDURE — 93656 COMPRE EP EVAL ABLTJ ATR FIB: CPT | Performed by: INTERNAL MEDICINE

## 2023-11-30 PROCEDURE — 83735 ASSAY OF MAGNESIUM: CPT

## 2023-11-30 PROCEDURE — C1760 CLOSURE DEV, VASC: HCPCS

## 2023-11-30 PROCEDURE — 86900 BLOOD TYPING SEROLOGIC ABO: CPT

## 2023-11-30 PROCEDURE — 93005 ELECTROCARDIOGRAM TRACING: CPT | Performed by: INTERNAL MEDICINE

## 2023-11-30 PROCEDURE — 6360000002 HC RX W HCPCS

## 2023-11-30 PROCEDURE — 3700000001 HC ADD 15 MINUTES (ANESTHESIA)

## 2023-11-30 PROCEDURE — 93623 PRGRMD STIMJ&PACG IV RX NFS: CPT | Performed by: INTERNAL MEDICINE

## 2023-11-30 PROCEDURE — C1894 INTRO/SHEATH, NON-LASER: HCPCS

## 2023-11-30 PROCEDURE — C1766 INTRO/SHEATH,STRBLE,NON-PEEL: HCPCS

## 2023-11-30 PROCEDURE — 36415 COLL VENOUS BLD VENIPUNCTURE: CPT

## 2023-11-30 PROCEDURE — 7100000000 HC PACU RECOVERY - FIRST 15 MIN

## 2023-11-30 PROCEDURE — 2500000003 HC RX 250 WO HCPCS: Performed by: NURSE ANESTHETIST, CERTIFIED REGISTERED

## 2023-11-30 PROCEDURE — C1769 GUIDE WIRE: HCPCS

## 2023-11-30 PROCEDURE — 7100000001 HC PACU RECOVERY - ADDTL 15 MIN

## 2023-11-30 PROCEDURE — 3700000000 HC ANESTHESIA ATTENDED CARE

## 2023-11-30 PROCEDURE — 7100000011 HC PHASE II RECOVERY - ADDTL 15 MIN

## 2023-11-30 PROCEDURE — 2580000003 HC RX 258

## 2023-11-30 PROCEDURE — 93010 ELECTROCARDIOGRAM REPORT: CPT | Performed by: INTERNAL MEDICINE

## 2023-11-30 PROCEDURE — 93622 COMP EP EVAL L VENTR PAC&REC: CPT | Performed by: INTERNAL MEDICINE

## 2023-11-30 PROCEDURE — 85347 COAGULATION TIME ACTIVATED: CPT

## 2023-11-30 PROCEDURE — 85027 COMPLETE CBC AUTOMATED: CPT

## 2023-11-30 PROCEDURE — C1730 CATH, EP, 19 OR FEW ELECT: HCPCS

## 2023-11-30 PROCEDURE — 93622 COMP EP EVAL L VENTR PAC&REC: CPT

## 2023-11-30 PROCEDURE — C1759 CATH, INTRA ECHOCARDIOGRAPHY: HCPCS

## 2023-11-30 PROCEDURE — 80053 COMPREHEN METABOLIC PANEL: CPT

## 2023-11-30 PROCEDURE — 86901 BLOOD TYPING SEROLOGIC RH(D): CPT

## 2023-11-30 PROCEDURE — 93005 ELECTROCARDIOGRAM TRACING: CPT

## 2023-11-30 PROCEDURE — 6360000002 HC RX W HCPCS: Performed by: NURSE ANESTHETIST, CERTIFIED REGISTERED

## 2023-11-30 PROCEDURE — C1732 CATH, EP, DIAG/ABL, 3D/VECT: HCPCS

## 2023-11-30 RX ORDER — PROTAMINE SULFATE 10 MG/ML
INJECTION, SOLUTION INTRAVENOUS PRN
Status: DISCONTINUED | OUTPATIENT
Start: 2023-11-30 | End: 2023-11-30 | Stop reason: SDUPTHER

## 2023-11-30 RX ORDER — HYDRALAZINE HYDROCHLORIDE 20 MG/ML
10 INJECTION INTRAMUSCULAR; INTRAVENOUS
Status: DISCONTINUED | OUTPATIENT
Start: 2023-11-30 | End: 2023-11-30 | Stop reason: HOSPADM

## 2023-11-30 RX ORDER — SODIUM CHLORIDE 9 MG/ML
INJECTION, SOLUTION INTRAVENOUS CONTINUOUS PRN
Status: DISCONTINUED | OUTPATIENT
Start: 2023-11-30 | End: 2023-11-30 | Stop reason: SDUPTHER

## 2023-11-30 RX ORDER — PROPOFOL 10 MG/ML
INJECTION, EMULSION INTRAVENOUS PRN
Status: DISCONTINUED | OUTPATIENT
Start: 2023-11-30 | End: 2023-11-30 | Stop reason: SDUPTHER

## 2023-11-30 RX ORDER — LIDOCAINE HYDROCHLORIDE 20 MG/ML
INJECTION, SOLUTION INFILTRATION; PERINEURAL PRN
Status: DISCONTINUED | OUTPATIENT
Start: 2023-11-30 | End: 2023-11-30 | Stop reason: SDUPTHER

## 2023-11-30 RX ORDER — PANTOPRAZOLE SODIUM 40 MG/1
40 TABLET, DELAYED RELEASE ORAL
Qty: 30 TABLET | Refills: 0 | Status: SHIPPED | OUTPATIENT
Start: 2023-11-30

## 2023-11-30 RX ORDER — SUCCINYLCHOLINE CHLORIDE 20 MG/ML
INJECTION INTRAMUSCULAR; INTRAVENOUS PRN
Status: DISCONTINUED | OUTPATIENT
Start: 2023-11-30 | End: 2023-11-30 | Stop reason: SDUPTHER

## 2023-11-30 RX ORDER — FUROSEMIDE 10 MG/ML
INJECTION INTRAMUSCULAR; INTRAVENOUS PRN
Status: DISCONTINUED | OUTPATIENT
Start: 2023-11-30 | End: 2023-11-30 | Stop reason: SDUPTHER

## 2023-11-30 RX ORDER — SODIUM CHLORIDE 0.9 % (FLUSH) 0.9 %
5-40 SYRINGE (ML) INJECTION PRN
Status: DISCONTINUED | OUTPATIENT
Start: 2023-11-30 | End: 2023-11-30 | Stop reason: HOSPADM

## 2023-11-30 RX ORDER — MEPERIDINE HYDROCHLORIDE 25 MG/ML
12.5 INJECTION INTRAMUSCULAR; INTRAVENOUS; SUBCUTANEOUS
Status: DISCONTINUED | OUTPATIENT
Start: 2023-11-30 | End: 2023-11-30 | Stop reason: HOSPADM

## 2023-11-30 RX ORDER — LABETALOL HYDROCHLORIDE 5 MG/ML
10 INJECTION, SOLUTION INTRAVENOUS
Status: DISCONTINUED | OUTPATIENT
Start: 2023-11-30 | End: 2023-11-30 | Stop reason: HOSPADM

## 2023-11-30 RX ORDER — SODIUM CHLORIDE 9 MG/ML
INJECTION, SOLUTION INTRAVENOUS PRN
Status: DISCONTINUED | OUTPATIENT
Start: 2023-11-30 | End: 2023-11-30 | Stop reason: HOSPADM

## 2023-11-30 RX ORDER — HEPARIN SODIUM 1000 [USP'U]/ML
INJECTION, SOLUTION INTRAVENOUS; SUBCUTANEOUS PRN
Status: DISCONTINUED | OUTPATIENT
Start: 2023-11-30 | End: 2023-11-30 | Stop reason: SDUPTHER

## 2023-11-30 RX ORDER — DEXAMETHASONE SODIUM PHOSPHATE 4 MG/ML
INJECTION, SOLUTION INTRA-ARTICULAR; INTRALESIONAL; INTRAMUSCULAR; INTRAVENOUS; SOFT TISSUE PRN
Status: DISCONTINUED | OUTPATIENT
Start: 2023-11-30 | End: 2023-11-30 | Stop reason: SDUPTHER

## 2023-11-30 RX ORDER — FENTANYL CITRATE 50 UG/ML
25 INJECTION, SOLUTION INTRAMUSCULAR; INTRAVENOUS EVERY 5 MIN PRN
Status: DISCONTINUED | OUTPATIENT
Start: 2023-11-30 | End: 2023-11-30 | Stop reason: HOSPADM

## 2023-11-30 RX ORDER — SODIUM CHLORIDE 0.9 % (FLUSH) 0.9 %
5-40 SYRINGE (ML) INJECTION EVERY 12 HOURS SCHEDULED
Status: DISCONTINUED | OUTPATIENT
Start: 2023-11-30 | End: 2023-11-30 | Stop reason: HOSPADM

## 2023-11-30 RX ORDER — FENTANYL CITRATE 50 UG/ML
INJECTION, SOLUTION INTRAMUSCULAR; INTRAVENOUS PRN
Status: DISCONTINUED | OUTPATIENT
Start: 2023-11-30 | End: 2023-11-30 | Stop reason: SDUPTHER

## 2023-11-30 RX ORDER — GLYCOPYRROLATE 0.2 MG/ML
INJECTION INTRAMUSCULAR; INTRAVENOUS PRN
Status: DISCONTINUED | OUTPATIENT
Start: 2023-11-30 | End: 2023-11-30 | Stop reason: SDUPTHER

## 2023-11-30 RX ORDER — DIPHENHYDRAMINE HYDROCHLORIDE 50 MG/ML
12.5 INJECTION INTRAMUSCULAR; INTRAVENOUS
Status: DISCONTINUED | OUTPATIENT
Start: 2023-11-30 | End: 2023-11-30 | Stop reason: HOSPADM

## 2023-11-30 RX ORDER — HYDROMORPHONE HYDROCHLORIDE 2 MG/ML
0.5 INJECTION, SOLUTION INTRAMUSCULAR; INTRAVENOUS; SUBCUTANEOUS EVERY 5 MIN PRN
Status: DISCONTINUED | OUTPATIENT
Start: 2023-11-30 | End: 2023-11-30 | Stop reason: HOSPADM

## 2023-11-30 RX ORDER — OXYCODONE HYDROCHLORIDE 5 MG/1
5 TABLET ORAL
Status: DISCONTINUED | OUTPATIENT
Start: 2023-11-30 | End: 2023-11-30 | Stop reason: HOSPADM

## 2023-11-30 RX ORDER — ROCURONIUM BROMIDE 10 MG/ML
INJECTION, SOLUTION INTRAVENOUS PRN
Status: DISCONTINUED | OUTPATIENT
Start: 2023-11-30 | End: 2023-11-30 | Stop reason: SDUPTHER

## 2023-11-30 RX ORDER — FAMOTIDINE 10 MG/ML
INJECTION, SOLUTION INTRAVENOUS PRN
Status: DISCONTINUED | OUTPATIENT
Start: 2023-11-30 | End: 2023-11-30 | Stop reason: SDUPTHER

## 2023-11-30 RX ORDER — ONDANSETRON 2 MG/ML
4 INJECTION INTRAMUSCULAR; INTRAVENOUS
Status: DISCONTINUED | OUTPATIENT
Start: 2023-11-30 | End: 2023-11-30 | Stop reason: HOSPADM

## 2023-11-30 RX ORDER — PROCHLORPERAZINE EDISYLATE 5 MG/ML
5 INJECTION INTRAMUSCULAR; INTRAVENOUS
Status: DISCONTINUED | OUTPATIENT
Start: 2023-11-30 | End: 2023-11-30 | Stop reason: HOSPADM

## 2023-11-30 RX ORDER — ONDANSETRON 2 MG/ML
INJECTION INTRAMUSCULAR; INTRAVENOUS PRN
Status: DISCONTINUED | OUTPATIENT
Start: 2023-11-30 | End: 2023-11-30 | Stop reason: SDUPTHER

## 2023-11-30 RX ADMIN — GLYCOPYRROLATE 0.2 MG: 0.2 INJECTION, SOLUTION INTRAMUSCULAR; INTRAVENOUS at 07:33

## 2023-11-30 RX ADMIN — LIDOCAINE HYDROCHLORIDE 100 MG: 20 INJECTION, SOLUTION INFILTRATION; PERINEURAL at 07:38

## 2023-11-30 RX ADMIN — FUROSEMIDE 5 MG: 10 INJECTION, SOLUTION INTRAMUSCULAR; INTRAVENOUS at 08:50

## 2023-11-30 RX ADMIN — PHENYLEPHRINE HYDROCHLORIDE 100 MCG: 10 INJECTION INTRAVENOUS at 07:56

## 2023-11-30 RX ADMIN — PHENYLEPHRINE HYDROCHLORIDE 100 MCG: 10 INJECTION INTRAVENOUS at 07:51

## 2023-11-30 RX ADMIN — SUGAMMADEX 400 MG: 100 INJECTION, SOLUTION INTRAVENOUS at 09:43

## 2023-11-30 RX ADMIN — PHENYLEPHRINE HYDROCHLORIDE 100 MCG: 10 INJECTION INTRAVENOUS at 08:15

## 2023-11-30 RX ADMIN — FAMOTIDINE 20 MG: 10 INJECTION INTRAVENOUS at 07:30

## 2023-11-30 RX ADMIN — SODIUM CHLORIDE: 9 INJECTION, SOLUTION INTRAVENOUS at 08:17

## 2023-11-30 RX ADMIN — FUROSEMIDE 5 MG: 10 INJECTION, SOLUTION INTRAMUSCULAR; INTRAVENOUS at 08:35

## 2023-11-30 RX ADMIN — SODIUM CHLORIDE: 9 INJECTION, SOLUTION INTRAVENOUS at 07:27

## 2023-11-30 RX ADMIN — FUROSEMIDE 5 MG: 10 INJECTION, SOLUTION INTRAMUSCULAR; INTRAVENOUS at 09:20

## 2023-11-30 RX ADMIN — PHENYLEPHRINE HYDROCHLORIDE 100 MCG: 10 INJECTION INTRAVENOUS at 07:46

## 2023-11-30 RX ADMIN — PHENYLEPHRINE HYDROCHLORIDE 100 MCG/MIN: 10 INJECTION INTRAVENOUS at 07:45

## 2023-11-30 RX ADMIN — FUROSEMIDE 5 MG: 10 INJECTION, SOLUTION INTRAMUSCULAR; INTRAVENOUS at 09:05

## 2023-11-30 RX ADMIN — SUCCINYLCHOLINE CHLORIDE 200 MG: 20 INJECTION, SOLUTION INTRAMUSCULAR; INTRAVENOUS at 07:39

## 2023-11-30 RX ADMIN — PROTAMINE SULFATE 20 MG: 10 INJECTION, SOLUTION INTRAVENOUS at 09:40

## 2023-11-30 RX ADMIN — PHENYLEPHRINE HYDROCHLORIDE 100 MCG: 10 INJECTION INTRAVENOUS at 08:10

## 2023-11-30 RX ADMIN — SODIUM CHLORIDE: 9 INJECTION, SOLUTION INTRAVENOUS at 08:35

## 2023-11-30 RX ADMIN — PHENYLEPHRINE HYDROCHLORIDE 100 MCG: 10 INJECTION INTRAVENOUS at 08:33

## 2023-11-30 RX ADMIN — ISOPROTERENOL HYDROCHLORIDE 2 MCG/MIN: 0.2 INJECTION, SOLUTION INTRAMUSCULAR; INTRAVENOUS at 09:30

## 2023-11-30 RX ADMIN — ONDANSETRON 4 MG: 2 INJECTION INTRAMUSCULAR; INTRAVENOUS at 07:54

## 2023-11-30 RX ADMIN — PROPOFOL 200 MG: 10 INJECTION, EMULSION INTRAVENOUS at 07:38

## 2023-11-30 RX ADMIN — PHENYLEPHRINE HYDROCHLORIDE 100 MCG: 10 INJECTION INTRAVENOUS at 08:23

## 2023-11-30 RX ADMIN — HEPARIN SODIUM 6000 UNITS: 1000 INJECTION INTRAVENOUS; SUBCUTANEOUS at 08:12

## 2023-11-30 RX ADMIN — DEXAMETHASONE SODIUM PHOSPHATE 4 MG: 4 INJECTION, SOLUTION INTRAMUSCULAR; INTRAVENOUS at 07:51

## 2023-11-30 RX ADMIN — ROCURONIUM BROMIDE 50 MG: 10 INJECTION, SOLUTION INTRAVENOUS at 07:38

## 2023-11-30 RX ADMIN — HEPARIN SODIUM 6000 UNITS: 1000 INJECTION INTRAVENOUS; SUBCUTANEOUS at 08:23

## 2023-11-30 RX ADMIN — FENTANYL CITRATE 50 MCG: 50 INJECTION, SOLUTION INTRAMUSCULAR; INTRAVENOUS at 08:04

## 2023-11-30 RX ADMIN — FENTANYL CITRATE 50 MCG: 50 INJECTION, SOLUTION INTRAMUSCULAR; INTRAVENOUS at 07:38

## 2023-11-30 ASSESSMENT — LIFESTYLE VARIABLES: SMOKING_STATUS: 0

## 2023-11-30 NOTE — ANESTHESIA PRE PROCEDURE
Department of Anesthesiology  Preprocedure Note       Name:  Gerhardt Fairy   Age:  69 y.o.  :  1945                                          MRN:  [id number]         Date:  2023      Surgeon: * Surgery not found *    Procedure:     Medications prior to admission:   Prior to Admission medications    Medication Sig Start Date End Date Taking? Authorizing Provider   dilTIAZem (CARDIZEM CD) 240 MG extended release capsule Take 1 capsule by mouth daily 23   Raimundo Harrell MD   sertraline (ZOLOFT) 25 MG tablet Take 1 tablet by mouth daily 23   Raimundo Harrell MD   losartan (COZAAR) 100 MG tablet Take 1 tablet by mouth daily 9/15/23   Raimundo Harrell MD   ELIQUIS 5 MG TABS tablet TAKE 1 TABLET BY MOUTH TWICE DAILY 23   Morena Caldera MD   atorvastatin (LIPITOR) 20 MG tablet TAKE 1 TABLET BY MOUTH AT BEDTIME 23   Raimundo Harrell MD   flecainide (TAMBOCOR) 100 MG tablet Take 1 tablet by mouth 2 times daily as needed (For symptoms of AFIB lasting longer than 30 mins)  Patient not taking: Reported on 2023   Morena Caldera MD   Acetaminophen (TYLENOL PO) Take 650 mg by mouth as needed    ProviderRio MD   EPINEPHrine (EPIPEN) 0.3 MG/0.3ML SOAJ injection Use as directed for allergic reaction  Patient not taking: Reported on 2023   Raimundo Harrell MD   hydrocortisone 2.5 % cream Apply topically 2 times daily. prn 20   Raimundo Harrell MD   Multiple Vitamin (MULTIVITAMIN) capsule Take 1 capsule by mouth daily    Provider, MD Rio       Current medications:    Current Facility-Administered Medications   Medication Dose Route Frequency Provider Last Rate Last Admin    sodium chloride flush 0.9 % injection 5-40 mL  5-40 mL IntraVENous PRN Morena Caldera MD           Allergies:     Allergies   Allergen Reactions    Bee Venom Swelling     Has epi-pen    Penicillins Hives and Itching    Vicodin [Hydrocodone-Acetaminophen] Rash       Problem List:    Patient Active

## 2023-11-30 NOTE — PROCEDURES
[facility]     Electrophysiology Procedure Note       Date of Procedure: 11/30/2023  Patient's Name: Alanna Burt  YOB: 1945   Medical Record Number: [id number]  Referring Physician: Ravinder Kenny MD  Procedure Performed by: Tomy Vieyra MD    Procedure performed:  Electrophysiology study with radiofrequency ablation of atrial fibrillation and pulmonary vein isolation   3-D electroanatomical mapping of the left atrium    Transseptal puncture through an intact septum using versacross under intracardiac ultrasound guidance without fluoroscopic guidance   Intracardiac echocardiography  Left ventricular pacing and recording  Drug infusion with an attempt to induce atrial tachydysrhythmia   Anesthesia: General anesthesia provided by the Anesthesia service    Indications for procedure:    Alanna Burt is a 69 y.o. male who has a history of paroxysmal atrial fibrillation who is symptomatic with symptoms of dyspnea with minimal exertion and fatigue now here for an ablation for paroxysmal atrial fibrillation. Details of Procedure: The risks, benefits and alternatives of the ablation procedure were discussed with the patient. The risks including, but not limited to, the risks of bleeding, infection, radiation exposure, injury to vascular, cardiac and surrounding structures (including pneumothorax), stroke, cardiac perforation, tamponade, need for emergent open heart surgery, need for pacemaker implantation, esophageal injury and fistula, myocardial infarction and death were discussed in detail. The patient opted to proceed with the ablation. Written informed consent was signed and placed in the chart. Patient was brought to the EP lab in a fasting non-sedate state. Patient underwent general anesthesia by anesthesia team. We initially performed a transesophageal echo that showed no left atrial appendage clot/thrombus. Procedure was done without use of fluoroscopy.      Both groins were prepped in a sterile fashion. We gained access in Right femoral vein. A 8-French sheath for ICE and 6F sheath for CS catheter and an agilis sheath for ablation and mapping catheters were  placed in the right femoral vein using modified seldinger technique. Ultrasound was used for femoral venous access. We gained access modified Seldinger technique and ultrasound guidance. The femoral vein was identified with real time visualization of needle passage to the venous lumen. Using 3-D mapping system Carto, the CS catheter was placed inside the coronary sinus  for recording and mapping of the left atrium. Using ICE we delineated the left pulmonary vein, left atrial appendage,  mitral valve, and right superior and right inferior pulmonary veins. Patient received a bolus of heparin prior transseptal puncture followed by continuous monitoring of the ACT and boluses of heparin during the procedure to keep the ACT between 350-400. Also an esophageal temperature probe in addition to Esophastar catheter was advanced into the esophagus for mapping of the esophagus and careful monitoring of the esophageal temperature during ablation. A transseptal puncture through intact septum was done using ICE and  pressure monitoring. Versacross needle inside the baylis sheath was used for the transseptal puncture. The baylis sheath was advanced and left inside the left atrium. The left atrium pressure was 19    Then a 3-3-3 Octaray catheter with deflectable curve and an irrigated ablation catheter was advanced into the left atrium sequentially and alternatively as needed. Using Yesenia Rolon  and [address] [facility] navigation system a three dimensional electro anatomical mapping of the left atrium, in addition to right and left sided pulmonary vein was created. Using Octaray  catheter voltage mapping of the left atrium was created. Maps below.      Also an esophageal temperature probe in addition to hesitate to contact me.     Oc Townsend MD  Cardiac Electrophysiology  [facility]

## 2023-11-30 NOTE — PROGRESS NOTES
ARRIVAL TO CATH LAB: 6:55 AM      ID & ALLERGY BAND: on     CONSENT: Yes    LABS/PREGNANCY TEST: N/A    PULSES: Right DP 2+  Right PT 2+  Left DP 2+  Left PT 2+    NPO SINCE:  12 am    IV SITE : Started in left arm.  with fluids infusing at kvo 6:55 AM     Ekg  Rhythm: Normal Sinus Rhythm

## 2023-11-30 NOTE — H&P
Cardiac Electrophysiology  Date: 11/30/2023   Reason for Consultation: Atrial fibrillation with RVR  Consult Requesting Physician: Derick Rahman MD     Chief Complaint:   Here for scheduled atrial fibrillation ablation. HPI: Jennifer Brooke is a 69 y.o. patient with a history of HTN, hemorrhagic gastritis while on aspirin and NSAIDS and seen on EGD 09/2022 (denies any andrea GI bleeding in past) and HLD. He presented to [facility] ED 05/02/2023  reporting symptoms of palpitations. Reportedly was lying in bed on 05/01/2023 around 2300 when he got up and started to feel \"weird. \"  He was unsure of palpitations but checked his pulse via pulse oximeter which revealed that it was very irregular. He was able to feel his radial pulse and again confirmed it was very irregular. Noted mild lightheaded or dizzy but denies any syncope. While in the emergency room he was noted to be in atrial fibrillation with v-rates in the 130s. He was given a diltiazem bolus and started on a drip. He spontaneously converted to sinus rhythm while in the emergency department. He was given a 30 day monitor, Echo revealed EF 55-60% and started on  Eliquis 5 mg BID. Monitor was worn from 05/02/2023-06/01/2023 and showed SR. I saw him in clinic 6/7/23 for a hospital follow up where we discussed treatment options for atrial fibrillation and chose to proceed with ablation. He was planning to go to [facility] in August 2023 and is concerned about the high altitude. Interval:  He is back from vacation and is ready to proceed with ablation.     Past Medical History:   Diagnosis Date    Abnormal finding 7/1/08    mitch    Abnormal finding     Hemoccult-- 7/20/2009 guiac neg// PSA-- 1/7/2011 .80     Cancer (HCC)     basal cell left ear, right cheek    Carpal tunnel syndrome on right     Dermatophytosis of nail     Disturbance of skin sensation     Hypercholesteremia     Hypertension     Impaired fasting glucose     Kidney stone

## 2023-11-30 NOTE — PROGRESS NOTES
Pt arrived from cath lab to PACU, awakens to voice, denies pain. VSS, O2 sats 100% on 10 L simple mask. Pt in NSR with HR in 90s. Dressing to right groin dry and intact, groin soft, right pedal and posterior tibial pulses palpable, foot warm. Washburn in place draining clear yellow urine. Will monitor.

## 2023-11-30 NOTE — DISCHARGE INSTRUCTIONS
Ablation    Care of your puncture site:  Remove bandage 24 hours after the procedure. May shower in 24 hours but do not sit in a bathtub/pool of water for 3 days or until the wound is healed. Inspect the site daily and gently clean using soap and water while standing in the shower. Dry thoroughly and apply a Band-Aid that covers the entire site. Do not apply powder or lotion. Normal Observations:  Soreness or tenderness which may last one week. Mild oozing from the incision site. Possible bruising that could last 2 weeks. Activity:  You may resume driving 24 hours following the procedure. You may resume normal activity in 3 days or after the wound heals. Avoid lifting more than 10 pounds for 3 days or until the wound heals. Avoid strenuous exercise or activity for 1 week. Nutrition:  Regular diet  Drink at least 8 to 10 glasses of decaffeinated, non-alcoholic fluid for the next 24 hours to flush the x-ray dye used for your angiogram out of your body. Call your doctor immediately if your condition worsens, for any other concerns, for a follow-up appointment or if you experience any of the following:  Significant bleeding that does not stop after 10 minutes of applying firm pressure on the puncture site. Increased swelling on the groin or leg. Unusual pain, numbness, or tingling of the groin or down the leg. Any signs of infection such as: redness, yellow drainage at the site, swelling or pain. ANESTHESIA DISCHARGE INSTRUCTIONS    Wear your seatbelt home. You are under the influence of drugs-do not drink alcohol, drive, operate machinery, make any important decisions or sign any legal documents for 24 hours. Children should not ride bikes, [place] or play on gym sets for 24 hours after surgery. A responsible adult needs to be with you for 24 hours. You may experience lightheadedness, dizziness, or sleepiness following surgery.   Rest at home today- increase activity as

## 2023-11-30 NOTE — ANESTHESIA POSTPROCEDURE EVALUATION
Department of Anesthesiology  Postprocedure Note    Patient: Ronna Rudd  MRN: [id number]  YOB: 1945  Date of evaluation: 11/30/2023      Procedure Summary       Date: 11/30/23 Room / Location: [facility] Cath Lab; [facility] Echocardiography    Anesthesia Start: 0727 Anesthesia Stop: 1006    Procedure: ECHOCARDIOGRAM TRANSESOPHAGEAL Diagnosis:       PAF (paroxysmal atrial fibrillation) (HCC)      Paroxysmal atrial fibrillation      (To check for clots in the APPLE prior to ablation.)    Scheduled Providers: Antoinette Conley MD Responsible Provider: Antoinette Conley MD    Anesthesia Type: general ASA Status: 3            Anesthesia Type: No value filed.     Kenzie Phase I: Kenzie Score: 8    Kenzie Phase II:        Anesthesia Post Evaluation    Patient location during evaluation: PACU  Patient participation: complete - patient participated  Level of consciousness: awake and alert  Airway patency: patent  Nausea & Vomiting: no nausea and no vomiting  Complications: no  Cardiovascular status: blood pressure returned to baseline  Respiratory status: acceptable  Hydration status: euvolemic  Multimodal analgesia pain management approach  Pain management: adequate denies pain/discomfort

## 2023-11-30 NOTE — PROGRESS NOTES
Discharge instructions and new medication prescription reviewed with pt and pts wife at bedside, both verbalized understanding. Pt safely dressed and ambulated in PACU, transferred self to wheelchair, IV removed without complications. Pt discharged in wheelchair with all belongings to car by Susana Saleem. Pt tolerated well.

## 2023-11-30 NOTE — PROGRESS NOTES
Pt resting quietly in bed with eyes closed, awakens to voice, denies pain. VSS, O2 sats 100% on room air. NSR with HR in 80s. Dressing to right groin remains dry and intact, scant spot of serosanguinous drainage on dressing, groin soft. Washburn remains in place draining clear yellow urine. Pt seen by anesthesia, phase 1 criteria met.

## 2023-12-01 RX ORDER — PANTOPRAZOLE SODIUM 40 MG/1
40 TABLET, DELAYED RELEASE ORAL
Qty: 90 TABLET | OUTPATIENT
Start: 2023-12-01

## 2023-12-06 ENCOUNTER — OFFICE VISIT (OUTPATIENT)
Dept: SLEEP MEDICINE | Age: 78
End: 2023-12-06
Payer: MEDICARE

## 2023-12-06 VITALS
BODY MASS INDEX: 31.28 KG/M2 | RESPIRATION RATE: 18 BRPM | WEIGHT: 236 LBS | TEMPERATURE: 98.4 F | DIASTOLIC BLOOD PRESSURE: 80 MMHG | SYSTOLIC BLOOD PRESSURE: 140 MMHG | OXYGEN SATURATION: 98 % | HEIGHT: 73 IN | HEART RATE: 88 BPM

## 2023-12-06 DIAGNOSIS — Z99.89 DEPENDENCE ON OTHER ENABLING MACHINES AND DEVICES: ICD-10-CM

## 2023-12-06 DIAGNOSIS — G47.33 OSA ON CPAP: Primary | ICD-10-CM

## 2023-12-06 DIAGNOSIS — I10 ESSENTIAL HYPERTENSION: ICD-10-CM

## 2023-12-06 DIAGNOSIS — I48.91 ATRIAL FIBRILLATION WITH RAPID VENTRICULAR RESPONSE (HCC): ICD-10-CM

## 2023-12-06 PROCEDURE — 3079F DIAST BP 80-89 MM HG: CPT | Performed by: PSYCHIATRY & NEUROLOGY

## 2023-12-06 PROCEDURE — 1123F ACP DISCUSS/DSCN MKR DOCD: CPT | Performed by: PSYCHIATRY & NEUROLOGY

## 2023-12-06 PROCEDURE — 3077F SYST BP >= 140 MM HG: CPT | Performed by: PSYCHIATRY & NEUROLOGY

## 2023-12-06 PROCEDURE — 99214 OFFICE O/P EST MOD 30 MIN: CPT | Performed by: PSYCHIATRY & NEUROLOGY

## 2023-12-06 ASSESSMENT — SLEEP AND FATIGUE QUESTIONNAIRES
HOW LIKELY ARE YOU TO NOD OFF OR FALL ASLEEP WHILE SITTING QUIETLY AFTER LUNCH WITHOUT ALCOHOL: 0
HOW LIKELY ARE YOU TO NOD OFF OR FALL ASLEEP WHILE WATCHING TV: 2
HOW LIKELY ARE YOU TO NOD OFF OR FALL ASLEEP IN A CAR, WHILE STOPPED FOR A FEW MINUTES IN TRAFFIC: 0
HOW LIKELY ARE YOU TO NOD OFF OR FALL ASLEEP WHILE SITTING AND READING: 1
HOW LIKELY ARE YOU TO NOD OFF OR FALL ASLEEP WHILE SITTING INACTIVE IN A PUBLIC PLACE: 0
HOW LIKELY ARE YOU TO NOD OFF OR FALL ASLEEP WHEN YOU ARE A PASSENGER IN A CAR FOR AN HOUR WITHOUT A BREAK: 0
HOW LIKELY ARE YOU TO NOD OFF OR FALL ASLEEP WHILE LYING DOWN TO REST IN THE AFTERNOON WHEN CIRCUMSTANCES PERMIT: 2
HOW LIKELY ARE YOU TO NOD OFF OR FALL ASLEEP WHILE SITTING AND TALKING TO SOMEONE: 0
ESS TOTAL SCORE: 5

## 2023-12-06 ASSESSMENT — ENCOUNTER SYMPTOMS
CHOKING: 0
APNEA: 0

## 2023-12-06 NOTE — PROGRESS NOTES
Themandibular molar Class :   [x]1 []2 []3      Mallampati I Airway Classification:   []1 []2 []3 [x]4      Physical Exam  Vitals and nursing note reviewed. Constitutional:       Appearance: Normal appearance. Cardiovascular:      Rate and Rhythm: Normal rate and regular rhythm. Pulmonary:      Effort: Pulmonary effort is normal.      Breath sounds: Normal breath sounds. Musculoskeletal:      Right lower leg: No edema. Left lower leg: No edema.         :        Diagnosis Orders   1. JOELLEN on CPAP        2. Dependence on other enabling machines and devices        3. Atrial fibrillation with rapid ventricular response ([address])        4. Essential hypertension            Assessment/Plan:   1. Moderate Obstructive sleep apnea syndrome syndrome   Assessment & Plan:  Under good control on PAP at 9.7 cmwp, I will continue the PAP at 5-15 cmwp. I Encouraged the patient to use the machine on nightly basis, at least 4 hours a night. I instructed the patient to adjust the humidifier as needed for dry mouth. Reviewed and analyzed results of physiologic download from patient's machine and reviewed with patient. Supplies and parts as needed for her machine. These are medically necessary. 2. Hypertension:  Assessment & Plan:  Chronic- Stable. Discussed the importance of treating sleep apnea as part of the management of this disorder. Cont any meds per PCP and other physicians. 3. Atrial fibrillation:   Assessment & Plan:  Chronic- Stable. Discussed the importance of treating sleep apnea as part of the management of this disorder. Cont any meds per PCP and other physicians. Reviewed, analyzed, and documented physiologic data from patient's PAP machine. No orders of the defined types were placed in this encounter. Return in about 1 year (around 12/6/2024) for Reveiwing CPAP usage and compliance report and tro.     Quinn Ballesteros MD  Medical Director - [facility]

## 2023-12-07 ENCOUNTER — TELEPHONE (OUTPATIENT)
Dept: CARDIOLOGY CLINIC | Age: 78
End: 2023-12-07

## 2023-12-07 NOTE — TELEPHONE ENCOUNTER
Christian Campbell called the office wanting to discuss if his increased pulse rate is normal post ablation? He wasn't sure if this was to be concerned about.       Kapil's callback: [phone]

## 2023-12-07 NOTE — TELEPHONE ENCOUNTER
Spoke with the patient HR currently at rest around 78-80. While moving HR is 88-90 bpm. Patient reports he feels fine. BP this morning was 128/75 with HR of 88 bpm. 90 day blanking period discussed in detail with the patient. Medications reviewed. Advised patient to continue monitoring blood pressure and pulse. Notified him to contact the office if HR remains elevated and does not return to baseline or if he begins to experience symptoms or they begin to worsen. Patient voiced understanding.  Call complete

## 2023-12-11 ENCOUNTER — NURSE ONLY (OUTPATIENT)
Dept: CARDIOLOGY CLINIC | Age: 78
End: 2023-12-11
Payer: MEDICARE

## 2023-12-11 DIAGNOSIS — I49.9 IRREGULAR HEART BEAT: Primary | ICD-10-CM

## 2023-12-11 PROCEDURE — 93000 ELECTROCARDIOGRAM COMPLETE: CPT | Performed by: INTERNAL MEDICINE

## 2023-12-11 NOTE — PROGRESS NOTES
Patient presents today with c/o \" episodess\". He states he is feeling good. He presents with blood pressure and heart rate log. His heart rates have been in 80s. .     Discussed monitoring rate and rhythm at home. Discussed blanking period. He verbalized understanding. Will follow up as scheduled.

## 2024-01-06 PROBLEM — I48.91 NEW ONSET ATRIAL FIBRILLATION (HCC): Status: RESOLVED | Noted: 2023-05-02 | Resolved: 2024-01-06

## 2024-01-06 NOTE — PROGRESS NOTES
assure blood pressure remains controlled for cardiovascular risk factor modification  The patient is counseled to avoid excess caffeine, and energy drinks as this may exacerbated ectopy and arrhythmia  The patient is counseled to lose weight to control cardiovascular risk factors  Exercise program discussed: To improve overall cardiovascular health, the patient is instructed to increase cardiovascular related activities with a goal of 150 min/week of moderate level activity or 10,000 steps per day. Encouraged to perform as much activity as tolerated    I have addressed the patient's cardiac risk factors and adjusted pharmacologic treatment as needed. In addition, I have reinforced the need for patient directed risk factor modification.    I independently reviewed the ECG    All questions and concerns were addressed with the patient. Alternatives to treatment were discussed.     Thank you for allowing to us to participate in the care of OLGA Johnston-[facility]   Office: [phone]

## 2024-01-11 NOTE — PROGRESS NOTES
rhythm. Reports he is doing well overall.     Tolerating current medications well with no concerns.       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     Dermatophytosis of nail     Disturbance of skin sensation     Hypercholesteremia     Hypertension     Impaired fasting glucose     Kidney stone     Routine general medical examination at a health care facility     Sleep apnea     Special screening for malignant neoplasm of prostate     TGA (transient global amnesia)     Weight gain       Past Surgical History:   Procedure Laterality Date    ANKLE SURGERY  2002    right ankle plate     CARPAL TUNNEL RELEASE  1999    right hand    COLONOSCOPY  11/30/07    COLONOSCOPY  04/23/2018    polyp dr jonas check in 5 years    COLONOSCOPY N/A 9/13/2022    COLONOSCOPY POLYPECTOMY SNARE/COLD BIOPSY performed by Billy Hook MD at [facility] ENDOSCOPY    SKIN CANCER EXCISION      basal cell ear and cheek    UPPER GASTROINTESTINAL ENDOSCOPY N/A 9/13/2022    EGD BIOPSY performed by Billy Hook MD at [facility] ENDOSCOPY       Allergies:  Allergies   Allergen Reactions    Bee Venom Swelling     Has epi-pen    Penicillins Hives and Itching    Vicodin [Hydrocodone-Acetaminophen] Rash       Medication:   Prior to Admission medications    Medication Sig Start Date End Date Taking? Authorizing Provider   pantoprazole (PROTONIX) 40 MG tablet Take 1 tablet by mouth every morning (before breakfast) 11/30/23  Yes Alli Sullivan MD   dilTIAZem (CARDIZEM CD) 240 MG extended release capsule Take 1 capsule by mouth daily 11/27/23  Yes Won Armendariz MD   sertraline (ZOLOFT) 25 MG tablet Take 1 tablet by mouth daily 11/21/23  Yes Won Armendariz MD   losartan (COZAAR) 100 MG tablet Take 1 tablet by mouth daily 9/15/23  Yes Won Armendariz MD   ELIQUIS 5 MG TABS tablet

## 2024-01-12 ENCOUNTER — OFFICE VISIT (OUTPATIENT)
Dept: CARDIOLOGY CLINIC | Age: 79
End: 2024-01-12
Payer: MEDICARE

## 2024-01-12 VITALS
BODY MASS INDEX: 31.68 KG/M2 | HEIGHT: 73 IN | WEIGHT: 239 LBS | OXYGEN SATURATION: 96 % | HEART RATE: 81 BPM | DIASTOLIC BLOOD PRESSURE: 74 MMHG | SYSTOLIC BLOOD PRESSURE: 130 MMHG

## 2024-01-12 DIAGNOSIS — I48.0 PAF (PAROXYSMAL ATRIAL FIBRILLATION) (HCC): Primary | ICD-10-CM

## 2024-01-12 PROCEDURE — 3078F DIAST BP <80 MM HG: CPT | Performed by: INTERNAL MEDICINE

## 2024-01-12 PROCEDURE — 3075F SYST BP GE 130 - 139MM HG: CPT | Performed by: INTERNAL MEDICINE

## 2024-01-12 PROCEDURE — 1123F ACP DISCUSS/DSCN MKR DOCD: CPT | Performed by: INTERNAL MEDICINE

## 2024-01-12 PROCEDURE — 93000 ELECTROCARDIOGRAM COMPLETE: CPT | Performed by: INTERNAL MEDICINE

## 2024-01-12 PROCEDURE — 99215 OFFICE O/P EST HI 40 MIN: CPT | Performed by: INTERNAL MEDICINE

## 2024-01-16 ENCOUNTER — OFFICE VISIT (OUTPATIENT)
Dept: FAMILY MEDICINE CLINIC | Age: 79
End: 2024-01-16
Payer: MEDICARE

## 2024-01-16 VITALS
WEIGHT: 248 LBS | DIASTOLIC BLOOD PRESSURE: 80 MMHG | BODY MASS INDEX: 33.59 KG/M2 | HEART RATE: 72 BPM | TEMPERATURE: 98.6 F | HEIGHT: 72 IN | SYSTOLIC BLOOD PRESSURE: 138 MMHG

## 2024-01-16 DIAGNOSIS — I71.40 ABDOMINAL AORTIC ANEURYSM (AAA) WITHOUT RUPTURE, UNSPECIFIED PART (HCC): ICD-10-CM

## 2024-01-16 DIAGNOSIS — R73.01 IMPAIRED FASTING GLUCOSE: Primary | ICD-10-CM

## 2024-01-16 DIAGNOSIS — E78.00 PURE HYPERCHOLESTEROLEMIA: ICD-10-CM

## 2024-01-16 DIAGNOSIS — I10 ESSENTIAL HYPERTENSION: ICD-10-CM

## 2024-01-16 PROCEDURE — 3075F SYST BP GE 130 - 139MM HG: CPT | Performed by: FAMILY MEDICINE

## 2024-01-16 PROCEDURE — 1123F ACP DISCUSS/DSCN MKR DOCD: CPT | Performed by: FAMILY MEDICINE

## 2024-01-16 PROCEDURE — 99214 OFFICE O/P EST MOD 30 MIN: CPT | Performed by: FAMILY MEDICINE

## 2024-01-16 PROCEDURE — 3079F DIAST BP 80-89 MM HG: CPT | Performed by: FAMILY MEDICINE

## 2024-01-16 ASSESSMENT — PATIENT HEALTH QUESTIONNAIRE - PHQ9
SUM OF ALL RESPONSES TO PHQ QUESTIONS 1-9: 0
SUM OF ALL RESPONSES TO PHQ9 QUESTIONS 1 & 2: 0
1. LITTLE INTEREST OR PLEASURE IN DOING THINGS: 0
2. FEELING DOWN, DEPRESSED OR HOPELESS: 0
SUM OF ALL RESPONSES TO PHQ QUESTIONS 1-9: 0

## 2024-01-16 ASSESSMENT — ENCOUNTER SYMPTOMS
SHORTNESS OF BREATH: 0
CONSTIPATION: 0
DIARRHEA: 0
ABDOMINAL PAIN: 0
BLOOD IN STOOL: 0

## 2024-01-16 NOTE — PROGRESS NOTES
Cuff Size:   Large Adult      Pulse:         72           Temp:    98.6 °F (37 °C)    TempSrc:    Temporal        Weight: 112.5 kg (248 lb)   Height:   1.829 m (6')     ---------------------------  Body mass index is 33.63 kg/m².     Wt Readings from Last 3 Encounters:  01/16/24 : 112.5 kg (248 lb)  01/12/24 : 108.4 kg (239 lb)  12/06/23 : 107 kg (236 lb)    BP Readings from Last 3 Encounters:  01/16/24 : 138/80  01/12/24 : 130/74  12/06/23 : (!) 140/80                Review of Systems   Constitutional:  Negative for chills and fever.   Respiratory:  Negative for shortness of breath.    Cardiovascular:  Negative for chest pain.   Gastrointestinal:  Negative for abdominal pain, blood in stool, constipation and diarrhea.        Hx of some mild constipation   Genitourinary:  Negative for difficulty urinating, dysuria and hematuria.   Neurological:  Negative for headaches.   Hematological:  Does not bruise/bleed easily.       Objective:   Physical Exam  Constitutional:       General: He is not in acute distress.     Appearance: Normal appearance. He is well-developed. He is not ill-appearing or diaphoretic.   Neck:      Thyroid: No thyroid mass or thyromegaly.   Cardiovascular:      Rate and Rhythm: Normal rate and regular rhythm.      Heart sounds: Normal heart sounds. No murmur heard.     No friction rub. No gallop.   Pulmonary:      Effort: Pulmonary effort is normal. No tachypnea, accessory muscle usage or respiratory distress.      Breath sounds: Normal breath sounds. No decreased breath sounds, wheezing, rhonchi or rales.   Abdominal:      Palpations: Abdomen is soft. There is no mass or pulsatile mass.      Tenderness: There is no abdominal tenderness. There is no guarding.   Musculoskeletal:      Cervical back: Neck supple.   Lymphadenopathy:      Cervical: No cervical adenopathy.      Upper Body:      Right upper body: No supraclavicular adenopathy.      Left upper body: No

## 2024-01-26 DIAGNOSIS — I71.40 ABDOMINAL AORTIC ANEURYSM (AAA) WITHOUT RUPTURE, UNSPECIFIED PART (HCC): ICD-10-CM

## 2024-01-26 DIAGNOSIS — E78.00 PURE HYPERCHOLESTEROLEMIA: ICD-10-CM

## 2024-01-26 DIAGNOSIS — R73.01 IMPAIRED FASTING GLUCOSE: ICD-10-CM

## 2024-01-26 DIAGNOSIS — I10 ESSENTIAL HYPERTENSION: ICD-10-CM

## 2024-01-26 LAB
CHOLEST SERPL-MCNC: 142 MG/DL (ref 0–199)
HDLC SERPL-MCNC: 67 MG/DL (ref 40–60)
LDLC SERPL CALC-MCNC: 51 MG/DL
TRIGL SERPL-MCNC: 119 MG/DL (ref 0–150)
VLDLC SERPL CALC-MCNC: 24 MG/DL

## 2024-01-27 LAB
EST. AVERAGE GLUCOSE BLD GHB EST-MCNC: 122.6 MG/DL
HBA1C MFR BLD: 5.9 %

## 2024-02-05 ENCOUNTER — OFFICE VISIT (OUTPATIENT)
Dept: CARDIOLOGY CLINIC | Age: 79
End: 2024-02-05
Payer: MEDICARE

## 2024-02-05 VITALS
DIASTOLIC BLOOD PRESSURE: 74 MMHG | SYSTOLIC BLOOD PRESSURE: 136 MMHG | BODY MASS INDEX: 32.51 KG/M2 | HEIGHT: 72 IN | HEART RATE: 75 BPM | WEIGHT: 240 LBS | OXYGEN SATURATION: 99 %

## 2024-02-05 DIAGNOSIS — E66.3 PATIENT OVERWEIGHT: ICD-10-CM

## 2024-02-05 DIAGNOSIS — I10 ESSENTIAL HYPERTENSION: ICD-10-CM

## 2024-02-05 DIAGNOSIS — Z09 HOSPITAL DISCHARGE FOLLOW-UP: ICD-10-CM

## 2024-02-05 DIAGNOSIS — E78.00 PURE HYPERCHOLESTEROLEMIA: ICD-10-CM

## 2024-02-05 DIAGNOSIS — I48.91 ATRIAL FIBRILLATION WITH RAPID VENTRICULAR RESPONSE (HCC): Primary | ICD-10-CM

## 2024-02-05 DIAGNOSIS — G47.33 OSA (OBSTRUCTIVE SLEEP APNEA): ICD-10-CM

## 2024-02-05 PROCEDURE — 1123F ACP DISCUSS/DSCN MKR DOCD: CPT | Performed by: NURSE PRACTITIONER

## 2024-02-05 PROCEDURE — 3075F SYST BP GE 130 - 139MM HG: CPT | Performed by: NURSE PRACTITIONER

## 2024-02-05 PROCEDURE — 1111F DSCHRG MED/CURRENT MED MERGE: CPT | Performed by: NURSE PRACTITIONER

## 2024-02-05 PROCEDURE — 93000 ELECTROCARDIOGRAM COMPLETE: CPT | Performed by: NURSE PRACTITIONER

## 2024-02-05 PROCEDURE — 99214 OFFICE O/P EST MOD 30 MIN: CPT | Performed by: NURSE PRACTITIONER

## 2024-02-05 PROCEDURE — 3078F DIAST BP <80 MM HG: CPT | Performed by: NURSE PRACTITIONER

## 2024-03-19 DIAGNOSIS — E78.00 PURE HYPERCHOLESTEROLEMIA: ICD-10-CM

## 2024-03-19 RX ORDER — ATORVASTATIN CALCIUM 20 MG/1
TABLET, FILM COATED ORAL
Qty: 90 TABLET | Refills: 2 | Status: SHIPPED | OUTPATIENT
Start: 2024-03-19

## 2024-03-25 RX ORDER — DILTIAZEM HYDROCHLORIDE 240 MG/1
240 CAPSULE, COATED, EXTENDED RELEASE ORAL DAILY
Qty: 90 CAPSULE | Refills: 1 | Status: SHIPPED | OUTPATIENT
Start: 2024-03-25

## 2024-05-06 PROBLEM — I48.91 ATRIAL FIBRILLATION WITH RAPID VENTRICULAR RESPONSE (HCC): Status: RESOLVED | Noted: 2023-05-02 | Resolved: 2024-05-06

## 2024-05-06 PROBLEM — R07.9 CHEST PAIN, RULE OUT ACUTE MYOCARDIAL INFARCTION: Status: RESOLVED | Noted: 2022-07-07 | Resolved: 2024-05-06

## 2024-05-06 PROBLEM — I48.0 PAF (PAROXYSMAL ATRIAL FIBRILLATION) (HCC): Status: ACTIVE | Noted: 2023-05-02

## 2024-05-06 NOTE — PROGRESS NOTES
[facility]   Electrophysiology  OLGA Cueva-CNP  Attending EP: Dr. Sullivan    Date: 6/5/2024  I had the privilege of visiting James Ta in the office.     Chief Complaint:   Chief Complaint   Patient presents with    Follow-up     4mnth no cardiac symptoms     History of Present Illness: History obtained from patient and medical record.    James Ta is 79 y.o. male with a past medical history of obesity, JOELLEN, PAF, HTN, hemorrhagic gastritis while on aspirin and NSAIDS and seen on EGD 09/2022 (denies any andrea GI bleeding in past) and HLD. S/p Pulmonary vein isolations using wide area circumferential radiofrequency ablation with confirmation of exit and entrance block (11/30/23)    -Interval history: Today, Jaems Ta is being seen for routine follow up. His wife is present for the visit. He remains in sinus rhythm with stable BP. His home log shows stable BP/pulse readings. He denies any recurrent afib since his last visit. They are planning a trip to [facility] in a couple months. He states he has started to having more hip pain in the last few months that has limited his walking. He takes tylenol, which helps the pain.     Denies having chest pain, palpitations, shortness of breath, orthopnea/PND, cough, or dizziness at the time of this visit.    With regard to medication therapy the patient has been compliant with prescribed regimen. They have tolerated therapy to date.     Allergies:  Allergies   Allergen Reactions    Bee Venom Swelling     Has epi-pen    Penicillins Hives and Itching    Vicodin [Hydrocodone-Acetaminophen] Rash     Home Medications:  Prior to Visit Medications    Medication Sig Taking? Authorizing Provider   dilTIAZem (CARDIZEM CD) 240 MG extended release capsule TAKE 1 CAPSULE BY MOUTH DAILY Yes Won Armendariz MD   atorvastatin (LIPITOR) 20 MG tablet TAKE 1 TABLET BY MOUTH AT BEDTIME Yes Shaylee Sierra APRN - NP   sertraline (ZOLOFT) 25 MG tablet Take

## 2024-06-05 ENCOUNTER — OFFICE VISIT (OUTPATIENT)
Dept: CARDIOLOGY CLINIC | Age: 79
End: 2024-06-05
Payer: MEDICARE

## 2024-06-05 VITALS
HEIGHT: 72 IN | HEART RATE: 65 BPM | DIASTOLIC BLOOD PRESSURE: 68 MMHG | WEIGHT: 244 LBS | SYSTOLIC BLOOD PRESSURE: 122 MMHG | OXYGEN SATURATION: 96 % | BODY MASS INDEX: 33.05 KG/M2

## 2024-06-05 DIAGNOSIS — I71.40 ABDOMINAL AORTIC ANEURYSM (AAA) WITHOUT RUPTURE, UNSPECIFIED PART (HCC): ICD-10-CM

## 2024-06-05 DIAGNOSIS — G47.33 OSA (OBSTRUCTIVE SLEEP APNEA): Primary | ICD-10-CM

## 2024-06-05 DIAGNOSIS — I10 ESSENTIAL HYPERTENSION: ICD-10-CM

## 2024-06-05 DIAGNOSIS — E78.00 PURE HYPERCHOLESTEROLEMIA: ICD-10-CM

## 2024-06-05 PROCEDURE — 3074F SYST BP LT 130 MM HG: CPT | Performed by: NURSE PRACTITIONER

## 2024-06-05 PROCEDURE — 99214 OFFICE O/P EST MOD 30 MIN: CPT | Performed by: NURSE PRACTITIONER

## 2024-06-05 PROCEDURE — 1123F ACP DISCUSS/DSCN MKR DOCD: CPT | Performed by: NURSE PRACTITIONER

## 2024-06-05 PROCEDURE — 93000 ELECTROCARDIOGRAM COMPLETE: CPT | Performed by: NURSE PRACTITIONER

## 2024-06-05 PROCEDURE — 3078F DIAST BP <80 MM HG: CPT | Performed by: NURSE PRACTITIONER

## 2024-07-09 RX ORDER — SERTRALINE HYDROCHLORIDE 25 MG/1
25 TABLET, FILM COATED ORAL DAILY
Qty: 30 TABLET | Refills: 3 | Status: SHIPPED | OUTPATIENT
Start: 2024-07-09

## 2024-07-13 SDOH — ECONOMIC STABILITY: TRANSPORTATION INSECURITY
IN THE PAST 12 MONTHS, HAS LACK OF TRANSPORTATION KEPT YOU FROM MEETINGS, WORK, OR FROM GETTING THINGS NEEDED FOR DAILY LIVING?: NO

## 2024-07-13 SDOH — ECONOMIC STABILITY: FOOD INSECURITY: WITHIN THE PAST 12 MONTHS, THE FOOD YOU BOUGHT JUST DIDN'T LAST AND YOU DIDN'T HAVE MONEY TO GET MORE.: NEVER TRUE

## 2024-07-13 SDOH — ECONOMIC STABILITY: INCOME INSECURITY: HOW HARD IS IT FOR YOU TO PAY FOR THE VERY BASICS LIKE FOOD, HOUSING, MEDICAL CARE, AND HEATING?: NOT VERY HARD

## 2024-07-13 SDOH — ECONOMIC STABILITY: FOOD INSECURITY: WITHIN THE PAST 12 MONTHS, YOU WORRIED THAT YOUR FOOD WOULD RUN OUT BEFORE YOU GOT MONEY TO BUY MORE.: NEVER TRUE

## 2024-07-13 SDOH — HEALTH STABILITY: PHYSICAL HEALTH: ON AVERAGE, HOW MANY DAYS PER WEEK DO YOU ENGAGE IN MODERATE TO STRENUOUS EXERCISE (LIKE A BRISK WALK)?: 3 DAYS

## 2024-07-13 SDOH — HEALTH STABILITY: PHYSICAL HEALTH: ON AVERAGE, HOW MANY MINUTES DO YOU ENGAGE IN EXERCISE AT THIS LEVEL?: 20 MIN

## 2024-07-13 ASSESSMENT — LIFESTYLE VARIABLES
HOW OFTEN DURING THE LAST YEAR HAVE YOU FOUND THAT YOU WERE NOT ABLE TO STOP DRINKING ONCE YOU HAD STARTED: NEVER
HOW OFTEN DO YOU HAVE SIX OR MORE DRINKS ON ONE OCCASION: 1
HAS A RELATIVE, FRIEND, DOCTOR, OR ANOTHER HEALTH PROFESSIONAL EXPRESSED CONCERN ABOUT YOUR DRINKING OR SUGGESTED YOU CUT DOWN: NO
HOW MANY STANDARD DRINKS CONTAINING ALCOHOL DO YOU HAVE ON A TYPICAL DAY: 1
HOW OFTEN DURING THE LAST YEAR HAVE YOU NEEDED AN ALCOHOLIC DRINK FIRST THING IN THE MORNING TO GET YOURSELF GOING AFTER A NIGHT OF HEAVY DRINKING: NEVER
HOW OFTEN DURING THE LAST YEAR HAVE YOU FOUND THAT YOU WERE NOT ABLE TO STOP DRINKING ONCE YOU HAD STARTED: NEVER
HAVE YOU OR SOMEONE ELSE BEEN INJURED AS A RESULT OF YOUR DRINKING: NO
HAVE YOU OR SOMEONE ELSE BEEN INJURED AS A RESULT OF YOUR DRINKING: NO
HOW OFTEN DURING THE LAST YEAR HAVE YOU BEEN UNABLE TO REMEMBER WHAT HAPPENED THE NIGHT BEFORE BECAUSE YOU HAD BEEN DRINKING: NEVER
HOW OFTEN DURING THE LAST YEAR HAVE YOU NEEDED AN ALCOHOLIC DRINK FIRST THING IN THE MORNING TO GET YOURSELF GOING AFTER A NIGHT OF HEAVY DRINKING: NEVER
HOW MANY STANDARD DRINKS CONTAINING ALCOHOL DO YOU HAVE ON A TYPICAL DAY: 1 OR 2
HOW OFTEN DURING THE LAST YEAR HAVE YOU HAD A FEELING OF GUILT OR REMORSE AFTER DRINKING: NEVER
HOW OFTEN DO YOU HAVE A DRINK CONTAINING ALCOHOL: 5
HOW OFTEN DURING THE LAST YEAR HAVE YOU HAD A FEELING OF GUILT OR REMORSE AFTER DRINKING: NEVER
HOW OFTEN DURING THE LAST YEAR HAVE YOU FAILED TO DO WHAT WAS NORMALLY EXPECTED FROM YOU BECAUSE OF DRINKING: NEVER
HOW OFTEN DO YOU HAVE A DRINK CONTAINING ALCOHOL: 4 OR MORE TIMES A WEEK
HOW OFTEN DURING THE LAST YEAR HAVE YOU BEEN UNABLE TO REMEMBER WHAT HAPPENED THE NIGHT BEFORE BECAUSE YOU HAD BEEN DRINKING: NEVER
HOW OFTEN DURING THE LAST YEAR HAVE YOU FAILED TO DO WHAT WAS NORMALLY EXPECTED FROM YOU BECAUSE OF DRINKING: NEVER

## 2024-07-13 ASSESSMENT — PATIENT HEALTH QUESTIONNAIRE - PHQ9
SUM OF ALL RESPONSES TO PHQ9 QUESTIONS 1 & 2: 0
2. FEELING DOWN, DEPRESSED OR HOPELESS: NOT AT ALL
SUM OF ALL RESPONSES TO PHQ QUESTIONS 1-9: 0
1. LITTLE INTEREST OR PLEASURE IN DOING THINGS: NOT AT ALL
SUM OF ALL RESPONSES TO PHQ QUESTIONS 1-9: 0

## 2024-07-16 ENCOUNTER — OFFICE VISIT (OUTPATIENT)
Dept: FAMILY MEDICINE CLINIC | Age: 79
End: 2024-07-16
Payer: MEDICARE

## 2024-07-16 VITALS
DIASTOLIC BLOOD PRESSURE: 82 MMHG | HEIGHT: 72 IN | WEIGHT: 245.2 LBS | BODY MASS INDEX: 33.21 KG/M2 | TEMPERATURE: 99 F | HEART RATE: 64 BPM | SYSTOLIC BLOOD PRESSURE: 124 MMHG

## 2024-07-16 DIAGNOSIS — R20.0 NUMBNESS AND TINGLING OF RIGHT THUMB: ICD-10-CM

## 2024-07-16 DIAGNOSIS — Z00.00 MEDICARE ANNUAL WELLNESS VISIT, SUBSEQUENT: Primary | ICD-10-CM

## 2024-07-16 DIAGNOSIS — Z12.5 PROSTATE CANCER SCREENING: ICD-10-CM

## 2024-07-16 DIAGNOSIS — R20.2 NUMBNESS AND TINGLING OF RIGHT THUMB: ICD-10-CM

## 2024-07-16 DIAGNOSIS — R73.09 ELEVATED GLUCOSE: ICD-10-CM

## 2024-07-16 DIAGNOSIS — R68.82 LOSS OF LIBIDO: ICD-10-CM

## 2024-07-16 DIAGNOSIS — M25.551 PAIN OF RIGHT HIP: ICD-10-CM

## 2024-07-16 DIAGNOSIS — I10 ESSENTIAL HYPERTENSION: Primary | ICD-10-CM

## 2024-07-16 PROCEDURE — 3074F SYST BP LT 130 MM HG: CPT | Performed by: FAMILY MEDICINE

## 2024-07-16 PROCEDURE — G0439 PPPS, SUBSEQ VISIT: HCPCS | Performed by: FAMILY MEDICINE

## 2024-07-16 PROCEDURE — 1123F ACP DISCUSS/DSCN MKR DOCD: CPT | Performed by: FAMILY MEDICINE

## 2024-07-16 PROCEDURE — 99214 OFFICE O/P EST MOD 30 MIN: CPT | Performed by: FAMILY MEDICINE

## 2024-07-16 PROCEDURE — 3079F DIAST BP 80-89 MM HG: CPT | Performed by: FAMILY MEDICINE

## 2024-07-16 ASSESSMENT — ENCOUNTER SYMPTOMS
ABDOMINAL PAIN: 0
DIARRHEA: 0
SHORTNESS OF BREATH: 0
BLOOD IN STOOL: 0
CONSTIPATION: 0

## 2024-07-16 NOTE — PROGRESS NOTES
Subjective:      Patient ID: James Ta is a 79 y.o. male.    Chief Complaint   Patient presents with    6 Month Follow-Up     Hypertension, lipids - pt is not fasting        Patient presents with:  6 Month Follow-Up: Hypertension, lipids - pt is not fasting    Here for the above   He is well     Right hip pain with some movements. When he bends and twists started after last visit in January  He has lost sexual interest. Will get erections and seem to be ok   Started some months back again about the last visit perhaps    Right tip of the thumb since last visit. Movement and strength of fingers and hands are ok   He has hx of CTS surgery years ago on the right  No pain in wrist /elbow. Occ discomfort neck positional and not new or all the time. Sporadic       YOB: 1945    Date of Visit:  7/16/2024     -- Bee Venom -- Swelling    --  Has epi-pen   -- Penicillins -- Hives and Itching   -- Vicodin [Hydrocodone-Acetaminophen] -- Rash    Current Outpatient Medications:  sertraline (ZOLOFT) 25 MG tablet, TAKE 1 TABLET BY MOUTH DAILY, Disp: 30 tablet, Rfl: 3  dilTIAZem (CARDIZEM CD) 240 MG extended release capsule, TAKE 1 CAPSULE BY MOUTH DAILY, Disp: 90 capsule, Rfl: 1  atorvastatin (LIPITOR) 20 MG tablet, TAKE 1 TABLET BY MOUTH AT BEDTIME, Disp: 90 tablet, Rfl: 2  losartan (COZAAR) 100 MG tablet, Take 1 tablet by mouth daily, Disp: 90 tablet, Rfl: 1  ELIQUIS 5 MG TABS tablet, TAKE 1 TABLET BY MOUTH TWICE DAILY, Disp: 180 tablet, Rfl: 3  flecainide (TAMBOCOR) 100 MG tablet, Take 1 tablet by mouth 2 times daily as needed (For symptoms of AFIB lasting longer than 30 mins), Disp: 60 tablet, Rfl: 3  Acetaminophen (TYLENOL PO), Take 650 mg by mouth as needed, Disp: , Rfl:   EPINEPHrine (EPIPEN) 0.3 MG/0.3ML SOAJ injection, Use as directed for allergic reaction, Disp: 1 each, Rfl: 0  hydrocortisone 2.5 % cream, Apply topically 2 times daily. prn, Disp: 45 g, Rfl: 0  Multiple Vitamin (MULTIVITAMIN) capsule,

## 2024-07-16 NOTE — PROGRESS NOTES
Medicare Annual Wellness Visit    James Ta is here for Medicare AWV    Assessment & Plan   Medicare annual wellness visit, subsequent  Recommendations for Preventive Services Due: see orders and patient instructions/AVS.  Recommended screening schedule for the next 5-10 years is provided to the patient in written form: see Patient Instructions/AVS.     Return in 1 year (on 7/16/2025) for Medicare AWV.     Subjective   Medicare AWV    Patient's complete Health Risk Assessment and screening values have been reviewed and are found in Flowsheets. The following problems were reviewed today and where indicated follow up appointments were made and/or referrals ordered.    Positive Risk Factor Screenings with Interventions:                Abnormal BMI (obese):  There is no height or weight on file to calculate BMI. (!) Abnormal  Interventions:  See AVS for additional education material         Hearing Screen:  Do you or your family notice any trouble with your hearing that hasn't been managed with hearing aids?: (!) Yes    Interventions:  See AVS for additional education material                     Objective   There were no vitals filed for this visit.   There is no height or weight on file to calculate BMI.                    Allergies   Allergen Reactions    Bee Venom Swelling     Has epi-pen    Penicillins Hives and Itching    Vicodin [Hydrocodone-Acetaminophen] Rash     Prior to Visit Medications    Medication Sig Taking? Authorizing Provider   sertraline (ZOLOFT) 25 MG tablet TAKE 1 TABLET BY MOUTH DAILY Yes Won Armendariz MD   dilTIAZem (CARDIZEM CD) 240 MG extended release capsule TAKE 1 CAPSULE BY MOUTH DAILY Yes Won Armendariz MD   atorvastatin (LIPITOR) 20 MG tablet TAKE 1 TABLET BY MOUTH AT BEDTIME Yes Shaylee Sierra APRN - NP   losartan (COZAAR) 100 MG tablet Take 1 tablet by mouth daily Yes Won Armendariz MD   ELIQUIS 5 MG TABS tablet TAKE 1 TABLET BY MOUTH TWICE DAILY Yes Alli Sullivan MD

## 2024-07-16 NOTE — PATIENT INSTRUCTIONS
Do the testing on the right hand and thumb  See the orthopedist for the right hip pain   See us back in January

## 2024-07-25 DIAGNOSIS — R73.09 ELEVATED GLUCOSE: ICD-10-CM

## 2024-07-25 DIAGNOSIS — Z12.5 PROSTATE CANCER SCREENING: ICD-10-CM

## 2024-07-25 DIAGNOSIS — R68.82 LOSS OF LIBIDO: ICD-10-CM

## 2024-07-25 DIAGNOSIS — I10 ESSENTIAL HYPERTENSION: ICD-10-CM

## 2024-07-25 LAB
ALBUMIN SERPL-MCNC: 4.5 G/DL (ref 3.4–5)
ALBUMIN/GLOB SERPL: 1.7 {RATIO} (ref 1.1–2.2)
ALP SERPL-CCNC: 73 U/L (ref 40–129)
ALT SERPL-CCNC: 25 U/L (ref 10–40)
ANION GAP SERPL CALCULATED.3IONS-SCNC: 11 MMOL/L (ref 3–16)
AST SERPL-CCNC: 19 U/L (ref 15–37)
BACTERIA URNS QL MICRO: ABNORMAL /HPF
BILIRUB SERPL-MCNC: 1.1 MG/DL (ref 0–1)
BILIRUB UR QL STRIP.AUTO: NEGATIVE
BUN SERPL-MCNC: 15 MG/DL (ref 7–20)
CALCIUM SERPL-MCNC: 9.8 MG/DL (ref 8.3–10.6)
CHLORIDE SERPL-SCNC: 105 MMOL/L (ref 99–110)
CLARITY UR: CLEAR
CO2 SERPL-SCNC: 26 MMOL/L (ref 21–32)
COLOR UR: YELLOW
CREAT SERPL-MCNC: 0.8 MG/DL (ref 0.8–1.3)
EPI CELLS #/AREA URNS AUTO: 0 /HPF (ref 0–5)
EST. AVERAGE GLUCOSE BLD GHB EST-MCNC: 119.8 MG/DL
GFR SERPLBLD CREATININE-BSD FMLA CKD-EPI: >90 ML/MIN/{1.73_M2}
GLUCOSE SERPL-MCNC: 103 MG/DL (ref 70–99)
GLUCOSE UR STRIP.AUTO-MCNC: NEGATIVE MG/DL
HBA1C MFR BLD: 5.8 %
HGB UR QL STRIP.AUTO: NEGATIVE
HYALINE CASTS #/AREA URNS AUTO: 0 /LPF (ref 0–8)
KETONES UR STRIP.AUTO-MCNC: NEGATIVE MG/DL
LEUKOCYTE ESTERASE UR QL STRIP.AUTO: NEGATIVE
NITRITE UR QL STRIP.AUTO: NEGATIVE
PH UR STRIP.AUTO: 6.5 [PH] (ref 5–8)
POTASSIUM SERPL-SCNC: 4.9 MMOL/L (ref 3.5–5.1)
PROT SERPL-MCNC: 7.2 G/DL (ref 6.4–8.2)
PROT UR STRIP.AUTO-MCNC: 30 MG/DL
PSA SERPL DL<=0.01 NG/ML-MCNC: 0.86 NG/ML (ref 0–4)
RBC CLUMPS #/AREA URNS AUTO: 1 /HPF (ref 0–4)
SODIUM SERPL-SCNC: 142 MMOL/L (ref 136–145)
SP GR UR STRIP.AUTO: 1.02 (ref 1–1.03)
UA DIPSTICK W REFLEX MICRO PNL UR: YES
URN SPEC COLLECT METH UR: ABNORMAL
UROBILINOGEN UR STRIP-ACNC: 1 E.U./DL
WBC #/AREA URNS AUTO: 1 /HPF (ref 0–5)

## 2024-07-26 DIAGNOSIS — R80.0 ISOLATED PROTEINURIA WITHOUT SPECIFIC MORPHOLOGIC LESION: Primary | ICD-10-CM

## 2024-07-26 LAB — TESTOST SERPL-MCNC: 470 NG/DL (ref 193–740)

## 2024-07-30 RX ORDER — LOSARTAN POTASSIUM 100 MG/1
100 TABLET ORAL DAILY
Qty: 90 TABLET | Refills: 1 | Status: SHIPPED | OUTPATIENT
Start: 2024-07-30

## 2024-07-30 NOTE — TELEPHONE ENCOUNTER
Medication Refill    When was your last appointment with cardiology?    (If 1 yr or longer, please schedule appointment)    (If patient has been told they do not need to follow-up - medications should be filled by PCP)    1/12/2024    When did you last have labs drawn?     7/25/2024, CMP    Medication needing refilled?    Eliquis    Dosage of the medication?    5 mg    How are you taking this medication (QD, BID, TID, QID, PRN)?    1 PO BID    Do you want a 30 or 90 day supply?    90-day supply    When will you run out of your medication?     Now    Which Pharmacy are we sending this medication to?    [facility] [address]    Pharmacy Phone: [phone]  Pharmacy Fax: [phone]

## 2024-07-31 RX ORDER — APIXABAN 5 MG/1
TABLET, FILM COATED ORAL
Qty: 180 TABLET | Refills: 1 | Status: SHIPPED | OUTPATIENT
Start: 2024-07-31

## 2024-08-28 DIAGNOSIS — R80.0 ISOLATED PROTEINURIA WITHOUT SPECIFIC MORPHOLOGIC LESION: ICD-10-CM

## 2024-08-28 LAB
BACTERIA URNS QL MICRO: ABNORMAL /HPF
BILIRUB UR QL STRIP.AUTO: NEGATIVE
CLARITY UR: CLEAR
COLOR UR: YELLOW
EPI CELLS #/AREA URNS AUTO: 0 /HPF (ref 0–5)
GLUCOSE UR STRIP.AUTO-MCNC: NEGATIVE MG/DL
HGB UR QL STRIP.AUTO: NEGATIVE
HYALINE CASTS #/AREA URNS AUTO: 0 /LPF (ref 0–8)
KETONES UR STRIP.AUTO-MCNC: NEGATIVE MG/DL
LEUKOCYTE ESTERASE UR QL STRIP.AUTO: ABNORMAL
NITRITE UR QL STRIP.AUTO: NEGATIVE
PH UR STRIP.AUTO: 6.5 [PH] (ref 5–8)
PROT UR STRIP.AUTO-MCNC: NEGATIVE MG/DL
RBC CLUMPS #/AREA URNS AUTO: 0 /HPF (ref 0–4)
SP GR UR STRIP.AUTO: 1.01 (ref 1–1.03)
UA DIPSTICK W REFLEX MICRO PNL UR: YES
URN SPEC COLLECT METH UR: ABNORMAL
UROBILINOGEN UR STRIP-ACNC: 0.2 E.U./DL
WBC #/AREA URNS AUTO: 0 /HPF (ref 0–5)

## 2024-09-05 ENCOUNTER — OFFICE VISIT (OUTPATIENT)
Dept: ORTHOPEDIC SURGERY | Age: 79
End: 2024-09-05
Payer: MEDICARE

## 2024-09-05 VITALS — WEIGHT: 240 LBS | BODY MASS INDEX: 31.81 KG/M2 | HEIGHT: 73 IN

## 2024-09-05 DIAGNOSIS — M25.551 RIGHT HIP PAIN: Primary | ICD-10-CM

## 2024-09-05 DIAGNOSIS — M54.16 LUMBAR RADICULOPATHY: ICD-10-CM

## 2024-09-05 DIAGNOSIS — M54.50 LOW BACK PAIN, UNSPECIFIED BACK PAIN LATERALITY, UNSPECIFIED CHRONICITY, UNSPECIFIED WHETHER SCIATICA PRESENT: ICD-10-CM

## 2024-09-05 PROCEDURE — 99203 OFFICE O/P NEW LOW 30 MIN: CPT | Performed by: PHYSICIAN ASSISTANT

## 2024-09-05 PROCEDURE — 1123F ACP DISCUSS/DSCN MKR DOCD: CPT | Performed by: PHYSICIAN ASSISTANT

## 2024-09-05 NOTE — PROGRESS NOTES
had a face-to-face discussion in regards to the patient's current condition and treatment options. More than 50 % of the time was counseling and coordination of care as indicated above.   I spent 30 minutes providing this service today, to include the time spent seeing the patient, documenting, and reviewing chart excluding any separately billed procedures.     PROCEDURE NOTE:  At this point we decided to send some formal physical therapy we will do a Medrol Dosepak and further droll based on how he responds to this and may consider getting an MRI if he does not show significant improvement      They will schedule a follow up in 4 weeks

## 2024-09-10 ENCOUNTER — TELEPHONE (OUTPATIENT)
Dept: ORTHOPEDIC SURGERY | Age: 79
End: 2024-09-10

## 2024-09-10 RX ORDER — METHYLPREDNISOLONE 4 MG
TABLET, DOSE PACK ORAL
Qty: 1 KIT | Refills: 0 | Status: SHIPPED | OUTPATIENT
Start: 2024-09-10

## 2024-09-11 ENCOUNTER — HOSPITAL ENCOUNTER (OUTPATIENT)
Dept: PHYSICAL THERAPY | Age: 79
Setting detail: THERAPIES SERIES
Discharge: HOME | End: 2024-09-11
Payer: MEDICARE

## 2024-09-11 DIAGNOSIS — M54.16 LUMBAR RADICULOPATHY: Primary | ICD-10-CM

## 2024-09-11 PROCEDURE — 97530 THERAPEUTIC ACTIVITIES: CPT

## 2024-09-11 PROCEDURE — 97161 PT EVAL LOW COMPLEX 20 MIN: CPT

## 2024-09-11 PROCEDURE — 97112 NEUROMUSCULAR REEDUCATION: CPT

## 2024-09-11 ASSESSMENT — PAIN SCALES - QUEBEC BACK PAIN DISABILITY SCALE
SLEEP THROUGH THE NIGHT: MINIMALLY DIFFICULT
PUT ON SOCKS OR PANYHOSE: NOT DIFFICULT AT ALL
MAKE YOUR BED: NOT DIFFICULT AT ALL
BEND OVER TO CLEAN THE BATHTUB: NOT DIFFICULT AT ALL
REACH UP TO HIGH SHELVES: MINIMALLY DIFFICULT
LIFT AND CARRY A HEAVY SUITCASE: MINIMALLY DIFFICULT
RIDE IN A CAR: MINIMALLY DIFFICULT
MOVE A CHAIR: NOT DIFFICULT AT ALL
WALK SEVERAL KILOMETERS  OR MILES: MINIMALLY DIFFICULT
RUN ONE BLOCK OR 100M: MINIMALLY DIFFICULT
TURN OVER IN BED: MINIMALLY DIFFICULT
SIT IN A CHAIR FOR SEVERAL HOURS: MINIMALLY DIFFICULT
CLIMB ONE FLIGHT OF STAIRS: NOT DIFFICULT AT ALL
THROW A BALL: NOT DIFFICULT AT ALL
TOTAL SCORE: 11
TAKE FOOD OUT OF THE REFRIGERATOR: NOT DIFFICULT AT ALL
STAND UP FOR 20 TO 30 MINUTES: SOMEWHAT DIFFICULT
GET OUT OF BED: NOT DIFFICULT AT ALL
CARRY TWO BAGS OF GROCERIES: MINIMALLY DIFFICULT
WALK A FEW BLOCKS OR 300 TO 400M: NOT DIFFICULT AT ALL

## 2024-09-16 RX ORDER — DILTIAZEM HYDROCHLORIDE 240 MG/1
240 CAPSULE, COATED, EXTENDED RELEASE ORAL DAILY
Qty: 90 CAPSULE | Refills: 0 | Status: SHIPPED | OUTPATIENT
Start: 2024-09-16

## 2024-09-18 ENCOUNTER — APPOINTMENT (OUTPATIENT)
Dept: PHYSICAL THERAPY | Age: 79
End: 2024-09-18
Payer: MEDICARE

## 2024-09-20 ENCOUNTER — APPOINTMENT (OUTPATIENT)
Dept: PHYSICAL THERAPY | Age: 79
End: 2024-09-20
Payer: MEDICARE

## 2024-09-24 ENCOUNTER — APPOINTMENT (OUTPATIENT)
Dept: PHYSICAL THERAPY | Age: 79
End: 2024-09-24
Payer: MEDICARE

## 2024-09-27 ENCOUNTER — HOSPITAL ENCOUNTER (OUTPATIENT)
Dept: PHYSICAL THERAPY | Age: 79
Setting detail: THERAPIES SERIES
Discharge: HOME | End: 2024-09-27
Payer: MEDICARE

## 2024-09-27 PROCEDURE — 97140 MANUAL THERAPY 1/> REGIONS: CPT

## 2024-09-27 PROCEDURE — 97110 THERAPEUTIC EXERCISES: CPT

## 2024-10-01 ENCOUNTER — HOSPITAL ENCOUNTER (OUTPATIENT)
Dept: PHYSICAL THERAPY | Age: 79
Setting detail: THERAPIES SERIES
Discharge: HOME | End: 2024-10-01
Payer: MEDICARE

## 2024-10-01 PROCEDURE — 97112 NEUROMUSCULAR REEDUCATION: CPT

## 2024-10-01 PROCEDURE — 97110 THERAPEUTIC EXERCISES: CPT

## 2024-10-01 PROCEDURE — 97140 MANUAL THERAPY 1/> REGIONS: CPT

## 2024-10-01 NOTE — FLOWSHEET NOTE
treatment interventions:    Interventions:  Therapeutic Exercise (59254) including: strength training, ROM, and functional mobility  Therapeutic Activities (11380) including: functional mobility training and education.  Neuromuscular Re-education (36721) activation and proprioception, including postural re-education.    Manual Therapy (13715) as indicated to include: Gr I-IV mobilizations, Soft Tissue Mobilization, Dry Needling/IASTM, Trigger Point Release, and Myofascial Release    Plan: Monitor response.  Instruct patient in sidelying clamshell with pillow and/or bent leg fall out for HEP; initiate theraslide lat pull down, row, ext in clinic if able.    Electronically Signed by Elizabeth Friend, PT [id number]  Date: 10/01/2024     Note: Portions of this note have been templated and/or copied from initial evaluation, reassessments and prior notes for documentation efficiency.    Note: If patient does not return for scheduled/recommended follow up visits, this note will serve as a discharge from care along with the most recent update on progress.

## 2024-10-04 ENCOUNTER — HOSPITAL ENCOUNTER (OUTPATIENT)
Dept: PHYSICAL THERAPY | Age: 79
Setting detail: THERAPIES SERIES
Discharge: HOME | End: 2024-10-04
Payer: MEDICARE

## 2024-10-04 PROCEDURE — 97110 THERAPEUTIC EXERCISES: CPT

## 2024-10-04 PROCEDURE — 97112 NEUROMUSCULAR REEDUCATION: CPT

## 2024-10-04 PROCEDURE — 97140 MANUAL THERAPY 1/> REGIONS: CPT

## 2024-10-04 NOTE — FLOWSHEET NOTE
[facility]- Outpatient Rehabilitation and Therapy [address] [place] [address] office: [phone] fax: [phone]       Physical Therapy: TREATMENT/PROGRESS NOTE   Patient: James Ta (79 y.o. male)   Examination Date: 10/04/2024   :  1945 MRN: [id number]   Visit #: 4   Insurance Allowable Auth Needed   5 visits  24 - 24 [x]Yes via Carelon    []No    Insurance: Payor: [facility] MEDICARE / Plan: ANTHEM MEDIBLUE ESSENTIAL/PLUS / Product Type: *No Product type* /PT Insurance Information: Insurance:[employer] MEDICARE   Codes Billable: 09753 NOT COVERED  Copay: $40  Visit Limit: BMN  Auth Req: AUTH THRU CARELON    Insurance ID: [id number] - (Medicare Managed)  Secondary Insurance (if applicable):    Treatment Diagnosis:     ICD-10-CM    1. Lumbar radiculopathy  M54.16          Medical Diagnosis:  Lumbar radiculopathy [M54.16]   Referring Physician: Phi Falk MD  PCP: Won Armendariz MD     Plan of care signed (Y/N): cosign requested    Date of Patient follow up with Physician:      Plan of Care Report: NO  POC update due: (10 visits /OR AUTH LIMITS, whichever is less)  10/11/2024                                             Medical History:  Comorbidities:  Cancer/Tumor  Hypertension  Relevant Medical History: R ankle surgery , R carpal tunnel release , basal cell L ear, R cheek, hypercholesteremia, kidney stone, transient global amnesia, atrial fibrillation with cardiac - ablasion , anxiety                                         Precautions/ Contra-indications:           Latex allergy:  NO  Pacemaker:    NO  Contraindications for Manipulation: NA  Date of Surgery:   Other:    Red Flags:  None    Suicide Screening:   The patient did not verbalize a primary behavioral concern, suicidal ideation, suicidal intent, or demonstrate suicidal behaviors.    Preferred Language for Healthcare:   [x] English       [] other:    SUBJECTIVE EXAMINATION     Patient stated

## 2024-10-08 ENCOUNTER — HOSPITAL ENCOUNTER (OUTPATIENT)
Dept: PHYSICAL THERAPY | Age: 79
Setting detail: THERAPIES SERIES
Discharge: HOME | End: 2024-10-08
Payer: MEDICARE

## 2024-10-08 PROCEDURE — 97140 MANUAL THERAPY 1/> REGIONS: CPT

## 2024-10-08 PROCEDURE — 97110 THERAPEUTIC EXERCISES: CPT

## 2024-10-08 PROCEDURE — 97112 NEUROMUSCULAR REEDUCATION: CPT

## 2024-10-08 ASSESSMENT — PAIN SCALES - QUEBEC BACK PAIN DISABILITY SCALE
CLIMB ONE FLIGHT OF STAIRS: NOT DIFFICULT AT ALL
SLEEP THROUGH THE NIGHT: MINIMALLY DIFFICULT
RUN ONE BLOCK OR 100M: MINIMALLY DIFFICULT
PULL OR PUSH HEAVY DOORS: NOT DIFFICULT AT ALL
TAKE FOOD OUT OF THE REFRIGERATOR: NOT DIFFICULT AT ALL
WALK A FEW BLOCKS OR 300 TO 400M: NOT DIFFICULT AT ALL
SIT IN A CHAIR FOR SEVERAL HOURS: NOT DIFFICULT AT ALL
GET OUT OF BED: NOT DIFFICULT AT ALL
MAKE YOUR BED: NOT DIFFICULT AT ALL
STAND UP FOR 20 TO 30 MINUTES: MINIMALLY DIFFICULT
PUT ON SOCKS OR PANYHOSE: NOT DIFFICULT AT ALL
TURN OVER IN BED: MINIMALLY DIFFICULT
WALK SEVERAL KILOMETERS  OR MILES: MINIMALLY DIFFICULT
RIDE IN A CAR: MINIMALLY DIFFICULT
LIFT AND CARRY A HEAVY SUITCASE: MINIMALLY DIFFICULT
REACH UP TO HIGH SHELVES: NOT DIFFICULT AT ALL
BEND OVER TO CLEAN THE BATHTUB: NOT DIFFICULT AT ALL
MOVE A CHAIR: NOT DIFFICULT AT ALL
THROW A BALL: NOT DIFFICULT AT ALL
TOTAL SCORE: 7
CARRY TWO BAGS OF GROCERIES: NOT DIFFICULT AT ALL

## 2024-10-08 NOTE — FLOWSHEET NOTE
Adjusted    Long Term Goals: To be achieved in: 4-6 weeks  1. Disability index score of 6 or less for the [place] Back Pain Disability Scale to assist with reaching prior level of function with activities such as sitting for prolonged periods.  [x] Progressing: [] Met: [] Not Met: [] Adjusted  2. Patient will demonstrate increased AROM of lumbar spine to WFL without pain to allow for proper joint functioning to enable patient to getting out of bed without difficulty.   [x] Progressing: [] Met: [] Not Met: [] Adjusted  3. Patient will demonstrate increased Strength of core and B LEs to demonstrate improved muscle activation/motor control to allow for proper functional mobility to enable patient to return to standing prolonged periods.   [x] Progressing: [] Met: [] Not Met: [] Adjusted  4. Patient will return to usual work activities without increased symptoms or restriction.   [x] Progressing: [] Met: [] Not Met: [] Adjusted       Overall Progression Towards Functional goals/ Treatment Progress Update:  [x] Patient is progressing as expected towards functional goals listed.    [] Progression is slowed due to complexities/Impairments listed.  [] Progression has been slowed due to co-morbidities.  [] Plan just implemented, too soon (<30days) to assess goals progression   [] Goals require adjustment due to lack of progress  [] Patient is not progressing as expected and requires additional follow up with physician  [x] Other: Patient would benefit from continued PT to increase functional mobility.    TREATMENT PLAN     Frequency/Duration: 1-2x/week for 4-6 weeks for the following treatment interventions:    Interventions:  Therapeutic Exercise (88416) including: strength training, ROM, and functional mobility  Therapeutic Activities (44244) including: functional mobility training and education.  Neuromuscular Re-education (62166) activation and proprioception, including postural re-education.    Manual Therapy (91011) as

## 2024-10-11 ENCOUNTER — HOSPITAL ENCOUNTER (OUTPATIENT)
Dept: PHYSICAL THERAPY | Age: 79
Setting detail: THERAPIES SERIES
Discharge: HOME | End: 2024-10-11
Payer: MEDICARE

## 2024-10-11 PROCEDURE — 97140 MANUAL THERAPY 1/> REGIONS: CPT

## 2024-10-11 PROCEDURE — 97110 THERAPEUTIC EXERCISES: CPT

## 2024-10-11 PROCEDURE — 97112 NEUROMUSCULAR REEDUCATION: CPT

## 2024-10-11 NOTE — FLOWSHEET NOTE
Functional Deficits.  [] Progressing: [x] Met: [] Not Met: [] Adjusted    Long Term Goals: To be achieved in: 4-6 weeks  1. Disability index score of 6 or less for the [place] Back Pain Disability Scale to assist with reaching prior level of function with activities such as sitting for prolonged periods.  [x] Progressing: [] Met: [] Not Met: [] Adjusted  2. Patient will demonstrate increased AROM of lumbar spine to WFL without pain to allow for proper joint functioning to enable patient to getting out of bed without difficulty.   [x] Progressing: [] Met: [] Not Met: [] Adjusted  3. Patient will demonstrate increased Strength of core and B LEs to demonstrate improved muscle activation/motor control to allow for proper functional mobility to enable patient to return to standing prolonged periods.   [x] Progressing: [] Met: [] Not Met: [] Adjusted  4. Patient will return to usual work activities without increased symptoms or restriction.   [x] Progressing: [] Met: [] Not Met: [] Adjusted       Overall Progression Towards Functional goals/ Treatment Progress Update:  [x] Patient is progressing as expected towards functional goals listed.    [] Progression is slowed due to complexities/Impairments listed.  [] Progression has been slowed due to co-morbidities.  [] Plan just implemented, too soon (<30days) to assess goals progression   [] Goals require adjustment due to lack of progress  [] Patient is not progressing as expected and requires additional follow up with physician  [x] Other: Patient would benefit from continued PT to increase functional mobility.    TREATMENT PLAN     Frequency/Duration: 1-2x/week for 4-6 weeks for the following treatment interventions:    Interventions:  Therapeutic Exercise (92902) including: strength training, ROM, and functional mobility  Therapeutic Activities (02969) including: functional mobility training and education.  Neuromuscular Re-education (99058) activation and proprioception,

## 2024-10-15 ENCOUNTER — HOSPITAL ENCOUNTER (OUTPATIENT)
Dept: PHYSICAL THERAPY | Age: 79
Setting detail: THERAPIES SERIES
Discharge: HOME | End: 2024-10-15
Payer: MEDICARE

## 2024-10-15 PROCEDURE — 97110 THERAPEUTIC EXERCISES: CPT

## 2024-10-15 PROCEDURE — 97112 NEUROMUSCULAR REEDUCATION: CPT

## 2024-10-15 PROCEDURE — 97140 MANUAL THERAPY 1/> REGIONS: CPT

## 2024-10-15 NOTE — FLOWSHEET NOTE
[facility]- Outpatient Rehabilitation and Therapy [address] [place] [address] office: [phone] fax: [phone]       Physical Therapy: TREATMENT/PROGRESS NOTE   Patient: James Ta (79 y.o. male)   Examination Date: 10/15/2024   :  1945 MRN: [id number]   Visit #: 7   Insurance Allowable Auth Needed   4 visits  10/11/24 - 24 [x]Yes via Carelon    []No    Insurance: Payor: [facility] MEDICARE / Plan: ANTHEM MEDIBLUE ESSENTIAL/PLUS / Product Type: *No Product type* /PT Insurance Information: Insurance:[employer] MEDICARE   Codes Billable: [id number] NOT COVERED  Copay: $40  Visit Limit: BMN  Auth Req: AUTH THRU CARELON    Insurance ID: [id number] - (Medicare Managed)  Secondary Insurance (if applicable):    Treatment Diagnosis:     ICD-10-CM    1. Lumbar radiculopathy  M54.16          Medical Diagnosis:  Lumbar radiculopathy [M54.16]   Referring Physician: Phi Falk MD  PCP: Won Armendariz MD     Plan of care signed (Y/N): cosign requested    Date of Patient follow up with Physician:      Plan of Care Report: NO  POC update due: (10 visits /OR AUTH LIMITS, whichever is less)  10/11/2024                                             Medical History:  Comorbidities:  Cancer/Tumor  Hypertension  Relevant Medical History: R ankle surgery , R carpal tunnel release , basal cell L ear, R cheek, hypercholesteremia, kidney stone, transient global amnesia, atrial fibrillation with cardiac - ablasion , anxiety                                         Precautions/ Contra-indications:           Latex allergy:  NO  Pacemaker:    NO  Contraindications for Manipulation: NA  Date of Surgery:   Other:    Red Flags:  None    Suicide Screening:   The patient did not verbalize a primary behavioral concern, suicidal ideation, suicidal intent, or demonstrate suicidal behaviors.    Preferred Language for Healthcare:   [x] English       [] other:    SUBJECTIVE EXAMINATION     Patient stated

## 2024-10-22 ENCOUNTER — HOSPITAL ENCOUNTER (OUTPATIENT)
Dept: PHYSICAL THERAPY | Age: 79
Setting detail: THERAPIES SERIES
Discharge: HOME | End: 2024-10-22
Payer: MEDICARE

## 2024-10-22 PROCEDURE — 97112 NEUROMUSCULAR REEDUCATION: CPT

## 2024-10-22 PROCEDURE — 97140 MANUAL THERAPY 1/> REGIONS: CPT

## 2024-10-22 PROCEDURE — 97110 THERAPEUTIC EXERCISES: CPT

## 2024-10-22 NOTE — FLOWSHEET NOTE
[facility]- Outpatient Rehabilitation and Therapy [address] [place] [address] office: [phone] fax: [phone]       Physical Therapy: TREATMENT/PROGRESS NOTE   Patient: James Ta (79 y.o. male)   Examination Date: 10/22/2024   :  1945 MRN: [id number]   Visit #: 8   Insurance Allowable Auth Needed   4 visits  10/11/24 - 24 [x]Yes via Carelon    []No    Insurance: Payor: [facility] MEDICARE / Plan: ANTHEM MEDIBLUE ESSENTIAL/PLUS / Product Type: *No Product type* /PT Insurance Information: Insurance:[employer] MEDICARE   Codes Billable: 10056 NOT COVERED  Copay: $40  Visit Limit: BMN  Auth Req: AUTH THRU CARELON    Insurance ID: [id number] - (Medicare Managed)  Secondary Insurance (if applicable):    Treatment Diagnosis:     ICD-10-CM    1. Lumbar radiculopathy  M54.16          Medical Diagnosis:  Lumbar radiculopathy [M54.16]   Referring Physician: Phi Falk MD  PCP: Won Armendariz MD     Plan of care signed (Y/N): cosign requested    Date of Patient follow up with Physician:      Plan of Care Report: NO  POC update due: (10 visits /OR AUTH LIMITS, whichever is less)  10/11/2024                                             Medical History:  Comorbidities:  Cancer/Tumor  Hypertension  Relevant Medical History: R ankle surgery , R carpal tunnel release , basal cell L ear, R cheek, hypercholesteremia, kidney stone, transient global amnesia, atrial fibrillation with cardiac - ablasion , anxiety                                         Precautions/ Contra-indications:           Latex allergy:  NO  Pacemaker:    NO  Contraindications for Manipulation: NA  Date of Surgery:   Other:    Red Flags:  None    Suicide Screening:   The patient did not verbalize a primary behavioral concern, suicidal ideation, suicidal intent, or demonstrate suicidal behaviors.    Preferred Language for Healthcare:   [x] English       [] other:    SUBJECTIVE EXAMINATION     Patient stated

## 2024-10-25 RX ORDER — SERTRALINE HYDROCHLORIDE 25 MG/1
25 TABLET, FILM COATED ORAL DAILY
Qty: 30 TABLET | Refills: 3 | Status: SHIPPED | OUTPATIENT
Start: 2024-10-25

## 2024-10-29 ENCOUNTER — HOSPITAL ENCOUNTER (OUTPATIENT)
Dept: PHYSICAL THERAPY | Age: 79
Setting detail: THERAPIES SERIES
Discharge: HOME | End: 2024-10-29
Payer: MEDICARE

## 2024-10-29 PROCEDURE — 97110 THERAPEUTIC EXERCISES: CPT

## 2024-10-29 PROCEDURE — 97112 NEUROMUSCULAR REEDUCATION: CPT

## 2024-10-29 ASSESSMENT — PAIN SCALES - QUEBEC BACK PAIN DISABILITY SCALE
TOTAL SCORE: 5
MOVE A CHAIR: NOT DIFFICULT AT ALL
PULL OR PUSH HEAVY DOORS: NOT DIFFICULT AT ALL
WALK A FEW BLOCKS OR 300 TO 400M: NOT DIFFICULT AT ALL
SIT IN A CHAIR FOR SEVERAL HOURS: NOT DIFFICULT AT ALL
PUT ON SOCKS OR PANYHOSE: NOT DIFFICULT AT ALL
REACH UP TO HIGH SHELVES: NOT DIFFICULT AT ALL
SLEEP THROUGH THE NIGHT: MINIMALLY DIFFICULT
RUN ONE BLOCK OR 100M: MINIMALLY DIFFICULT
TAKE FOOD OUT OF THE REFRIGERATOR: NOT DIFFICULT AT ALL
GET OUT OF BED: NOT DIFFICULT AT ALL
CLIMB ONE FLIGHT OF STAIRS: NOT DIFFICULT AT ALL
MAKE YOUR BED: NOT DIFFICULT AT ALL
CARRY TWO BAGS OF GROCERIES: NOT DIFFICULT AT ALL
TURN OVER IN BED: NOT DIFFICULT AT ALL
THROW A BALL: NOT DIFFICULT AT ALL
WALK SEVERAL KILOMETERS  OR MILES: MINIMALLY DIFFICULT
RIDE IN A CAR: NOT DIFFICULT AT ALL
BEND OVER TO CLEAN THE BATHTUB: NOT DIFFICULT AT ALL
STAND UP FOR 20 TO 30 MINUTES: MINIMALLY DIFFICULT
LIFT AND CARRY A HEAVY SUITCASE: MINIMALLY DIFFICULT

## 2024-10-29 NOTE — PLAN OF CARE
[facility]- [facility] [address], [place] [address] office: [phone] fax: [phone]      Physical Therapy Discharge Summary    Dear Phi Falk MD   ,    We had the pleasure of treating the following patient for physical therapy services at [facility].  A summary of our findings can be found in the discharge summary below.  If you have any questions or concerns regarding these findings, please do not hesitate to contact me at the office phone number checked above.  Thank you for the referral.       Functional Outcome: [place] Back Pain Disability Scale: 5  disability    Total Visits: 9     Plan of Care: Discharge from Physical Therapy    Reason for Discharge:  All Goals achieved and Insurance/Financial limitations  If patient has flare up, he may need additional PT in the future.         Physical Therapy: TREATMENT/PROGRESS NOTE   Patient: James Ta (79 y.o. male)   Examination Date: 10/29/2024   :  1945 MRN: [id number]   Visit #: 9   Insurance Allowable Auth Needed   4 visits  10/11/24 - 24 [x]Yes via [employer]    []No    Insurance: Payor: [employer] MEDICARE / Plan: [employer] MEDIBLUE ESSENTIAL/PLUS / Product Type: *No Product type* /PT Insurance Information: Insurance:[employer] MEDICARE   Codes Billable: 06409 NOT COVERED  Copay: $40  Visit Limit: BMN  Auth Req: AUTH THRU CARELON    Insurance ID: [id number] - (Medicare Managed)  Secondary Insurance (if applicable):    Treatment Diagnosis:     ICD-10-CM    1. Lumbar radiculopathy  M54.16          Medical Diagnosis:  Lumbar radiculopathy [M54.16]   Referring Physician: Phi Falk MD  PCP: Won Armendariz MD     Plan of care signed (Y/N): cosign requested    Date of Patient follow up with Physician:      Plan of Care Report: NO  POC update due: (10 visits /OR AUTH LIMITS, whichever is less)  10/11/2024                                             Medical

## 2024-11-14 NOTE — PROGRESS NOTES
4/15/2019    Jose Martin Nick is a 68 y.o. male    Chief Complaint   Patient presents with    Annual Exam     cpe-? carpel tunnel left hand       SUBJECTIVE  HPI Jose Martin Nick is a 68 y.o. male presenting today with a chief complaint of high blood pressure and elevated cholesterol. Last eye and dental checks 2018  He is  Sleeping 7-8 hours at night. Review of Systems   Constitutional: Negative for chills and fever. HENT: Negative for ear pain, mouth sores, nosebleeds and trouble swallowing. Eyes: Negative for pain. Respiratory: Negative for shortness of breath and wheezing. Cardiovascular: Negative for chest pain and leg swelling. Gastrointestinal: Negative for abdominal pain, blood in stool, constipation, diarrhea, nausea and vomiting. Genitourinary: Negative for dysuria, hematuria and urgency. Musculoskeletal: Negative for joint swelling and neck pain. Skin: Negative for rash. Neurological: Negative for dizziness, tremors, syncope and weakness. Hematological: Negative for adenopathy. Does not bruise/bleed easily.        Past Medical History:   Diagnosis Date    Abnormal finding 7/1/08    mitch    Abnormal finding     Hemoccult-- 7/20/2009 guiac neg// PSA-- 1/7/2011 .80     Carpal tunnel syndrome on right     Dermatophytosis of nail     Disturbance of skin sensation     Hypercholesteremia     Hypertension     Impaired fasting glucose     Kidney stone     Routine general medical examination at a health care facility     Special screening for malignant neoplasm of prostate     Weight gain      Past Surgical History:   Procedure Laterality Date    ANKLE SURGERY  2002    right ankle plate     CARPAL TUNNEL RELEASE  1999    right hand    COLONOSCOPY  11/30/07     Family History   Problem Relation Age of Onset    Cancer Mother         pancreatic cancer     Heart Attack Father     Cancer Sister         throat    Hypertension Brother     Other Brother         cabg Social History     Tobacco Use   Smoking Status Former Smoker    Packs/day: 1.00    Years: 10.00    Pack years: 10.00    Last attempt to quit: 1990    Years since quittin.3   Smokeless Tobacco Never Used      Patient Active Problem List   Diagnosis    Other abnormal glucose    Carpal tunnel syndrome    Dermatophytosis of nail    Disturbance of skin sensation    Essential hypertension    Abnormal weight gain    Impaired fasting glucose    Pure hypercholesterolemia    HTN (hypertension)    TGA (transient global amnesia)    Colon polyps    Patient overweight    Onychomycosis       OBJECTIVE  Physical Exam   Constitutional: He is oriented to person, place, and time. He appears well-developed and well-nourished. Neck: Normal range of motion. Neck supple. No thyromegaly present. Cardiovascular: Normal rate, regular rhythm and normal heart sounds. Pulmonary/Chest: Effort normal and breath sounds normal. No respiratory distress. Abdominal: Soft. Bowel sounds are normal. He exhibits no distension and no mass. There is no tenderness. Musculoskeletal: Normal range of motion. He exhibits no edema or tenderness. Lymphadenopathy:     He has no cervical adenopathy. Neurological: He is alert and oriented to person, place, and time. Skin: Skin is warm and dry. No rash noted. Psychiatric: He has a normal mood and affect. His behavior is normal. Judgment and thought content normal.   Vitals reviewed.       allergies  Bee venom; Penicillins; and Vicodin [hydrocodone-acetaminophen]  Current Outpatient Medications   Medication Sig Dispense Refill    losartan (COZAAR) 100 MG tablet Take 1 tablet by mouth daily 90 tablet 3    atorvastatin (LIPITOR) 20 MG tablet TAKE 1 TABLET BY MOUTH AT BEDTIME 90 tablet 3    EPINEPHrine (EPIPEN) 0.3 MG/0.3ML SOAJ injection Use as directed for allergic reaction 1 each 0    UNABLE TO FIND States takes an otc bowel stimulant       aspirin (ADULT ASPIRIN EC LOW STRENGTH) 81 MG EC tablet Take 81 mg by mouth daily.  Multiple Vitamin (MULTIVITAMIN) capsule Take 1 capsule by mouth daily.  ibuprofen (ADVIL;MOTRIN) 600 MG tablet Take 1 tablet by mouth every 6 hours as needed for Pain 30 tablet 0     No current facility-administered medications for this visit. Vitals:    04/15/19 1117   BP: 130/80   Temp: 98.5 °F (36.9 °C)     Body mass index is 29.9 kg/m².   Immunization History   Administered Date(s) Administered    Influenza Virus Vaccine 10/31/2011, 08/31/2014, 11/25/2017, 10/20/2018    Influenza Whole 12/30/2013, 11/01/2015    Influenza, High Dose (Fluzone 65 yrs and older) 12/11/2012, 10/18/2016    Pneumococcal 13-valent Conjugate (Yceovfc80) 03/08/2016    Pneumococcal Polysaccharide (Zcnzvgznx22) 05/30/2013    Td, unspecified formulation 03/03/2015    Tdap (Boostrix, Adacel) 07/01/2008    Zoster Live (Zostavax) 11/20/2012     Lab Review   Orders Only on 04/09/2019   Component Date Value    Hemoglobin A1C 04/09/2019 5.8     eAG 04/09/2019 119.8     PSA 04/09/2019 0.97     Cholesterol, Total 04/09/2019 126     Triglycerides 04/09/2019 44     HDL 04/09/2019 54     LDL Calculated 04/09/2019 63     VLDL Cholesterol Calcula* 04/09/2019 9     Sodium 04/09/2019 137     Potassium 04/09/2019 4.5     Chloride 04/09/2019 103     CO2 04/09/2019 24     Anion Gap 04/09/2019 10     Glucose 04/09/2019 118*    BUN 04/09/2019 13     CREATININE 04/09/2019 0.8     GFR Non- 04/09/2019 >60     GFR  04/09/2019 >60     Calcium 04/09/2019 9.4      EDT History of colon polyps   Results for this procedure are in the results section.       Results  - from Last 3 Months    Colonoscopy Procedure (04/23/2018 9:17 AM)  Colonoscopy Procedure (04/23/2018 9:17 AM)   Narrative   Chelle Sim MD     4/23/2018  9:18 AM                    Colonoscopy Procedure Note        Patient: Rayna Dobson MRN: [id number]       Date of Birth: 1945  Age: 71year old  Sex: male      Unit: [facility] ENDOSCOPY Room/Bed: [facility] Location: [address] [facility]             Admitting Physician: Maxwell Rudolph         Primary Care Physician: Jennifer Chambers DO          OPERATIVE SURGEON: Kelsey Whitley MD            DATE OF OPERATION: 4/23/2018        Preoperative Diagnosis: History of colon polyps [Z86.010]            ASA: ASA II         Sedation:     Moderate/IV sedation    The patient was reassessed prior to sedation and is stable for     conscious sedation.    Under IV sedation, monitoring patient including EKG, pulse     oximetry, and BP        Conscious Sedation Start Time     Conscious Sedation Start 0829         Procedure Start Time     Procedure Start 7950         Conscious Sedation End/Procedure End Time     Procedure Finish 0909         Versed:  5 mg IV    Demerol: 50 mg IV            Procedure Details:      Informed consent was obtained for the procedure, including     conscious sedation. Risks including but not limited to infection,     perforation, hemorrhage,  missed lesions, adverse drug reaction,     and aspiration were discussed.    The patient was placed in the left lateral decubitus position.      Upon sequential sedation as per above, a digital rectal exam was     performed and was normal. The Olympus videocolonoscope was     inserted in the rectum and carefully advanced to the  Cecum     confirmed by appendiceal orifice and IC valve . The Terminal     ileum was  intubated.  The quality of preparation was Adequate.      The colonoscope was slowly withdrawn with careful evaluation     between folds. Retroflexion in the rectum was performed and was     normal.        Cecum Intubated: Yes.         Complications: None.         Estimated blood loss: None             Findings:         The mucosa in the cecum, ascending colon, transverse colon,     descending colon, sigmoid colon and rectum was normal.    TI was normal.  Findings suggestive of moderate to severe     melanosis coli seen.  3 mm polyp in the sigmoid colon removed by     cold biopsy forceps.  Mild to moderate left-sided diverticulosis.    Retroflexion in the rectum revealed small internal hemorrhoids.            Impression:    1. 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         2. 3 mm polyp in the sigmoid colon removed by cold biopsy     forceps.      3. Mild to moderate left-sided diverticulosis.             Plan:     1. Follow up pathology results    2. High fiber diet    3. Metamucil daily and MiraLAX daily when necessary for     constipation    4. Surveillance colonoscopy in 5 years           ASSESSMENT AND PLAN     Diagnosis Orders   1. Pure hypercholesterolemia  Lipid Panel    atorvastatin (LIPITOR) 20 MG tablet   2. Prediabetes  Hemoglobin A1C   3. Hypertension, unspecified type  Comprehensive Metabolic Panel   4. Screening for prostate cancer  Psa screening   5. Hand pain, left  EMG     Discussed stopping aspirin. We discussed risk versus benefit. Discussed exercise for cardiovascular health and low-cholesterol diet. Refill meds    Stable on current meds.   Continue with currentmeds  New meds this visit:    Orders Placed This Encounter   Medications    losartan (COZAAR) 100 MG tablet     Sig: Take 1 tablet by mouth daily     Dispense:  90 tablet     Refill:  3    atorvastatin (LIPITOR) 20 MG tablet     Sig: TAKE 1 TABLET BY MOUTH AT BEDTIME     Dispense:  90 tablet     Refill:  3    EPINEPHrine (EPIPEN) 0.3 MG/0.3ML SOAJ injection     Sig: Use as directed for allergic reaction     Dispense:  1 each     Refill:  0     New orders placed this visit:    Orders Placed This Encounter   Procedures    Comprehensive Metabolic Panel     Standing Status:   Future     Standing Expiration Date:   7/15/2020    Hemoglobin A1C     Standing Status:   Future     Standing Expiration Date:   7/15/2020    Lipid Panel     Standing Status: Future     Standing Expiration Date:   7/15/2020     Order Specific Question:   Is Patient Fasting?/# of Hours     Answer:   12 hours    Psa screening     Standing Status:   Future     Standing Expiration Date:   7/15/2020    EMG     Standing Status:   Future     Standing Expiration Date:   6/14/2019     Return in about 1 year (around 4/15/2020) for 30 min yearly physical exam..  continuewith the following meds:    Outpatient Encounter Medications as of 4/15/2019   Medication Sig Dispense Refill    losartan (COZAAR) 100 MG tablet Take 1 tablet by mouth daily 90 tablet 3    atorvastatin (LIPITOR) 20 MG tablet TAKE 1 TABLET BY MOUTH AT BEDTIME 90 tablet 3    EPINEPHrine (EPIPEN) 0.3 MG/0.3ML SOAJ injection Use as directed for allergic reaction 1 each 0    UNABLE TO FIND States takes an otc bowel stimulant       aspirin (ADULT ASPIRIN EC LOW STRENGTH) 81 MG EC tablet Take 81 mg by mouth daily.  Multiple Vitamin (MULTIVITAMIN) capsule Take 1 capsule by mouth daily.  [DISCONTINUED] losartan (COZAAR) 100 MG tablet Take 1 tablet by mouth daily 90 tablet 2    [DISCONTINUED] atorvastatin (LIPITOR) 20 MG tablet TAKE 1 TABLET BY MOUTH AT BEDTIME 90 tablet 3    ibuprofen (ADVIL;MOTRIN) 600 MG tablet Take 1 tablet by mouth every 6 hours as needed for Pain 30 tablet 0    [DISCONTINUED] EPINEPHrine (EPIPEN 2-ANTONINO) 0.3 MG/0.3ML DAPHNEY injection Inject 0.3 mLs into the muscle as needed. 2 Device 0     No facility-administered encounter medications on file as of 4/15/2019.           Ruddy Crain D.O. Bed/Stretcher in lowest position, wheels locked, appropriate side rails in place/Call bell, personal items and telephone in reach/Instruct patient to call for assistance before getting out of bed/chair/stretcher/Non-slip footwear applied when patient is off stretcher/[place] to call system/Physically safe environment - no spills, clutter or unnecessary equipment/Purposeful proactive rounding/Room/bathroom lighting operational, light cord in reach

## 2024-12-02 RX ORDER — DILTIAZEM HYDROCHLORIDE 240 MG/1
240 CAPSULE, COATED, EXTENDED RELEASE ORAL DAILY
Qty: 30 CAPSULE | OUTPATIENT
Start: 2024-12-02

## 2024-12-02 RX ORDER — DILTIAZEM HYDROCHLORIDE 240 MG/1
240 CAPSULE, COATED, EXTENDED RELEASE ORAL DAILY
Qty: 90 CAPSULE | Refills: 0 | Status: SHIPPED | OUTPATIENT
Start: 2024-12-02

## 2024-12-03 DIAGNOSIS — E78.00 PURE HYPERCHOLESTEROLEMIA: ICD-10-CM

## 2024-12-03 RX ORDER — ATORVASTATIN CALCIUM 20 MG/1
TABLET, FILM COATED ORAL
Qty: 90 TABLET | Refills: 2 | Status: SHIPPED | OUTPATIENT
Start: 2024-12-03

## 2024-12-05 NOTE — PROGRESS NOTES
05/02/23 with normal EF of 55-60%  - Patient has a [id number] Score 3 (HTN, AGE)  - Continue Eliquis 5 mg BID daily for thromboembolic risk reduction.  -Tolerating well no signs or symptoms of abnormal bruising or bleeding.  - EKG today shows sinus rhythm   - S/p EPS with RFA of AF and PVI 11/30/23  - taking Flecainide PRN for breakthrough episodes     Mr. Ta presents for follow up. He is in sinus rhythm today. Reports he is doing well overall with no objective or subjective recurrence of atrial fibrillation. He continues to log his BP and heart rates at home. Average rates of 60 bpm. He also denies having to take any PRN Flecainide. Will continue current treatment plan and monitor closely for recurrence of atrial fibrillation.      Essential HYPERTENSION               - 132/74 - controlled today    - Stable               - Continue diltiazem 120 mg qd and losartan 50 mg qd.    JOELLEN  - Stable  - Encourage to use machine to prevent long term effects of untreated JOELLEN    Obesity  Body mass index is 34.18 kg/m².  - Excessive weight is complicating assessment and treatment. It is placing patient at risk for multiple co-morbidities as well as early death and contributing to the patient's presentation.  - Discussed weight management with diet and exercise      Follow ups:  - Follow up in 1 year     Thank you for allowing me to participate in the care of James Ta. All questions and concerns were addressed to the patient/family. Alternatives to my treatment were discussed.     Scribe attestation: This note was scribed in the presence of Alli Sullivan MD by Joy Pacheco LPN    Physician attestation: The scribe's documentation has been prepared under my direction and has been personally reviewed by me in its entirety. I confirm that the note above reflects all work, treatment, procedures, and medical decision making performed by me.     Alli Sullivan MD  Cardiac Electrophysiology  [facility]

## 2024-12-06 ENCOUNTER — OFFICE VISIT (OUTPATIENT)
Dept: CARDIOLOGY CLINIC | Age: 79
End: 2024-12-06
Payer: MEDICARE

## 2024-12-06 VITALS
WEIGHT: 252 LBS | HEART RATE: 65 BPM | HEIGHT: 72 IN | BODY MASS INDEX: 34.13 KG/M2 | OXYGEN SATURATION: 96 % | DIASTOLIC BLOOD PRESSURE: 74 MMHG | SYSTOLIC BLOOD PRESSURE: 132 MMHG

## 2024-12-06 DIAGNOSIS — I48.0 PAF (PAROXYSMAL ATRIAL FIBRILLATION) (HCC): Primary | ICD-10-CM

## 2024-12-06 PROCEDURE — 99214 OFFICE O/P EST MOD 30 MIN: CPT | Performed by: INTERNAL MEDICINE

## 2024-12-06 PROCEDURE — 3078F DIAST BP <80 MM HG: CPT | Performed by: INTERNAL MEDICINE

## 2024-12-06 PROCEDURE — 3075F SYST BP GE 130 - 139MM HG: CPT | Performed by: INTERNAL MEDICINE

## 2024-12-06 PROCEDURE — 93000 ELECTROCARDIOGRAM COMPLETE: CPT | Performed by: INTERNAL MEDICINE

## 2024-12-06 PROCEDURE — 1159F MED LIST DOCD IN RCRD: CPT | Performed by: INTERNAL MEDICINE

## 2024-12-06 PROCEDURE — 1123F ACP DISCUSS/DSCN MKR DOCD: CPT | Performed by: INTERNAL MEDICINE

## 2024-12-06 NOTE — PATIENT INSTRUCTIONS
HAPPY HOLIDAYS !   
ASSESSMENT  25 year old woman G3A3 with a past medical history of asthma presented for flu like symptoms over the past few days from urgent care with dysuria       IMPRESSION  #UTI - Cystitis  - Urine Cx 7/22/2024 - ESBL E Coli   - was give metronidazole prior to hospitalization at OSH     #Recent COVID infection     #Abx allergy: No Known Allergies    RECOMMENDATIONS  - continue meropenem 1g q 8 hours   - follow-up Urine Cx 8/24   - still symptomatic with dysuria today -- if noted improvement, will hope to complete course with PO fosfomycin vs macrovid     Please call or message on Microsoft Teams if with any questions.  Spectra [id number]

## 2025-01-07 ENCOUNTER — OFFICE VISIT (OUTPATIENT)
Dept: FAMILY MEDICINE CLINIC | Age: 80
End: 2025-01-07
Payer: MEDICARE

## 2025-01-07 ENCOUNTER — TELEPHONE (OUTPATIENT)
Dept: FAMILY MEDICINE CLINIC | Age: 80
End: 2025-01-07

## 2025-01-07 VITALS
WEIGHT: 248 LBS | BODY MASS INDEX: 33.63 KG/M2 | TEMPERATURE: 97.3 F | HEART RATE: 80 BPM | SYSTOLIC BLOOD PRESSURE: 136 MMHG | DIASTOLIC BLOOD PRESSURE: 82 MMHG

## 2025-01-07 DIAGNOSIS — J06.9 ACUTE UPPER RESPIRATORY INFECTION, UNSPECIFIED: Primary | ICD-10-CM

## 2025-01-07 PROCEDURE — 3079F DIAST BP 80-89 MM HG: CPT | Performed by: FAMILY MEDICINE

## 2025-01-07 PROCEDURE — 1160F RVW MEDS BY RX/DR IN RCRD: CPT | Performed by: FAMILY MEDICINE

## 2025-01-07 PROCEDURE — 1159F MED LIST DOCD IN RCRD: CPT | Performed by: FAMILY MEDICINE

## 2025-01-07 PROCEDURE — 1123F ACP DISCUSS/DSCN MKR DOCD: CPT | Performed by: FAMILY MEDICINE

## 2025-01-07 PROCEDURE — 3075F SYST BP GE 130 - 139MM HG: CPT | Performed by: FAMILY MEDICINE

## 2025-01-07 PROCEDURE — 99213 OFFICE O/P EST LOW 20 MIN: CPT | Performed by: FAMILY MEDICINE

## 2025-01-07 RX ORDER — AZITHROMYCIN 250 MG/1
TABLET, FILM COATED ORAL
Qty: 1 PACKET | Refills: 0 | Status: SHIPPED | OUTPATIENT
Start: 2025-01-07 | End: 2025-01-17

## 2025-01-07 ASSESSMENT — PATIENT HEALTH QUESTIONNAIRE - PHQ9
2. FEELING DOWN, DEPRESSED OR HOPELESS: NOT AT ALL
SUM OF ALL RESPONSES TO PHQ QUESTIONS 1-9: 0
SUM OF ALL RESPONSES TO PHQ QUESTIONS 1-9: 0
SUM OF ALL RESPONSES TO PHQ9 QUESTIONS 1 & 2: 0
SUM OF ALL RESPONSES TO PHQ QUESTIONS 1-9: 0
SUM OF ALL RESPONSES TO PHQ QUESTIONS 1-9: 0
1. LITTLE INTEREST OR PLEASURE IN DOING THINGS: NOT AT ALL

## 2025-01-07 NOTE — PROGRESS NOTES
Subjective:      Patient ID: James Ta is a 79 y.o. male.    Chief Complaint   Patient presents with    Congestion     Productive cough- clear mucous, stuffy head x 4-5 days        Patient presents with:  Congestion: Productive cough- clear mucous, stuffy head x 4-5 days    Here for the above   And Pnd   Did have covid and rsv vaccines  No fever  No myalgia  No v no d   Some sore throat     Sx are mainly head and sinus     YOB: 1945    Date of Visit:  1/7/2025     -- Bee Venom -- Swelling    --  Has epi-pen   -- Penicillins -- Hives and Itching   -- Vicodin [Hydrocodone-Acetaminophen] -- Rash    Current Outpatient Medications:  atorvastatin (LIPITOR) 20 MG tablet, TAKE 1 TABLET BY MOUTH AT BEDTIME, Disp: 90 tablet, Rfl: 2  dilTIAZem (CARDIZEM CD) 240 MG extended release capsule, TAKE 1 CAPSULE BY MOUTH DAILY, Disp: 90 capsule, Rfl: 0  sertraline (ZOLOFT) 25 MG tablet, TAKE 1 TABLET BY MOUTH DAILY, Disp: 30 tablet, Rfl: 3  ELIQUIS 5 MG TABS tablet, TAKE 1 TABLET BY MOUTH TWICE DAILY, Disp: 180 tablet, Rfl: 1  losartan (COZAAR) 100 MG tablet, TAKE 1 TABLET BY MOUTH DAILY, Disp: 90 tablet, Rfl: 1  flecainide (TAMBOCOR) 100 MG tablet, Take 1 tablet by mouth 2 times daily as needed (For symptoms of AFIB lasting longer than 30 mins), Disp: 60 tablet, Rfl: 3  Acetaminophen (TYLENOL PO), Take 650 mg by mouth as needed, Disp: , Rfl:   EPINEPHrine (EPIPEN) 0.3 MG/0.3ML SOAJ injection, Use as directed for allergic reaction, Disp: 1 each, Rfl: 0  Multiple Vitamin (MULTIVITAMIN) capsule, Take 1 capsule by mouth daily, Disp: , Rfl:     No current facility-administered medications for this visit.      ---------------------------               01/07/25                      1518         ---------------------------   BP:          136/82         Site:    Left Upper Arm     Position:     Sitting        Cuff Size:  Medium Adult      Pulse:         80           Temp:   97.3 °F (36.3

## 2025-01-07 NOTE — TELEPHONE ENCOUNTER
coughing for 4 days productive and no fever  (Newest Message First)  View All Conversations on this Encounter  James Ta \"Kapil\"  [facility] [place] [facility] Staff (supporting Won Armendariz MD)3 hours ago (8:37 AM)       Good Morning Doctor Marcello.     I have a cough and no fever.     I dont think it is my lungs.     I am taking tylenol and cough medicine.First day I blew out some yellow out of my nose.      Thankyou

## 2025-01-07 NOTE — PATIENT INSTRUCTIONS
Continue to take fluids  Use over the counter cold remedies  Take the antibiotic   See in February

## 2025-01-21 RX ORDER — DILTIAZEM HYDROCHLORIDE 240 MG/1
240 CAPSULE, COATED, EXTENDED RELEASE ORAL DAILY
Qty: 90 CAPSULE | Refills: 0 | OUTPATIENT
Start: 2025-01-21

## 2025-01-21 RX ORDER — APIXABAN 5 MG/1
TABLET, FILM COATED ORAL
Qty: 180 TABLET | Refills: 3 | Status: SHIPPED | OUTPATIENT
Start: 2025-01-21

## 2025-01-21 NOTE — TELEPHONE ENCOUNTER
Last OV: 12/6/24  Next OV: 12/10/25  Last refill: 7/31/24 #180 1 R/F  Most recent Labs: 7/25/24  Last EKG (if needed): 12/6/24

## 2025-01-24 ENCOUNTER — TELEPHONE (OUTPATIENT)
Dept: FAMILY MEDICINE CLINIC | Age: 80
End: 2025-01-24

## 2025-01-24 DIAGNOSIS — I10 ESSENTIAL HYPERTENSION: ICD-10-CM

## 2025-01-24 DIAGNOSIS — E78.00 PURE HYPERCHOLESTEROLEMIA: Primary | ICD-10-CM

## 2025-01-24 DIAGNOSIS — Z79.01 CHRONIC ANTICOAGULATION: ICD-10-CM

## 2025-01-24 DIAGNOSIS — R73.09 ELEVATED GLUCOSE: ICD-10-CM

## 2025-01-24 NOTE — TELEPHONE ENCOUNTER
Ok   Fast     Diagnosis Orders   1. Pure hypercholesterolemia  Comprehensive Metabolic Panel    Lipid Panel      2. Essential hypertension  Comprehensive Metabolic Panel    Lipid Panel      3. Elevated glucose  Comprehensive Metabolic Panel    Hemoglobin A1C      4. Chronic anticoagulation  CBC with Auto Differential

## 2025-01-24 NOTE — TELEPHONE ENCOUNTER
Pt was seen on 1/7 and per pt at that appt he was told that Dr ALARCON wanted for him to get bw done prior to his 2/14 appt. There are no lab orders in his chart. Pt wants to know if this can be ordered.

## 2025-01-25 DIAGNOSIS — I10 ESSENTIAL HYPERTENSION: ICD-10-CM

## 2025-01-25 DIAGNOSIS — E78.00 PURE HYPERCHOLESTEROLEMIA: ICD-10-CM

## 2025-01-25 DIAGNOSIS — R73.09 ELEVATED GLUCOSE: ICD-10-CM

## 2025-01-25 DIAGNOSIS — Z79.01 CHRONIC ANTICOAGULATION: ICD-10-CM

## 2025-01-25 LAB
ALBUMIN SERPL-MCNC: 4.4 G/DL (ref 3.4–5)
ALBUMIN/GLOB SERPL: 1.7 {RATIO} (ref 1.1–2.2)
ALP SERPL-CCNC: 80 U/L (ref 40–129)
ALT SERPL-CCNC: 33 U/L (ref 10–40)
ANION GAP SERPL CALCULATED.3IONS-SCNC: 10 MMOL/L (ref 3–16)
AST SERPL-CCNC: 21 U/L (ref 15–37)
BASOPHILS # BLD: 0.1 K/UL (ref 0–0.2)
BASOPHILS NFR BLD: 1.1 %
BILIRUB SERPL-MCNC: 1.3 MG/DL (ref 0–1)
BUN SERPL-MCNC: 13 MG/DL (ref 7–20)
CALCIUM SERPL-MCNC: 9.8 MG/DL (ref 8.3–10.6)
CHLORIDE SERPL-SCNC: 106 MMOL/L (ref 99–110)
CHOLEST SERPL-MCNC: 155 MG/DL (ref 0–199)
CO2 SERPL-SCNC: 24 MMOL/L (ref 21–32)
CREAT SERPL-MCNC: 0.8 MG/DL (ref 0.8–1.3)
DEPRECATED RDW RBC AUTO: 13.7 % (ref 12.4–15.4)
EOSINOPHIL # BLD: 0.2 K/UL (ref 0–0.6)
EOSINOPHIL NFR BLD: 3.7 %
GFR SERPLBLD CREATININE-BSD FMLA CKD-EPI: >90 ML/MIN/{1.73_M2}
GLUCOSE SERPL-MCNC: 104 MG/DL (ref 70–99)
HCT VFR BLD AUTO: 43.1 % (ref 40.5–52.5)
HDLC SERPL-MCNC: 58 MG/DL (ref 40–60)
HGB BLD-MCNC: 15 G/DL (ref 13.5–17.5)
LDLC SERPL CALC-MCNC: 71 MG/DL
LYMPHOCYTES # BLD: 1.7 K/UL (ref 1–5.1)
LYMPHOCYTES NFR BLD: 34.4 %
MCH RBC QN AUTO: 33.6 PG (ref 26–34)
MCHC RBC AUTO-ENTMCNC: 34.8 G/DL (ref 31–36)
MCV RBC AUTO: 96.4 FL (ref 80–100)
MONOCYTES # BLD: 0.6 K/UL (ref 0–1.3)
MONOCYTES NFR BLD: 10.9 %
NEUTROPHILS # BLD: 2.5 K/UL (ref 1.7–7.7)
NEUTROPHILS NFR BLD: 49.9 %
PLATELET # BLD AUTO: 182 K/UL (ref 135–450)
PMV BLD AUTO: 8.1 FL (ref 5–10.5)
POTASSIUM SERPL-SCNC: 4.1 MMOL/L (ref 3.5–5.1)
PROT SERPL-MCNC: 7 G/DL (ref 6.4–8.2)
RBC # BLD AUTO: 4.47 M/UL (ref 4.2–5.9)
SODIUM SERPL-SCNC: 140 MMOL/L (ref 136–145)
TRIGL SERPL-MCNC: 132 MG/DL (ref 0–150)
VLDLC SERPL CALC-MCNC: 26 MG/DL
WBC # BLD AUTO: 5.1 K/UL (ref 4–11)

## 2025-01-26 LAB
EST. AVERAGE GLUCOSE BLD GHB EST-MCNC: 122.6 MG/DL
HBA1C MFR BLD: 5.9 %

## 2025-01-26 ASSESSMENT — SLEEP AND FATIGUE QUESTIONNAIRES
HOW LIKELY ARE YOU TO NOD OFF OR FALL ASLEEP WHILE SITTING AND READING: SLIGHT CHANCE OF DOZING
HOW LIKELY ARE YOU TO NOD OFF OR FALL ASLEEP WHILE SITTING QUIETLY AFTER LUNCH WITHOUT ALCOHOL: WOULD NEVER DOZE
HOW LIKELY ARE YOU TO NOD OFF OR FALL ASLEEP WHILE LYING DOWN TO REST IN THE AFTERNOON WHEN CIRCUMSTANCES PERMIT: MODERATE CHANCE OF DOZING
HOW LIKELY ARE YOU TO NOD OFF OR FALL ASLEEP WHILE SITTING INACTIVE IN A PUBLIC PLACE: WOULD NEVER DOZE
HOW LIKELY ARE YOU TO NOD OFF OR FALL ASLEEP WHILE WATCHING TV: SLIGHT CHANCE OF DOZING
HOW LIKELY ARE YOU TO NOD OFF OR FALL ASLEEP WHEN YOU ARE A PASSENGER IN A CAR FOR AN HOUR WITHOUT A BREAK: WOULD NEVER DOZE
HOW LIKELY ARE YOU TO NOD OFF OR FALL ASLEEP WHILE SITTING AND TALKING TO SOMEONE: WOULD NEVER DOZE
HOW LIKELY ARE YOU TO NOD OFF OR FALL ASLEEP WHILE SITTING INACTIVE IN A PUBLIC PLACE: WOULD NEVER DOZE
HOW LIKELY ARE YOU TO NOD OFF OR FALL ASLEEP WHILE SITTING AND TALKING TO SOMEONE: WOULD NEVER DOZE
HOW LIKELY ARE YOU TO NOD OFF OR FALL ASLEEP WHEN YOU ARE A PASSENGER IN A CAR FOR AN HOUR WITHOUT A BREAK: WOULD NEVER DOZE
HOW LIKELY ARE YOU TO NOD OFF OR FALL ASLEEP WHILE WATCHING TV: SLIGHT CHANCE OF DOZING
HOW LIKELY ARE YOU TO NOD OFF OR FALL ASLEEP WHILE SITTING QUIETLY AFTER LUNCH WITHOUT ALCOHOL: WOULD NEVER DOZE
HOW LIKELY ARE YOU TO NOD OFF OR FALL ASLEEP WHILE LYING DOWN TO REST IN THE AFTERNOON WHEN CIRCUMSTANCES PERMIT: MODERATE CHANCE OF DOZING
ESS TOTAL SCORE: 4
HOW LIKELY ARE YOU TO NOD OFF OR FALL ASLEEP WHILE SITTING AND READING: SLIGHT CHANCE OF DOZING
HOW LIKELY ARE YOU TO NOD OFF OR FALL ASLEEP IN A CAR, WHILE STOPPED FOR A FEW MINUTES IN TRAFFIC: WOULD NEVER DOZE
HOW LIKELY ARE YOU TO NOD OFF OR FALL ASLEEP IN A CAR, WHILE STOPPED FOR A FEW MINUTES IN TRAFFIC: WOULD NEVER DOZE

## 2025-01-27 ENCOUNTER — OFFICE VISIT (OUTPATIENT)
Dept: SLEEP MEDICINE | Age: 80
End: 2025-01-27
Payer: MEDICARE

## 2025-01-27 VITALS
HEART RATE: 68 BPM | OXYGEN SATURATION: 98 % | DIASTOLIC BLOOD PRESSURE: 70 MMHG | TEMPERATURE: 97.9 F | SYSTOLIC BLOOD PRESSURE: 120 MMHG | HEIGHT: 74 IN | BODY MASS INDEX: 31.9 KG/M2 | RESPIRATION RATE: 18 BRPM | WEIGHT: 248.6 LBS

## 2025-01-27 DIAGNOSIS — Z99.89 DEPENDENCE ON OTHER ENABLING MACHINES AND DEVICES: ICD-10-CM

## 2025-01-27 DIAGNOSIS — G47.33 OSA ON CPAP: Primary | ICD-10-CM

## 2025-01-27 DIAGNOSIS — E66.811 OBESITY (BMI 30.0-34.9): ICD-10-CM

## 2025-01-27 DIAGNOSIS — I10 ESSENTIAL HYPERTENSION: ICD-10-CM

## 2025-01-27 PROCEDURE — 99214 OFFICE O/P EST MOD 30 MIN: CPT | Performed by: PSYCHIATRY & NEUROLOGY

## 2025-01-27 PROCEDURE — 3078F DIAST BP <80 MM HG: CPT | Performed by: PSYCHIATRY & NEUROLOGY

## 2025-01-27 PROCEDURE — G2211 COMPLEX E/M VISIT ADD ON: HCPCS | Performed by: PSYCHIATRY & NEUROLOGY

## 2025-01-27 PROCEDURE — 1160F RVW MEDS BY RX/DR IN RCRD: CPT | Performed by: PSYCHIATRY & NEUROLOGY

## 2025-01-27 PROCEDURE — 1123F ACP DISCUSS/DSCN MKR DOCD: CPT | Performed by: PSYCHIATRY & NEUROLOGY

## 2025-01-27 PROCEDURE — 3074F SYST BP LT 130 MM HG: CPT | Performed by: PSYCHIATRY & NEUROLOGY

## 2025-01-27 PROCEDURE — 1159F MED LIST DOCD IN RCRD: CPT | Performed by: PSYCHIATRY & NEUROLOGY

## 2025-01-27 NOTE — PROGRESS NOTES
ttl pk-yrs)     Types: Cigarettes     Start date: 1980     Quit date: 1990     Years since quittin.0     Passive exposure: Past    Smokeless tobacco: Never    Tobacco comments:     Occasional cigar at this time   Vaping Use    Vaping status: Never Used   Substance and Sexual Activity    Alcohol use: Yes     Alcohol/week: 7.0 standard drinks of alcohol     Types: 7 Shots of liquor per week     Comment: 1 shots/day    Drug use: Not Currently    Sexual activity: Yes   Other Topics Concern    Not on file   Social History Narrative    Not on file     Social Determinants of Health     Financial Resource Strain: Low Risk  (2024)    Overall Financial Resource Strain (CARDIA)     Difficulty of Paying Living Expenses: Not very hard   Food Insecurity: No Food Insecurity (2024)    Hunger Vital Sign     Worried About Running Out of Food in the Last Year: Never true     Ran Out of Food in the Last Year: Never true   Transportation Needs: Unknown (2024)    PRAPARE - Transportation     Lack of Transportation (Medical): Not on file     Lack of Transportation (Non-Medical): No   Physical Activity: Insufficiently Active (2024)    Exercise Vital Sign     Days of Exercise per Week: 3 days     Minutes of Exercise per Session: 20 min   Stress: Not on file   Social Connections: Not on file   Intimate Partner Violence: Unknown (2024)    [facility]    Interpersonal Safety     Feel physically or emotionally unsafe where currently live: Not on file     Harm by anyone: Not on file     Emotionally Harmed: Not on file   Housing Stability: Unknown (2024)    Housing Stability Vital Sign     Unable to Pay for Housing in the Last Year: Not on file     Number of Places Lived in the Last Year: Not on file     Unstable Housing in the Last Year: No       Prior to Admission medications    Medication Sig Start Date End Date

## 2025-01-27 NOTE — PROGRESS NOTES
James Ta         : 1945  [x] [facility]     [] [facility]      [] [facility]     [][facility]    [] [facility]  [] [facility]  [] [facility]   [] [facility] services [] Other:  Diagnosis: [x] JOELLEN (G47.33) [] CSA (G47.31) [] Apnea (G47.30)   Length of Need: [] 12 Months [x] 99 Months [] Other:    Machine (KALPESH!):  [x] [employer]Med AirSense     Auto [] Other:       Humidifier: [x] Heated ()        [x] Water chamber replacement ()/ 1 per 6 months        Mask:  Please always start with the mask the patient used during the titraion   [x] Nasal () /1 per 3 months    [x] Patient choice -Size and fit mask    [] Dispense:     [x] Headgear () / 1 per 6 months        [x] Replacement Nasal Pillows ()/2 per month         Tubing: [x] Heated ()/1 per 3 months    [] Standard ()/1 per 3 months [] Other:           Filters: [x] Non-disposable ()/1 per 6 months     [x] Ultra-Fine, Disposable ()/2 per month        Miscellaneous: [x] Chin Strap ()/ 1 per 6 months [] O2 bleed-in:       LPM   [] Oximetry on CPAP/Bilevel []  Other:          Start Order Date: 25    MEDICAL JUSTIFICATION:  I, the undersigned, certify that the above prescribed supplies are medically necessary for this patient’s wellbeing.  In my opinion, the supplies are both reasonable and necessary in reference to accepted standards of medicalpractice in treatment of this patient’s condition.    Lawson Hurt MD      NPI: [id number]       Order Signed Date: 25    Electronically signed by Lawson Hurt MD on 2025 at 8:05 AM

## 2025-02-11 SDOH — ECONOMIC STABILITY: FOOD INSECURITY: WITHIN THE PAST 12 MONTHS, YOU WORRIED THAT YOUR FOOD WOULD RUN OUT BEFORE YOU GOT MONEY TO BUY MORE.: NEVER TRUE

## 2025-02-11 SDOH — HEALTH STABILITY: PHYSICAL HEALTH: ON AVERAGE, HOW MANY DAYS PER WEEK DO YOU ENGAGE IN MODERATE TO STRENUOUS EXERCISE (LIKE A BRISK WALK)?: 3 DAYS

## 2025-02-11 SDOH — ECONOMIC STABILITY: INCOME INSECURITY: IN THE LAST 12 MONTHS, WAS THERE A TIME WHEN YOU WERE NOT ABLE TO PAY THE MORTGAGE OR RENT ON TIME?: NO

## 2025-02-11 SDOH — ECONOMIC STABILITY: FOOD INSECURITY: WITHIN THE PAST 12 MONTHS, THE FOOD YOU BOUGHT JUST DIDN'T LAST AND YOU DIDN'T HAVE MONEY TO GET MORE.: NEVER TRUE

## 2025-02-11 SDOH — HEALTH STABILITY: PHYSICAL HEALTH: ON AVERAGE, HOW MANY MINUTES DO YOU ENGAGE IN EXERCISE AT THIS LEVEL?: 20 MIN

## 2025-02-11 SDOH — ECONOMIC STABILITY: TRANSPORTATION INSECURITY
IN THE PAST 12 MONTHS, HAS THE LACK OF TRANSPORTATION KEPT YOU FROM MEDICAL APPOINTMENTS OR FROM GETTING MEDICATIONS?: NO

## 2025-02-11 ASSESSMENT — PATIENT HEALTH QUESTIONNAIRE - PHQ9
SUM OF ALL RESPONSES TO PHQ QUESTIONS 1-9: 0
SUM OF ALL RESPONSES TO PHQ9 QUESTIONS 1 & 2: 0
2. FEELING DOWN, DEPRESSED OR HOPELESS: NOT AT ALL
1. LITTLE INTEREST OR PLEASURE IN DOING THINGS: NOT AT ALL

## 2025-02-11 ASSESSMENT — LIFESTYLE VARIABLES
HOW OFTEN DO YOU HAVE A DRINK CONTAINING ALCOHOL: 2-3 TIMES A WEEK
HOW OFTEN DO YOU HAVE SIX OR MORE DRINKS ON ONE OCCASION: 1
HOW OFTEN DO YOU HAVE A DRINK CONTAINING ALCOHOL: 4
HOW MANY STANDARD DRINKS CONTAINING ALCOHOL DO YOU HAVE ON A TYPICAL DAY: 1 OR 2
HOW MANY STANDARD DRINKS CONTAINING ALCOHOL DO YOU HAVE ON A TYPICAL DAY: 1

## 2025-02-14 ENCOUNTER — OFFICE VISIT (OUTPATIENT)
Dept: FAMILY MEDICINE CLINIC | Age: 80
End: 2025-02-14
Payer: MEDICARE

## 2025-02-14 VITALS
SYSTOLIC BLOOD PRESSURE: 122 MMHG | DIASTOLIC BLOOD PRESSURE: 76 MMHG | HEIGHT: 74 IN | TEMPERATURE: 98.1 F | BODY MASS INDEX: 31.29 KG/M2 | WEIGHT: 243.8 LBS | HEART RATE: 72 BPM

## 2025-02-14 DIAGNOSIS — I71.40 ABDOMINAL AORTIC ANEURYSM (AAA) WITHOUT RUPTURE, UNSPECIFIED PART (HCC): ICD-10-CM

## 2025-02-14 DIAGNOSIS — E78.5 ELEVATED LIPIDS: ICD-10-CM

## 2025-02-14 DIAGNOSIS — I10 ESSENTIAL HYPERTENSION: Primary | ICD-10-CM

## 2025-02-14 DIAGNOSIS — I48.0 PAF (PAROXYSMAL ATRIAL FIBRILLATION) (HCC): ICD-10-CM

## 2025-02-14 DIAGNOSIS — Z86.0100 HX OF COLONIC POLYPS: ICD-10-CM

## 2025-02-14 PROCEDURE — 1160F RVW MEDS BY RX/DR IN RCRD: CPT | Performed by: FAMILY MEDICINE

## 2025-02-14 PROCEDURE — 1123F ACP DISCUSS/DSCN MKR DOCD: CPT | Performed by: FAMILY MEDICINE

## 2025-02-14 PROCEDURE — 1159F MED LIST DOCD IN RCRD: CPT | Performed by: FAMILY MEDICINE

## 2025-02-14 PROCEDURE — 3078F DIAST BP <80 MM HG: CPT | Performed by: FAMILY MEDICINE

## 2025-02-14 PROCEDURE — 99214 OFFICE O/P EST MOD 30 MIN: CPT | Performed by: FAMILY MEDICINE

## 2025-02-14 PROCEDURE — 3074F SYST BP LT 130 MM HG: CPT | Performed by: FAMILY MEDICINE

## 2025-02-14 RX ORDER — SERTRALINE HYDROCHLORIDE 25 MG/1
25 TABLET, FILM COATED ORAL DAILY
Qty: 90 TABLET | Refills: 1 | Status: SHIPPED | OUTPATIENT
Start: 2025-02-14

## 2025-02-14 ASSESSMENT — ENCOUNTER SYMPTOMS
ABDOMINAL PAIN: 0
CONSTIPATION: 0
SHORTNESS OF BREATH: 0
BLOOD IN STOOL: 0
TROUBLE SWALLOWING: 0
DIARRHEA: 0

## 2025-02-14 NOTE — PATIENT INSTRUCTIONS
Remember a ct or ultrasound of the abdomen in about 2027   See dr sullivan about September this year to repeat a colonoscopy  Continue the diet and the medicines  See in about 6 months

## 2025-02-14 NOTE — PROGRESS NOTES
Position:         Sitting            Cuff Size:      Medium Adult          Pulse:             72               Temp:       98.1 °F (36.7 °C)       TempSrc:        Temporal            Weight: 110.6 kg (243 lb 12.8 oz)   Height:     1.867 m (6' 1.5\")      -----------------------------------  Body mass index is 31.73 kg/m².     Wt Readings from Last 3 Encounters:  02/14/25 : 110.6 kg (243 lb 12.8 oz)  01/27/25 : 112.8 kg (248 lb 9.6 oz)  01/07/25 : 112.5 kg (248 lb)    BP Readings from Last 3 Encounters:  02/14/25 : 122/76  01/27/25 : 120/70  01/07/25 : 136/82            Review of Systems   Constitutional:  Negative for chills and fever.   HENT:  Negative for trouble swallowing.    Respiratory:  Negative for shortness of breath.    Cardiovascular:  Negative for chest pain.   Gastrointestinal:  Negative for abdominal pain, blood in stool, constipation and diarrhea.        No gerd no dysphagia    Genitourinary:  Negative for difficulty urinating, dysuria and hematuria.   Neurological:  Negative for dizziness and headaches.       Objective:   Physical Exam  Constitutional:       General: He is not in acute distress.     Appearance: Normal appearance. He is well-developed. He is not ill-appearing or diaphoretic.   Eyes:      General: No scleral icterus.  Neck:      Thyroid: No thyroid mass or thyromegaly.      Vascular: No carotid bruit.   Cardiovascular:      Rate and Rhythm: Normal rate and regular rhythm.      Heart sounds: Murmur heard.      Systolic murmur is present with a grade of 1/6.      No friction rub. No gallop.      Comments:   Very slight murmur along sternum   Pulmonary:      Effort: Pulmonary effort is normal. No tachypnea, accessory muscle usage or respiratory distress.      Breath sounds: Normal breath sounds. No decreased breath sounds, wheezing, rhonchi or rales.   Abdominal:      General: Bowel sounds are normal. There is no distension or abdominal bruit.      Palpations: Abdomen

## 2025-02-20 RX ORDER — LOSARTAN POTASSIUM 100 MG/1
100 TABLET ORAL DAILY
Qty: 90 TABLET | Refills: 1 | Status: SHIPPED | OUTPATIENT
Start: 2025-02-20

## 2025-02-21 ENCOUNTER — OFFICE VISIT (OUTPATIENT)
Dept: FAMILY MEDICINE CLINIC | Age: 80
End: 2025-02-21
Payer: MEDICARE

## 2025-02-21 VITALS
TEMPERATURE: 98.1 F | WEIGHT: 247 LBS | SYSTOLIC BLOOD PRESSURE: 130 MMHG | BODY MASS INDEX: 31.7 KG/M2 | DIASTOLIC BLOOD PRESSURE: 75 MMHG | HEIGHT: 74 IN

## 2025-02-21 DIAGNOSIS — Z00.00 MEDICARE ANNUAL WELLNESS VISIT, SUBSEQUENT: Primary | ICD-10-CM

## 2025-02-21 PROCEDURE — 1159F MED LIST DOCD IN RCRD: CPT | Performed by: FAMILY MEDICINE

## 2025-02-21 PROCEDURE — 3075F SYST BP GE 130 - 139MM HG: CPT | Performed by: FAMILY MEDICINE

## 2025-02-21 PROCEDURE — 3078F DIAST BP <80 MM HG: CPT | Performed by: FAMILY MEDICINE

## 2025-02-21 PROCEDURE — 1123F ACP DISCUSS/DSCN MKR DOCD: CPT | Performed by: FAMILY MEDICINE

## 2025-02-21 PROCEDURE — G0439 PPPS, SUBSEQ VISIT: HCPCS | Performed by: FAMILY MEDICINE

## 2025-02-21 ASSESSMENT — PATIENT HEALTH QUESTIONNAIRE - PHQ9
2. FEELING DOWN, DEPRESSED OR HOPELESS: NOT AT ALL
1. LITTLE INTEREST OR PLEASURE IN DOING THINGS: NOT AT ALL
SUM OF ALL RESPONSES TO PHQ9 QUESTIONS 1 & 2: 0
SUM OF ALL RESPONSES TO PHQ QUESTIONS 1-9: 0

## 2025-02-21 ASSESSMENT — LIFESTYLE VARIABLES
HOW MANY STANDARD DRINKS CONTAINING ALCOHOL DO YOU HAVE ON A TYPICAL DAY: 1 OR 2
HOW OFTEN DO YOU HAVE A DRINK CONTAINING ALCOHOL: 2-3 TIMES A WEEK

## 2025-02-21 NOTE — PROGRESS NOTES
Medicare Annual Wellness Visit    James Ta is here for Medicare AWV    Assessment & Plan   Medicare annual wellness visit, subsequent     Return in 1 year (on 2/21/2026) for Medicare AWV.     Subjective   Medicare AWV    Patient's complete Health Risk Assessment and screening values have been reviewed and are found in Flowsheets. The following problems were reviewed today and where indicated follow up appointments were made and/or referrals ordered.    Positive Risk Factor Screenings with Interventions:                Abnormal BMI (obese):  Body mass index is 32.14 kg/m². (!) Abnormal  Interventions:  See AVS for additional education material                           Objective   Vitals:    02/21/25 0907   BP: 130/75   Site: Left Upper Arm   Position: Sitting   Cuff Size: Medium Adult   Temp: 98.1 °F (36.7 °C)   Weight: 112 kg (247 lb)   Height: 1.867 m (6' 1.5\")      Body mass index is 32.14 kg/m².                    Allergies   Allergen Reactions    Bee Venom Swelling     Has epi-pen    Penicillins Hives and Itching    Vicodin [Hydrocodone-Acetaminophen] Rash     Prior to Visit Medications    Medication Sig Taking? Authorizing Provider   losartan (COZAAR) 100 MG tablet TAKE 1 TABLET BY MOUTH DAILY Yes Won Armendariz MD   sertraline (ZOLOFT) 25 MG tablet Take 1 tablet by mouth daily Yes Won Armendariz MD   ELIQUIS 5 MG TABS tablet TAKE 1 TABLET BY MOUTH TWICE DAILY Yes Alli Sullivan MD   atorvastatin (LIPITOR) 20 MG tablet TAKE 1 TABLET BY MOUTH AT BEDTIME Yes Won Armendariz MD   dilTIAZem (CARDIZEM CD) 240 MG extended release capsule TAKE 1 CAPSULE BY MOUTH DAILY Yes Won Armendariz MD   flecainide (TAMBOCOR) 100 MG tablet Take 1 tablet by mouth 2 times daily as needed (For symptoms of AFIB lasting longer than 30 mins) Yes Alli Sullivan MD   Acetaminophen (TYLENOL PO) Take 650 mg by mouth as needed Yes Kade, MD [place]   Multiple Vitamin (MULTIVITAMIN) capsule Take 1 capsule by mouth daily Yes Kade

## 2025-02-21 NOTE — PATIENT INSTRUCTIONS
attack. These may include:    Chest pain or pressure, or a strange feeling in the chest.     Sweating.     Shortness of breath.     Pain, pressure, or a strange feeling in the back, neck, jaw, or upper belly or in one or both shoulders or arms.     Lightheadedness or sudden weakness.     A fast or irregular heartbeat.   After you call 911, the  may tell you to chew 1 adult-strength or 2 to 4 low-dose aspirin. Wait for an ambulance. Do not try to drive yourself.  Watch closely for changes in your health, and be sure to contact your doctor if you have any problems.  Where can you learn more?  Go to https://www.[employer].net/patientEd and enter F075 to learn more about \"A Healthy Heart: Care Instructions.\"  Current as of: July 31, 2024  Content Version: 14.3  © 2024 [employer].   Care instructions adapted under license by [employer]. If you have questions about a medical condition or this instruction, always ask your healthcare professional. [employer] disclaims any warranty or liability for your use of this information.    Personalized Preventive Plan for James Ta - 2/21/2025  Medicare offers a range of preventive health benefits. Some of the tests and screenings are paid in full while other may be subject to a deductible, co-insurance, and/or copay.  Some of these benefits include a comprehensive review of your medical history including lifestyle, illnesses that may run in your family, and various assessments and screenings as appropriate.  After reviewing your medical record and screening and assessments performed today your provider may have ordered immunizations, labs, imaging, and/or referrals for you.  A list of these orders (if applicable) as well as your Preventive Care list are included within your After Visit Summary for your review.

## 2025-02-27 RX ORDER — DILTIAZEM HYDROCHLORIDE 240 MG/1
240 CAPSULE, COATED, EXTENDED RELEASE ORAL DAILY
Qty: 90 CAPSULE | Refills: 1 | Status: SHIPPED | OUTPATIENT
Start: 2025-02-27

## 2025-05-29 ENCOUNTER — OFFICE VISIT (OUTPATIENT)
Dept: ORTHOPEDIC SURGERY | Age: 80
End: 2025-05-29

## 2025-05-29 VITALS — WEIGHT: 236.4 LBS | HEIGHT: 74 IN | BODY MASS INDEX: 30.34 KG/M2

## 2025-05-29 DIAGNOSIS — M25.562 LEFT KNEE PAIN, UNSPECIFIED CHRONICITY: Primary | ICD-10-CM

## 2025-05-29 DIAGNOSIS — S83.92XA SPRAIN OF LEFT KNEE, UNSPECIFIED LIGAMENT, INITIAL ENCOUNTER: ICD-10-CM

## 2025-05-29 DIAGNOSIS — M25.552 LEFT HIP PAIN: ICD-10-CM

## 2025-05-29 RX ORDER — METHYLPREDNISOLONE 4 MG/1
TABLET ORAL
Qty: 1 KIT | Refills: 0 | Status: SHIPPED | OUTPATIENT
Start: 2025-05-29

## 2025-05-29 NOTE — PROGRESS NOTES
[facility] Office Visit  Phi Falk MD    Reason for visit: Left hip and knee pain    HPI:  James Ta is a 80 y.o. who is here for initial evaluation of left hip and knee pain.  He has had symptoms for the last 1 month with no inciting event.  He was seen here in the fall for right hip issues and the right leg is doing better.  There is no history of injury or surgery to the left leg.  He reports mostly anterior knee pain and thigh pain.  Symptoms are worse with walking and on steps.  He denies groin pain.  He has had back pain that radiates down his leg in the past but not recently.  He is unable to take NSAIDs due to being on a blood thinner for atrial fibrillation.  He takes Tylenol as needed for pain control.  He has occasional numbness in his left foot.  He also reports lateral hip pain.    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     Chronic kidney disease     Dermatophytosis of nail     Disturbance of skin sensation     Hypercholesteremia     Hypertension     Impaired fasting glucose     Kidney stone     Routine general medical examination at a health care facility     Sleep apnea     Special screening for malignant neoplasm of prostate     TGA (transient global amnesia)     Weight gain        Exam:  Ht 1.867 m (6' 1.5\")   Wt 107.2 kg (236 lb 6.4 oz)   BMI 30.77 kg/m²     Appearance: sitting in exam room chair, appears to be in no acute distress, awake and alert  Resp: unlabored breathing on room air  Skin: warm, dry and intact with out erythema or significant increased temperature  Neuro: grossly intact both lower extremities. Intact sensation to light touch. Motor exam 4+ to 5/5 in all major motor groups.  LLE: Negative Stinchfield of the hip. Examination reveals that active knee range of

## 2025-06-06 ENCOUNTER — PATIENT MESSAGE (OUTPATIENT)
Dept: ORTHOPEDIC SURGERY | Age: 80
End: 2025-06-06

## 2025-06-06 ENCOUNTER — TELEPHONE (OUTPATIENT)
Dept: ORTHOPEDIC SURGERY | Age: 80
End: 2025-06-06

## 2025-06-06 DIAGNOSIS — S83.92XA SPRAIN OF LEFT KNEE, UNSPECIFIED LIGAMENT, INITIAL ENCOUNTER: Primary | ICD-10-CM

## 2025-06-06 RX ORDER — TRAMADOL HYDROCHLORIDE 50 MG/1
50 TABLET ORAL EVERY 6 HOURS PRN
Qty: 28 TABLET | Refills: 0 | Status: SHIPPED | OUTPATIENT
Start: 2025-06-06 | End: 2025-06-13

## 2025-06-06 NOTE — TELEPHONE ENCOUNTER
Spoke with patient and told him that his MRI was approved and gave him the number to call.  His medication was sent too the pharmacy. He will call and make an appt to see Dr Falk after the MRI is done. He is aware Dr Falk will be pout the week of the 9th.

## 2025-06-10 ENCOUNTER — HOSPITAL ENCOUNTER (OUTPATIENT)
Dept: MRI IMAGING | Age: 80
Discharge: HOME | End: 2025-06-10
Attending: ORTHOPAEDIC SURGERY
Payer: MEDICARE

## 2025-06-10 DIAGNOSIS — S83.92XA SPRAIN OF LEFT KNEE, UNSPECIFIED LIGAMENT, INITIAL ENCOUNTER: ICD-10-CM

## 2025-06-10 PROCEDURE — 73721 MRI JNT OF LWR EXTRE W/O DYE: CPT

## 2025-06-20 ENCOUNTER — OFFICE VISIT (OUTPATIENT)
Dept: ORTHOPEDIC SURGERY | Age: 80
End: 2025-06-20
Payer: MEDICARE

## 2025-06-20 VITALS — BODY MASS INDEX: 30.48 KG/M2 | HEIGHT: 73 IN | WEIGHT: 230 LBS

## 2025-06-20 DIAGNOSIS — M76.892 TENDINITIS OF LEFT QUADRICEPS TENDON: Primary | ICD-10-CM

## 2025-06-20 PROCEDURE — 1125F AMNT PAIN NOTED PAIN PRSNT: CPT | Performed by: ORTHOPAEDIC SURGERY

## 2025-06-20 PROCEDURE — 1159F MED LIST DOCD IN RCRD: CPT | Performed by: ORTHOPAEDIC SURGERY

## 2025-06-20 PROCEDURE — 1123F ACP DISCUSS/DSCN MKR DOCD: CPT | Performed by: ORTHOPAEDIC SURGERY

## 2025-06-20 PROCEDURE — 99213 OFFICE O/P EST LOW 20 MIN: CPT | Performed by: ORTHOPAEDIC SURGERY

## 2025-06-20 PROCEDURE — 1160F RVW MEDS BY RX/DR IN RCRD: CPT | Performed by: ORTHOPAEDIC SURGERY

## 2025-06-20 RX ORDER — TRAMADOL HYDROCHLORIDE 50 MG/1
50 TABLET ORAL EVERY 6 HOURS PRN
Qty: 28 TABLET | Refills: 0 | Status: SHIPPED | OUTPATIENT
Start: 2025-06-20 | End: 2025-06-27

## 2025-06-20 NOTE — PROGRESS NOTES
[facility] Orthopedics Office Visit  Phi Falk MD    Reason for visit: Left knee pain    HPI:  James Ta is a 80 y.o. who is here in follow-up of left hip and knee pain.  He was initially seen for this issue 3 weeks ago.  For review, he has had symptoms for the last 2 months with no inciting event.  There is no history of injury or surgery to the left leg.  He reports mostly anterior knee pain and thigh pain.  Symptoms are worse with walking and on steps.  He denies groin pain.  He is unable to take NSAIDs due to being on a blood thinner for atrial fibrillation.  He takes Tylenol and tramadol as needed for pain control.  He has occasional numbness in his left foot.    He has been treated with a knee brace and has mild relief of symptoms.  He underwent an MRI and comes in today to discuss the results.    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     Chronic kidney disease     Dermatophytosis of nail     Disturbance of skin sensation     Hypercholesteremia     Hypertension     Impaired fasting glucose     Kidney stone     Routine general medical examination at a health care facility     Sleep apnea     Special screening for malignant neoplasm of prostate     TGA (transient global amnesia)     Weight gain        Exam:  Ht 1.854 m (6' 1\")   Wt 104.3 kg (230 lb)   BMI 30.34 kg/m²     Appearance: sitting in exam room chair, appears to be in no acute distress, awake and alert  Resp: unlabored breathing on room air  Skin: warm, dry and intact with out erythema or significant increased temperature  Neuro: grossly intact both lower extremities. Intact sensation to light touch. Motor exam 4+ to 5/5 in all major motor groups.  LLE: Negative Stinchfield of the hip. Examination reveals that active knee range of motion is 5

## 2025-07-01 ENCOUNTER — HOSPITAL ENCOUNTER (OUTPATIENT)
Dept: PHYSICAL THERAPY | Age: 80
Setting detail: THERAPIES SERIES
Discharge: HOME | End: 2025-07-01
Attending: ORTHOPAEDIC SURGERY
Payer: MEDICARE

## 2025-07-01 DIAGNOSIS — M25.562 CHRONIC PAIN OF LEFT KNEE: ICD-10-CM

## 2025-07-01 DIAGNOSIS — G89.29 CHRONIC PAIN OF LEFT KNEE: ICD-10-CM

## 2025-07-01 DIAGNOSIS — M17.12 PRIMARY OSTEOARTHRITIS OF LEFT KNEE: Primary | ICD-10-CM

## 2025-07-01 DIAGNOSIS — M76.892 TENDINITIS OF LEFT QUADRICEPS TENDON: ICD-10-CM

## 2025-07-01 PROCEDURE — 97110 THERAPEUTIC EXERCISES: CPT | Performed by: PHYSICAL THERAPIST

## 2025-07-01 PROCEDURE — 97161 PT EVAL LOW COMPLEX 20 MIN: CPT | Performed by: PHYSICAL THERAPIST

## 2025-07-01 PROCEDURE — 97530 THERAPEUTIC ACTIVITIES: CPT | Performed by: PHYSICAL THERAPIST

## 2025-07-01 NOTE — PLAN OF CARE
[facility] - Outpatient Rehabilitation and Therapy: [address], [place] [address] office: [phone] fax: [phone]     Physical Therapy Initial Evaluation Certification      Dear Phi Falk MD ,    We had the pleasure of evaluating the following patient for physical therapy services at [facility].  A summary of our findings can be found in the initial assessment below.  This includes our plan of care.  If you have any questions or concerns regarding these findings, please do not hesitate to contact me at the office phone number listed above.  Thank you for the referral.     Physician Signature:_______________________________Date:__________________  By signing above (or electronic signature), therapist’s plan is approved by physician       Physical Therapy: TREATMENT/PROGRESS NOTE   Patient: James Ta (80 y.o. male)   Examination Date: 2025   :  1945 MRN: [id number]   Visit #: 1   Insurance Allowable Auth Needed   BMN  Requires Auth through [employer] [x]Yes    []No    Insurance: Payor: [place] [facility] MEDICARE / Plan: [place] [facility] MEDICARE / Product Type: *No Product type* /   Insurance ID: [id number] - (Medicare Managed)  Secondary Insurance (if applicable):    Treatment Diagnosis:     ICD-10-CM    1. Primary osteoarthritis of left knee  M17.12       2. Tendinitis of left quadriceps tendon  M76.892       3. Chronic pain of left knee  M25.562     G89.29          Medical Diagnosis:  Tendinitis of left quadriceps tendon [M76.892]   Referring Physician: Phi Falk MD  PCP: Aracely Spangler MD     Plan of care signed (Y/N):     Date of Patient follow up with Physician:      Plan of Care Report: EVAL today  POC update due: (10 visits /OR AUTH LIMITS, whichever is less)   2025                                             Medical History:  Comorbidities:  Cancer/Tumor  Hypertension  Anxiety  Other: hx of a-fib and ablation  Relevant Medical History:

## 2025-07-02 RX ORDER — LOSARTAN POTASSIUM 100 MG/1
100 TABLET ORAL DAILY
Qty: 90 TABLET | Refills: 0 | Status: SHIPPED | OUTPATIENT
Start: 2025-07-02

## 2025-07-02 RX ORDER — DILTIAZEM HYDROCHLORIDE 240 MG/1
240 CAPSULE, COATED, EXTENDED RELEASE ORAL DAILY
Qty: 90 CAPSULE | Refills: 0 | Status: SHIPPED | OUTPATIENT
Start: 2025-07-02

## 2025-07-02 RX ORDER — SERTRALINE HYDROCHLORIDE 25 MG/1
25 TABLET, FILM COATED ORAL DAILY
Qty: 90 TABLET | Refills: 0 | Status: SHIPPED | OUTPATIENT
Start: 2025-07-02

## 2025-07-03 DIAGNOSIS — E78.00 PURE HYPERCHOLESTEROLEMIA: ICD-10-CM

## 2025-07-03 RX ORDER — ATORVASTATIN CALCIUM 20 MG/1
TABLET, FILM COATED ORAL
Qty: 90 TABLET | Refills: 0 | Status: SHIPPED | OUTPATIENT
Start: 2025-07-03

## 2025-07-07 ENCOUNTER — PATIENT MESSAGE (OUTPATIENT)
Dept: ORTHOPEDIC SURGERY | Age: 80
End: 2025-07-07

## 2025-07-07 DIAGNOSIS — M76.892 TENDINITIS OF LEFT QUADRICEPS TENDON: Primary | ICD-10-CM

## 2025-07-07 RX ORDER — LIDOCAINE 50 MG/G
1 PATCH TOPICAL DAILY
Qty: 30 PATCH | Refills: 0 | Status: SHIPPED | OUTPATIENT
Start: 2025-07-07 | End: 2025-08-06

## 2025-07-10 NOTE — TELEPHONE ENCOUNTER
Prescription Refill      Medication Name: PAIN MEDS  Pharmacy: [facility]  Patient Contact Number: [phone]    Please call patient to make aware once meds are sent      Hx of depression on sertraline 50mg qd   - c/w home meds

## 2025-07-11 ENCOUNTER — HOSPITAL ENCOUNTER (OUTPATIENT)
Dept: PHYSICAL THERAPY | Age: 80
Setting detail: THERAPIES SERIES
Discharge: HOME | End: 2025-07-11
Attending: ORTHOPAEDIC SURGERY
Payer: MEDICARE

## 2025-07-11 DIAGNOSIS — G89.29 CHRONIC PAIN OF LEFT KNEE: ICD-10-CM

## 2025-07-11 DIAGNOSIS — M76.892 TENDINITIS OF LEFT QUADRICEPS TENDON: ICD-10-CM

## 2025-07-11 DIAGNOSIS — M25.562 CHRONIC PAIN OF LEFT KNEE: ICD-10-CM

## 2025-07-11 DIAGNOSIS — M17.12 PRIMARY OSTEOARTHRITIS OF LEFT KNEE: Primary | ICD-10-CM

## 2025-07-11 PROCEDURE — 97530 THERAPEUTIC ACTIVITIES: CPT | Performed by: PHYSICAL THERAPIST

## 2025-07-11 PROCEDURE — 97110 THERAPEUTIC EXERCISES: CPT | Performed by: PHYSICAL THERAPIST

## 2025-07-11 NOTE — FLOWSHEET NOTE
[facility] - Outpatient [facility] and Therapy: [address], [place] [address] office: [phone] fax: [phone]     Physical Therapy Initial Evaluation Certification      Physical Therapy: TREATMENT/PROGRESS NOTE   Patient: James Ta (80 y.o. male)   Examination Date: 2025   :  1945 MRN: [id number]   Visit #: 2 of 9  Insurance Allowable Auth Needed   BMN  Requires Auth through [employer]  Exp 2025 [x]Yes    []No    Insurance: Payor: [place] [facility] MEDICARE / Plan: [place] [facility] MEDICARE / Product Type: *No Product type* /   Insurance ID: [id number] - (Medicare Managed)  Secondary Insurance (if applicable):    Treatment Diagnosis:     ICD-10-CM    1. Primary osteoarthritis of left knee  M17.12       2. Tendinitis of left quadriceps tendon  M76.892       3. Chronic pain of left knee  M25.562     G89.29          Medical Diagnosis:  Tendinitis of left quadriceps tendon [M76.892]   Referring Physician: Phi Falk MD  PCP: Aracely Spangler MD     Plan of care signed (Y/N):     Date of Patient follow up with Physician:      Plan of Care Report: NO  POC update due: (10 visits /OR AUTH LIMITS, whichever is less)   2025                                             Medical History:  Comorbidities:  Cancer/Tumor  Hypertension  Anxiety  Other: hx of a-fib and ablation  Relevant Medical History: atrial fib                                         Precautions/ Contra-indications:           Latex allergy:  NO  Pacemaker:    NO  Contraindications for Manipulation: None  Date of Surgery: NA  Other:    Red Flags:  None    Suicide Screening:   The patient did not verbalize a primary behavioral concern, suicidal ideation, suicidal intent, or demonstrate suicidal behaviors.    Preferred Language for Healthcare:   [x] English       [] other:    SUBJECTIVE EXAMINATION     Patient stated complaint:   He reports that he has been doing his exercises at home and feels a bit better.     He reports

## 2025-07-16 ENCOUNTER — APPOINTMENT (OUTPATIENT)
Dept: PHYSICAL THERAPY | Age: 80
End: 2025-07-16
Attending: ORTHOPAEDIC SURGERY
Payer: MEDICARE

## 2025-07-25 ENCOUNTER — HOSPITAL ENCOUNTER (OUTPATIENT)
Dept: PHYSICAL THERAPY | Age: 80
Setting detail: THERAPIES SERIES
Discharge: HOME | End: 2025-07-25
Attending: ORTHOPAEDIC SURGERY
Payer: MEDICARE

## 2025-07-25 DIAGNOSIS — M17.12 PRIMARY OSTEOARTHRITIS OF LEFT KNEE: Primary | ICD-10-CM

## 2025-07-25 DIAGNOSIS — M25.562 CHRONIC PAIN OF LEFT KNEE: ICD-10-CM

## 2025-07-25 DIAGNOSIS — G89.29 CHRONIC PAIN OF LEFT KNEE: ICD-10-CM

## 2025-07-25 PROCEDURE — 97110 THERAPEUTIC EXERCISES: CPT | Performed by: PHYSICAL THERAPIST

## 2025-07-25 PROCEDURE — 97530 THERAPEUTIC ACTIVITIES: CPT | Performed by: PHYSICAL THERAPIST

## 2025-07-25 NOTE — FLOWSHEET NOTE
[facility] - Outpatient Rehabilitation and Therapy: [address], [place] [address] office: [phone] fax: [phone]     Physical Therapy Initial Evaluation Certification      Physical Therapy: TREATMENT/PROGRESS NOTE   Patient: James Ta (80 y.o. male)   Examination Date: 2025   :  1945 MRN: [id number]   Visit #: 3 of 9  Insurance Allowable Auth Needed   BMN  Requires Auth through [employer]  Exp 2025 [x]Yes    []No    Insurance: Payor: [place] [facility] MEDICARE / Plan: [place] [facility] MEDICARE / Product Type: *No Product type* /   Insurance ID: [id number] - (Medicare Managed)  Secondary Insurance (if applicable):    Treatment Diagnosis:     ICD-10-CM    1. Primary osteoarthritis of left knee  M17.12       2. Chronic pain of left knee  M25.562     G89.29          Medical Diagnosis:  Tendinitis of left quadriceps tendon [M76.892]   Referring Physician: Phi Falk MD  PCP: Aracely Spangler MD     Plan of care signed (Y/N):     Date of Patient follow up with Physician:      Plan of Care Report: NO  POC update due: (10 visits /OR AUTH LIMITS, whichever is less)   2025                                             Medical History:  Comorbidities:  Cancer/Tumor  Hypertension  Anxiety  Other: hx of a-fib and ablation  Relevant Medical History: atrial fib                                         Precautions/ Contra-indications:           Latex allergy:  NO  Pacemaker:    NO  Contraindications for Manipulation: None  Date of Surgery: NA  Other:    Red Flags:  None    Suicide Screening:   The patient did not verbalize a primary behavioral concern, suicidal ideation, suicidal intent, or demonstrate suicidal behaviors.    Preferred Language for Healthcare:   [x] English       [] other:    SUBJECTIVE EXAMINATION     Patient stated complaint:   He states he has been doing his exercises 2x/day and walking his driveway in the morning.   He may have over done it on Wednesday, his knee was really

## 2025-08-01 ENCOUNTER — HOSPITAL ENCOUNTER (OUTPATIENT)
Dept: PHYSICAL THERAPY | Age: 80
Setting detail: THERAPIES SERIES
Discharge: HOME | End: 2025-08-01
Attending: ORTHOPAEDIC SURGERY
Payer: MEDICARE

## 2025-08-01 DIAGNOSIS — G89.29 CHRONIC PAIN OF LEFT KNEE: ICD-10-CM

## 2025-08-01 DIAGNOSIS — M17.12 PRIMARY OSTEOARTHRITIS OF LEFT KNEE: Primary | ICD-10-CM

## 2025-08-01 DIAGNOSIS — M76.892 TENDINITIS OF LEFT QUADRICEPS TENDON: ICD-10-CM

## 2025-08-01 DIAGNOSIS — M25.562 CHRONIC PAIN OF LEFT KNEE: ICD-10-CM

## 2025-08-01 PROCEDURE — 97530 THERAPEUTIC ACTIVITIES: CPT | Performed by: PHYSICAL THERAPIST

## 2025-08-01 PROCEDURE — 97110 THERAPEUTIC EXERCISES: CPT | Performed by: PHYSICAL THERAPIST

## 2025-08-01 NOTE — FLOWSHEET NOTE
[facility] - Outpatient Rehabilitation and Therapy: [address], [place] [address] office: [phone] fax: [phone]     Physical Therapy Initial Evaluation Certification      Physical Therapy: TREATMENT/PROGRESS NOTE   Patient: James Ta (80 y.o. male)   Examination Date: 2025   :  1945 MRN: [id number]   Visit #: 4 of 9  Insurance Allowable Auth Needed   BMN  Requires Auth through [employer]  Exp 2025 [x]Yes    []No    Insurance: Payor: [place] [facility] MEDICARE / Plan: [place] [facility] MEDICARE / Product Type: *No Product type* /   Insurance ID: [id number] - (Medicare Managed)  Secondary Insurance (if applicable):    Treatment Diagnosis:     ICD-10-CM    1. Primary osteoarthritis of left knee  M17.12       2. Chronic pain of left knee  M25.562     G89.29       3. Tendinitis of left quadriceps tendon  M76.892          Medical Diagnosis:  Tendinitis of left quadriceps tendon [M76.892]   Referring Physician: Phi Falk MD  PCP: Aracely Spangler MD     Plan of care signed (Y/N):     Date of Patient follow up with Physician:      Plan of Care Report: NO  POC update due: (10 visits /OR AUTH LIMITS, whichever is less)   2025                                             Medical History:  Comorbidities:  Cancer/Tumor  Hypertension  Anxiety  Other: hx of a-fib and ablation  Relevant Medical History: atrial fib                                         Precautions/ Contra-indications:           Latex allergy:  NO  Pacemaker:    NO  Contraindications for Manipulation: None  Date of Surgery: NA  Other:    Red Flags:  None    Suicide Screening:   The patient did not verbalize a primary behavioral concern, suicidal ideation, suicidal intent, or demonstrate suicidal behaviors.    Preferred Language for Healthcare:   [x] English       [] other:    SUBJECTIVE EXAMINATION     Patient stated complaint:   Patient finally got his \"prism\" for his left eye so he doesn't have to wear the patch anymore.

## 2025-08-06 ENCOUNTER — HOSPITAL ENCOUNTER (OUTPATIENT)
Dept: PHYSICAL THERAPY | Age: 80
Setting detail: THERAPIES SERIES
Discharge: HOME | End: 2025-08-06
Attending: ORTHOPAEDIC SURGERY
Payer: MEDICARE

## 2025-08-06 DIAGNOSIS — M25.562 CHRONIC PAIN OF LEFT KNEE: ICD-10-CM

## 2025-08-06 DIAGNOSIS — M17.12 PRIMARY OSTEOARTHRITIS OF LEFT KNEE: Primary | ICD-10-CM

## 2025-08-06 DIAGNOSIS — G89.29 CHRONIC PAIN OF LEFT KNEE: ICD-10-CM

## 2025-08-06 DIAGNOSIS — M76.892 TENDINITIS OF LEFT QUADRICEPS TENDON: ICD-10-CM

## 2025-08-06 PROCEDURE — 97110 THERAPEUTIC EXERCISES: CPT | Performed by: PHYSICAL THERAPIST

## 2025-08-06 PROCEDURE — 97530 THERAPEUTIC ACTIVITIES: CPT | Performed by: PHYSICAL THERAPIST

## 2025-08-15 ENCOUNTER — HOSPITAL ENCOUNTER (OUTPATIENT)
Dept: PHYSICAL THERAPY | Age: 80
Setting detail: THERAPIES SERIES
Discharge: HOME | End: 2025-08-15
Attending: ORTHOPAEDIC SURGERY
Payer: MEDICARE

## 2025-08-15 DIAGNOSIS — M76.892 TENDINITIS OF LEFT QUADRICEPS TENDON: ICD-10-CM

## 2025-08-15 DIAGNOSIS — M17.12 PRIMARY OSTEOARTHRITIS OF LEFT KNEE: Primary | ICD-10-CM

## 2025-08-15 DIAGNOSIS — G89.29 CHRONIC PAIN OF LEFT KNEE: ICD-10-CM

## 2025-08-15 DIAGNOSIS — M25.562 CHRONIC PAIN OF LEFT KNEE: ICD-10-CM

## 2025-08-15 PROCEDURE — 97530 THERAPEUTIC ACTIVITIES: CPT | Performed by: PHYSICAL THERAPIST

## 2025-08-15 PROCEDURE — 97110 THERAPEUTIC EXERCISES: CPT | Performed by: PHYSICAL THERAPIST

## 2025-08-17 SDOH — HEALTH STABILITY: PHYSICAL HEALTH
ON AVERAGE, HOW MANY DAYS PER WEEK DO YOU ENGAGE IN MODERATE TO STRENUOUS EXERCISE (LIKE A BRISK WALK)?: PATIENT DECLINED

## 2025-08-18 ENCOUNTER — OFFICE VISIT (OUTPATIENT)
Dept: FAMILY MEDICINE CLINIC | Age: 80
End: 2025-08-18
Payer: MEDICARE

## 2025-08-18 VITALS
TEMPERATURE: 97.6 F | HEART RATE: 68 BPM | HEIGHT: 72 IN | SYSTOLIC BLOOD PRESSURE: 116 MMHG | BODY MASS INDEX: 30.5 KG/M2 | WEIGHT: 225.2 LBS | OXYGEN SATURATION: 97 % | DIASTOLIC BLOOD PRESSURE: 68 MMHG

## 2025-08-18 DIAGNOSIS — E78.00 PURE HYPERCHOLESTEROLEMIA: ICD-10-CM

## 2025-08-18 DIAGNOSIS — I48.0 PAF (PAROXYSMAL ATRIAL FIBRILLATION) (HCC): Primary | ICD-10-CM

## 2025-08-18 DIAGNOSIS — N52.9 ERECTILE DYSFUNCTION, UNSPECIFIED ERECTILE DYSFUNCTION TYPE: ICD-10-CM

## 2025-08-18 DIAGNOSIS — I10 ESSENTIAL HYPERTENSION: ICD-10-CM

## 2025-08-18 DIAGNOSIS — G47.33 OSA (OBSTRUCTIVE SLEEP APNEA): ICD-10-CM

## 2025-08-18 DIAGNOSIS — M79.10 MYALGIA: ICD-10-CM

## 2025-08-18 DIAGNOSIS — I71.40 ABDOMINAL AORTIC ANEURYSM (AAA) WITHOUT RUPTURE, UNSPECIFIED PART: ICD-10-CM

## 2025-08-18 DIAGNOSIS — Z12.5 SCREENING FOR PROSTATE CANCER: ICD-10-CM

## 2025-08-18 PROCEDURE — 1123F ACP DISCUSS/DSCN MKR DOCD: CPT | Performed by: INTERNAL MEDICINE

## 2025-08-18 PROCEDURE — 3078F DIAST BP <80 MM HG: CPT | Performed by: INTERNAL MEDICINE

## 2025-08-18 PROCEDURE — 1159F MED LIST DOCD IN RCRD: CPT | Performed by: INTERNAL MEDICINE

## 2025-08-18 PROCEDURE — 3074F SYST BP LT 130 MM HG: CPT | Performed by: INTERNAL MEDICINE

## 2025-08-18 PROCEDURE — 99214 OFFICE O/P EST MOD 30 MIN: CPT | Performed by: INTERNAL MEDICINE

## 2025-08-18 RX ORDER — SILDENAFIL 50 MG/1
50 TABLET, FILM COATED ORAL PRN
Qty: 30 TABLET | Refills: 3 | Status: SHIPPED | OUTPATIENT
Start: 2025-08-18

## 2025-08-22 DIAGNOSIS — I10 ESSENTIAL HYPERTENSION: ICD-10-CM

## 2025-08-22 DIAGNOSIS — Z12.5 SCREENING FOR PROSTATE CANCER: ICD-10-CM

## 2025-08-22 DIAGNOSIS — E78.00 PURE HYPERCHOLESTEROLEMIA: ICD-10-CM

## 2025-08-22 LAB
ALBUMIN SERPL-MCNC: 4.2 G/DL (ref 3.4–5)
ALBUMIN/GLOB SERPL: 1.5 {RATIO} (ref 1.1–2.2)
ALP SERPL-CCNC: 96 U/L (ref 40–129)
ALT SERPL-CCNC: 20 U/L (ref 10–40)
ANION GAP SERPL CALCULATED.3IONS-SCNC: 10 MMOL/L (ref 3–16)
AST SERPL-CCNC: 18 U/L (ref 15–37)
BASOPHILS # BLD: 0 K/UL (ref 0–0.2)
BASOPHILS NFR BLD: 0.5 %
BILIRUB SERPL-MCNC: 1.1 MG/DL (ref 0–1)
BUN SERPL-MCNC: 11 MG/DL (ref 7–20)
CALCIUM SERPL-MCNC: 9.9 MG/DL (ref 8.3–10.6)
CHLORIDE SERPL-SCNC: 106 MMOL/L (ref 99–110)
CHOLEST SERPL-MCNC: 138 MG/DL (ref 0–199)
CO2 SERPL-SCNC: 25 MMOL/L (ref 21–32)
CREAT SERPL-MCNC: 0.7 MG/DL (ref 0.8–1.3)
DEPRECATED RDW RBC AUTO: 13.7 % (ref 12.4–15.4)
EOSINOPHIL # BLD: 0.1 K/UL (ref 0–0.6)
EOSINOPHIL NFR BLD: 1.7 %
GFR SERPLBLD CREATININE-BSD FMLA CKD-EPI: >90 ML/MIN/{1.73_M2}
GLUCOSE SERPL-MCNC: 97 MG/DL (ref 70–99)
HCT VFR BLD AUTO: 40.5 % (ref 40.5–52.5)
HDLC SERPL-MCNC: 42 MG/DL (ref 40–60)
HGB BLD-MCNC: 14 G/DL (ref 13.5–17.5)
LDLC SERPL CALC-MCNC: 74 MG/DL
LYMPHOCYTES # BLD: 1.6 K/UL (ref 1–5.1)
LYMPHOCYTES NFR BLD: 24.7 %
MCH RBC QN AUTO: 31.9 PG (ref 26–34)
MCHC RBC AUTO-ENTMCNC: 34.5 G/DL (ref 31–36)
MCV RBC AUTO: 92.5 FL (ref 80–100)
MONOCYTES # BLD: 0.5 K/UL (ref 0–1.3)
MONOCYTES NFR BLD: 6.9 %
NEUTROPHILS # BLD: 4.4 K/UL (ref 1.7–7.7)
NEUTROPHILS NFR BLD: 66.2 %
PLATELET # BLD AUTO: 211 K/UL (ref 135–450)
PMV BLD AUTO: 7.8 FL (ref 5–10.5)
POTASSIUM SERPL-SCNC: 4.7 MMOL/L (ref 3.5–5.1)
PROT SERPL-MCNC: 7 G/DL (ref 6.4–8.2)
PSA SERPL DL<=0.01 NG/ML-MCNC: 0.72 NG/ML (ref 0–4)
RBC # BLD AUTO: 4.38 M/UL (ref 4.2–5.9)
SODIUM SERPL-SCNC: 141 MMOL/L (ref 136–145)
TRIGL SERPL-MCNC: 110 MG/DL (ref 0–150)
VLDLC SERPL CALC-MCNC: 22 MG/DL
WBC # BLD AUTO: 6.6 K/UL (ref 4–11)

## 2025-08-29 ENCOUNTER — HOSPITAL ENCOUNTER (OUTPATIENT)
Dept: PHYSICAL THERAPY | Age: 80
Setting detail: THERAPIES SERIES
Discharge: HOME | End: 2025-08-29
Attending: ORTHOPAEDIC SURGERY
Payer: MEDICARE

## 2025-08-29 DIAGNOSIS — G89.29 CHRONIC PAIN OF LEFT KNEE: ICD-10-CM

## 2025-08-29 DIAGNOSIS — M25.562 CHRONIC PAIN OF LEFT KNEE: ICD-10-CM

## 2025-08-29 DIAGNOSIS — M17.12 PRIMARY OSTEOARTHRITIS OF LEFT KNEE: Primary | ICD-10-CM

## 2025-08-29 DIAGNOSIS — M76.892 TENDINITIS OF LEFT QUADRICEPS TENDON: ICD-10-CM

## 2025-08-29 PROCEDURE — 97110 THERAPEUTIC EXERCISES: CPT | Performed by: PHYSICAL THERAPIST

## 2025-08-29 PROCEDURE — 97530 THERAPEUTIC ACTIVITIES: CPT | Performed by: PHYSICAL THERAPIST

## 2025-09-03 ENCOUNTER — HOSPITAL ENCOUNTER (OUTPATIENT)
Dept: PHYSICAL THERAPY | Age: 80
Setting detail: THERAPIES SERIES
Discharge: HOME | End: 2025-09-03
Attending: ORTHOPAEDIC SURGERY
Payer: MEDICARE

## 2025-09-03 DIAGNOSIS — M25.562 CHRONIC PAIN OF LEFT KNEE: ICD-10-CM

## 2025-09-03 DIAGNOSIS — M76.892 TENDINITIS OF LEFT QUADRICEPS TENDON: ICD-10-CM

## 2025-09-03 DIAGNOSIS — M17.12 PRIMARY OSTEOARTHRITIS OF LEFT KNEE: Primary | ICD-10-CM

## 2025-09-03 DIAGNOSIS — G89.29 CHRONIC PAIN OF LEFT KNEE: ICD-10-CM

## 2025-09-03 PROCEDURE — 97110 THERAPEUTIC EXERCISES: CPT | Performed by: PHYSICAL THERAPIST

## 2025-09-03 PROCEDURE — 97530 THERAPEUTIC ACTIVITIES: CPT | Performed by: PHYSICAL THERAPIST

## (undated) DEVICE — CONTAINER SPEC 480ML CLR POLYSTYR 10% NEUT BUFF FRMLN ZN

## (undated) DEVICE — TRAP POLYP ETRAP

## (undated) DEVICE — ENDOSCOPY KIT: Brand: MEDLINE INDUSTRIES, INC.

## (undated) DEVICE — SNARES COLD OVAL 10MM THIN

## (undated) DEVICE — BITE BLOCK ENDOSCP AD 60 FR W/ ADJ STRP PLAS GRN BLOX

## (undated) DEVICE — FORCEPS BX 240CM 2.4MM L NDL RAD JAW 4 M00513334